# Patient Record
Sex: MALE | Race: WHITE | Employment: OTHER | URBAN - METROPOLITAN AREA
[De-identification: names, ages, dates, MRNs, and addresses within clinical notes are randomized per-mention and may not be internally consistent; named-entity substitution may affect disease eponyms.]

---

## 2017-01-12 ENCOUNTER — OFFICE VISIT (OUTPATIENT)
Dept: FAMILY MEDICINE CLINIC | Facility: CLINIC | Age: 62
End: 2017-01-12

## 2017-01-12 VITALS
TEMPERATURE: 99 F | SYSTOLIC BLOOD PRESSURE: 128 MMHG | HEART RATE: 66 BPM | WEIGHT: 192 LBS | BODY MASS INDEX: 29.78 KG/M2 | HEIGHT: 67.5 IN | DIASTOLIC BLOOD PRESSURE: 70 MMHG

## 2017-01-12 DIAGNOSIS — L21.9 SEBORRHEIC DERMATITIS: ICD-10-CM

## 2017-01-12 DIAGNOSIS — M79.671 RIGHT FOOT PAIN: Primary | ICD-10-CM

## 2017-01-12 PROCEDURE — 99214 OFFICE O/P EST MOD 30 MIN: CPT | Performed by: FAMILY MEDICINE

## 2017-01-12 RX ORDER — NAPROXEN 500 MG/1
500 TABLET ORAL 2 TIMES DAILY WITH MEALS
Qty: 14 TABLET | Refills: 1 | Status: SHIPPED | OUTPATIENT
Start: 2017-01-12 | End: 2017-01-19

## 2017-01-12 RX ORDER — KETOCONAZOLE 20 MG/G
1 CREAM TOPICAL DAILY
Qty: 30 G | Refills: 0 | Status: SHIPPED | OUTPATIENT
Start: 2017-01-12 | End: 2017-08-02 | Stop reason: ALTCHOICE

## 2017-01-12 NOTE — PROGRESS NOTES
Joe Ames is a 64year old male. Patient presents with: Ball Of Foot Pain: per pt      HPI:   Pain in the ball of his right foot. Started two days ago, no injury that he can think of. Shoes are 10 months old.  Has not had issues in the past. Took i 4 Cans of beer, 0 Standard drinks or equivalent per week       Comment: 4 drinks per week       BP Readings from Last 6 Encounters:  01/12/17 : 128/70  12/22/16 : 116/63  10/03/16 : 121/69  09/17/16 : 112/74  08/30/16 : 104/70  08/01/16 : 116/62      Wt R Possible neuroma, bone spur or other lesion. No trauma so fracture is less likely. - stay off of it if possible. Pt understands and agrees with plan. Seborrheic dermatitis  -     ketoconazole 2 % External Cream; Apply 1 Application topically daily.

## 2017-03-13 ENCOUNTER — PATIENT MESSAGE (OUTPATIENT)
Dept: FAMILY MEDICINE CLINIC | Facility: CLINIC | Age: 62
End: 2017-03-13

## 2017-03-13 DIAGNOSIS — H91.8X9 OTHER SPECIFIED FORMS OF HEARING LOSS, UNSPECIFIED LATERALITY: Primary | ICD-10-CM

## 2017-03-13 DIAGNOSIS — L29.9 EAR ITCHING: ICD-10-CM

## 2017-03-13 NOTE — TELEPHONE ENCOUNTER
From: Oscar Perez  To: Eduarda Stallworth DO  Sent: 3/13/2017 9:45 AM CDT  Subject: Other    My right ear, (the only ear I can hear from) has been suffering from skin issues and the medicine you prescribed does not seem to be helping.  The issue has got

## 2017-03-13 NOTE — TELEPHONE ENCOUNTER
Per Epic patient has a PPO plan and would not need a referral.  Do you want to send and order or any records to Dr Enzo Lofton office?

## 2017-05-15 NOTE — TELEPHONE ENCOUNTER
Medication: Sertraline    Date of last refill: 10/10/16 with 5 addt refills  [place]     Last office visit: 12/22/2016  Due back to clinic per last office note:  RTN in 6 months 06/22/17  Date next office visit scheduled

## 2017-07-12 ENCOUNTER — PRIOR ORIGINAL RECORDS (OUTPATIENT)
Dept: OTHER | Age: 62
End: 2017-07-12

## 2017-07-13 ENCOUNTER — LAB ENCOUNTER (OUTPATIENT)
Dept: LAB | Age: 62
End: 2017-07-13
Attending: INTERNAL MEDICINE
Payer: COMMERCIAL

## 2017-07-13 DIAGNOSIS — E78.00 PURE HYPERCHOLESTEROLEMIA: ICD-10-CM

## 2017-07-13 DIAGNOSIS — I10 ESSENTIAL HYPERTENSION, MALIGNANT: ICD-10-CM

## 2017-07-13 DIAGNOSIS — R07.9 CHEST PAIN, UNSPECIFIED: Primary | ICD-10-CM

## 2017-07-13 LAB
ALBUMIN SERPL-MCNC: 4.1 G/DL (ref 3.5–4.8)
ALP LIVER SERPL-CCNC: 84 U/L (ref 45–117)
ALT SERPL-CCNC: 50 U/L (ref 17–63)
AST SERPL-CCNC: 22 U/L (ref 15–41)
BILIRUB SERPL-MCNC: 1.1 MG/DL (ref 0.1–2)
BUN BLD-MCNC: 19 MG/DL (ref 8–20)
CALCIUM BLD-MCNC: 9.2 MG/DL (ref 8.3–10.3)
CHLORIDE: 107 MMOL/L (ref 101–111)
CHOLEST SMN-MCNC: 112 MG/DL (ref ?–200)
CO2: 27 MMOL/L (ref 22–32)
CREAT BLD-MCNC: 1.21 MG/DL (ref 0.7–1.3)
GLUCOSE BLD-MCNC: 100 MG/DL (ref 70–99)
M PROTEIN MFR SERPL ELPH: 7.2 G/DL (ref 6.1–8.3)
POTASSIUM SERPL-SCNC: 4.4 MMOL/L (ref 3.6–5.1)
SODIUM SERPL-SCNC: 141 MMOL/L (ref 136–144)

## 2017-07-13 PROCEDURE — 82465 ASSAY BLD/SERUM CHOLESTEROL: CPT

## 2017-07-13 PROCEDURE — 80053 COMPREHEN METABOLIC PANEL: CPT

## 2017-07-13 PROCEDURE — 36415 COLL VENOUS BLD VENIPUNCTURE: CPT

## 2017-07-19 ENCOUNTER — HOSPITAL ENCOUNTER (OUTPATIENT)
Dept: CV DIAGNOSTICS | Age: 62
Discharge: HOME | End: 2017-07-19
Attending: INTERNAL MEDICINE
Payer: COMMERCIAL

## 2017-07-19 DIAGNOSIS — I25.10 CORONARY ATHEROSCLEROSIS OF NATIVE CORONARY ARTERY: ICD-10-CM

## 2017-07-19 DIAGNOSIS — R07.9 CHEST PAIN, UNSPECIFIED: ICD-10-CM

## 2017-07-19 PROCEDURE — 93018 CV STRESS TEST I&R ONLY: CPT | Performed by: INTERNAL MEDICINE

## 2017-07-19 PROCEDURE — 78452 HT MUSCLE IMAGE SPECT MULT: CPT | Performed by: INTERNAL MEDICINE

## 2017-07-19 PROCEDURE — 93017 CV STRESS TEST TRACING ONLY: CPT | Performed by: INTERNAL MEDICINE

## 2017-07-20 ENCOUNTER — PRIOR ORIGINAL RECORDS (OUTPATIENT)
Dept: OTHER | Age: 62
End: 2017-07-20

## 2017-07-31 RX ORDER — ATORVASTATIN CALCIUM 20 MG/1
TABLET, FILM COATED ORAL
Qty: 90 TABLET | Refills: 0 | Status: SHIPPED | OUTPATIENT
Start: 2017-07-31 | End: 2017-10-30

## 2017-07-31 NOTE — TELEPHONE ENCOUNTER
Last ov 1/12/17  Last labs 7/13/17  Last refilled 12/23/16  #90  1 refill     Routed to Dr Bruno Julian as covering provider

## 2017-08-02 ENCOUNTER — OFFICE VISIT (OUTPATIENT)
Dept: FAMILY MEDICINE CLINIC | Facility: CLINIC | Age: 62
End: 2017-08-02

## 2017-08-02 VITALS
BODY MASS INDEX: 30 KG/M2 | WEIGHT: 195.13 LBS | DIASTOLIC BLOOD PRESSURE: 62 MMHG | TEMPERATURE: 98 F | SYSTOLIC BLOOD PRESSURE: 108 MMHG

## 2017-08-02 DIAGNOSIS — R21 RASH: Primary | ICD-10-CM

## 2017-08-02 PROCEDURE — 99214 OFFICE O/P EST MOD 30 MIN: CPT | Performed by: FAMILY MEDICINE

## 2017-08-02 RX ORDER — PREDNISONE 20 MG/1
TABLET ORAL
Qty: 18 TABLET | Refills: 0 | Status: SHIPPED | OUTPATIENT
Start: 2017-08-02 | End: 2017-08-11

## 2017-08-02 RX ORDER — VALACYCLOVIR HYDROCHLORIDE 1 G/1
1 TABLET, FILM COATED ORAL EVERY 12 HOURS SCHEDULED
Qty: 21 TABLET | Refills: 0 | Status: SHIPPED | OUTPATIENT
Start: 2017-08-02 | End: 2017-08-09

## 2017-08-02 NOTE — PROGRESS NOTES
Federico Brown is a 58year old male. CC:  Patient presents with:  Derm Problem: rash on side of head, jaw swelling, and heartburn. ... Sumanth Angles Sumanth Angles RM 4      HPI:  L side of head with a rash for about 4-5 days. No recall of exposure.  He notes a headache on the Denies congestion    Vitals: /62   Temp 98.3 °F (36.8 °C) (Temporal)   Wt 195 lb 2 oz   BMI 30.11 kg/m²    Reviewed by Blake Mendez M.D.     Physical Exam:  GEN: well developed, well nourished, in no apparent distress  EYE: B conjunctiva and lids mehdi

## 2017-08-09 ENCOUNTER — PRIOR ORIGINAL RECORDS (OUTPATIENT)
Dept: OTHER | Age: 62
End: 2017-08-09

## 2017-09-25 DIAGNOSIS — R41.3 MEMORY PROBLEM: Primary | ICD-10-CM

## 2017-09-25 RX ORDER — SERTRALINE HYDROCHLORIDE 25 MG/1
25 TABLET, FILM COATED ORAL DAILY
Qty: 30 TABLET | Refills: 2 | Status: SHIPPED | OUTPATIENT
Start: 2017-09-25 | End: 2017-09-26

## 2017-09-25 NOTE — TELEPHONE ENCOUNTER
Medication: Sertraline 50 mg    Date of last refill: 05/15/17  [place]     Last office visit: 12/22/16  Due back to clinic per last office note: RTN in 6 months  By 06/22/17  Date next office visit scheduled:  No future

## 2017-09-26 ENCOUNTER — OFFICE VISIT (OUTPATIENT)
Dept: NEUROLOGY | Facility: CLINIC | Age: 62
End: 2017-09-26

## 2017-09-26 VITALS
SYSTOLIC BLOOD PRESSURE: 117 MMHG | BODY MASS INDEX: 29.47 KG/M2 | RESPIRATION RATE: 16 BRPM | HEART RATE: 65 BPM | WEIGHT: 190 LBS | HEIGHT: 67.5 IN | DIASTOLIC BLOOD PRESSURE: 72 MMHG

## 2017-09-26 DIAGNOSIS — R41.3 MEMORY PROBLEM: Primary | ICD-10-CM

## 2017-09-26 PROCEDURE — 99213 OFFICE O/P EST LOW 20 MIN: CPT | Performed by: PHYSICIAN ASSISTANT

## 2017-09-26 RX ORDER — SERTRALINE HYDROCHLORIDE 25 MG/1
25 TABLET, FILM COATED ORAL DAILY
Qty: 90 TABLET | Refills: 3 | Status: SHIPPED | OUTPATIENT
Start: 2017-09-26 | End: 2018-10-26

## 2017-09-26 NOTE — PATIENT INSTRUCTIONS
Refill policies:    • Allow 2-3 business days for refills; controlled substances may take longer.   • Contact your pharmacy at least 5 days prior to running out of medication and have them send an electronic request or submit request through the [facility] have a procedure or additional testing performed. [facility]) will contact your insurance carrier to obtain pre-certification or prior authorization.     Unfortunately, [facility] has seen an increase in denial of payment even though the p

## 2017-09-28 NOTE — PROGRESS NOTES
HPI:    Patient ID: Lionel Pina is a 58year old male. HPI     Patient is a 58year old male here for follow-up of memory complaints.  At last visit 6 months ago his zoloft was increased to 75mg daily and he is tolerating it well without side effec displays no tremor and normal reflexes. No cranial nerve deficit. He displays a negative Romberg sign. Coordination and gait normal.   Reflex Scores:       Tricep reflexes are 2+ on the right side and 2+ on the left side.        Bicep reflexes are 2+ on the

## 2017-10-26 ENCOUNTER — TELEPHONE (OUTPATIENT)
Dept: NEUROLOGY | Facility: CLINIC | Age: 62
End: 2017-10-26

## 2017-10-30 RX ORDER — ATORVASTATIN CALCIUM 20 MG/1
TABLET, FILM COATED ORAL
Qty: 30 TABLET | Refills: 11 | Status: SHIPPED | OUTPATIENT
Start: 2017-10-30 | End: 2018-11-02

## 2017-11-10 ENCOUNTER — OFFICE VISIT (OUTPATIENT)
Dept: FAMILY MEDICINE CLINIC | Facility: CLINIC | Age: 62
End: 2017-11-10

## 2017-11-10 VITALS
RESPIRATION RATE: 16 BRPM | SYSTOLIC BLOOD PRESSURE: 118 MMHG | HEART RATE: 64 BPM | BODY MASS INDEX: 29.7 KG/M2 | TEMPERATURE: 98 F | WEIGHT: 196 LBS | HEIGHT: 68 IN | DIASTOLIC BLOOD PRESSURE: 78 MMHG

## 2017-11-10 DIAGNOSIS — R53.82 CHRONIC FATIGUE: ICD-10-CM

## 2017-11-10 DIAGNOSIS — R12 HEART BURN: ICD-10-CM

## 2017-11-10 DIAGNOSIS — R19.7 DIARRHEA, UNSPECIFIED TYPE: Primary | ICD-10-CM

## 2017-11-10 PROCEDURE — 82306 VITAMIN D 25 HYDROXY: CPT | Performed by: FAMILY MEDICINE

## 2017-11-10 PROCEDURE — 82728 ASSAY OF FERRITIN: CPT | Performed by: FAMILY MEDICINE

## 2017-11-10 PROCEDURE — 83690 ASSAY OF LIPASE: CPT | Performed by: FAMILY MEDICINE

## 2017-11-10 PROCEDURE — 80061 LIPID PANEL: CPT | Performed by: FAMILY MEDICINE

## 2017-11-10 PROCEDURE — 36415 COLL VENOUS BLD VENIPUNCTURE: CPT | Performed by: FAMILY MEDICINE

## 2017-11-10 PROCEDURE — 99214 OFFICE O/P EST MOD 30 MIN: CPT | Performed by: FAMILY MEDICINE

## 2017-11-10 PROCEDURE — 80050 GENERAL HEALTH PANEL: CPT | Performed by: FAMILY MEDICINE

## 2017-11-10 NOTE — PROGRESS NOTES
Javier Dayanna is a 58year old male. Patient presents with: Follow - Up: general checkup, having diarrhea, heartburn per pt      HPI:   Feeling tired, no drive. Lack of motivation, stays home more than he used to. Diarrhea for 1 month.  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 Diagnoses and all orders for this visit:    Diarrhea, unspecified type  -     CBC WITH DIFFERENTIAL WITH PLATELET; Future  -     COMP METABOLIC PANEL (14);  Future  -     LIPID PANEL;

## 2017-11-11 RX ORDER — INFLUENZA A VIRUS A/CHRISTCHURCH/16/2010 NIB-74 (H1N1) HEMAGGLUTININ ANTIGEN (PROPIOLACTONE INACTIVATED), INFLUENZA A VIRUS A/SWITZERLAND/9715293/2013, NIB-88 (H3N2) HEMAGGLUTININ ANTIGEN (PROPIOLACTONE INACTIVATED), INFLUENZA B VIRUS B/PHUKET/3073/2013 - WILD TYPE HEMAGGLUTININ ANTIGEN (PROPIOLACTONE INACTIVATED) 15; 15; 15 UG/.5ML; UG/.5ML; UG/.5ML
INJECTION, SUSPENSION INTRAMUSCULAR
Refills: 0 | COMMUNITY
Start: 2017-09-28 | End: 2018-10-30 | Stop reason: ALTCHOICE

## 2017-11-16 ENCOUNTER — HOSPITAL ENCOUNTER (OUTPATIENT)
Dept: ULTRASOUND IMAGING | Age: 62
Discharge: HOME | End: 2017-11-16
Attending: FAMILY MEDICINE
Payer: COMMERCIAL

## 2017-11-16 DIAGNOSIS — R53.82 CHRONIC FATIGUE: ICD-10-CM

## 2017-11-16 DIAGNOSIS — R12 HEART BURN: ICD-10-CM

## 2017-11-16 DIAGNOSIS — R19.7 DIARRHEA, UNSPECIFIED TYPE: ICD-10-CM

## 2017-11-16 PROCEDURE — 76700 US EXAM ABDOM COMPLETE: CPT | Performed by: FAMILY MEDICINE

## 2018-04-06 ENCOUNTER — NURSE ONLY (OUTPATIENT)
Dept: FAMILY MEDICINE CLINIC | Facility: CLINIC | Age: 63
End: 2018-04-06

## 2018-04-06 ENCOUNTER — PRIOR ORIGINAL RECORDS (OUTPATIENT)
Dept: OTHER | Age: 63
End: 2018-04-06

## 2018-04-06 DIAGNOSIS — E78.00 PURE HYPERCHOLESTEROLEMIA: Primary | ICD-10-CM

## 2018-04-06 PROCEDURE — 36415 COLL VENOUS BLD VENIPUNCTURE: CPT | Performed by: FAMILY MEDICINE

## 2018-04-06 PROCEDURE — 80061 LIPID PANEL: CPT | Performed by: INTERNAL MEDICINE

## 2018-04-06 PROCEDURE — 80053 COMPREHEN METABOLIC PANEL: CPT | Performed by: INTERNAL MEDICINE

## 2018-04-06 NOTE — PROGRESS NOTES
Isidra Youngblood presents today for nurse visit. Order from Dr Alfredito Velasco in blue book. 1 light green drawn from left hand area with straight needle. Left office in stable condition.

## 2018-04-11 ENCOUNTER — PRIOR ORIGINAL RECORDS (OUTPATIENT)
Dept: OTHER | Age: 63
End: 2018-04-11

## 2018-04-11 LAB
ALBUMIN: 3.7 G/DL
ALKALINE PHOSPHATATE(ALK PHOS): 80 IU/L
BILIRUBIN TOTAL: 0.7 MG/DL
BUN: 20 MG/DL
CALCIUM: 8.5 MG/DL
CHLORIDE: 109 MEQ/L
CHOLESTEROL, TOTAL: 97 MG/DL
CREATININE, SERUM: 1.07 MG/DL
GLUCOSE: 100 MG/DL
HDL CHOLESTEROL: 37 MG/DL
LDL CHOLESTEROL: 45 MG/DL
NON-HDL CHOLESTEROL: 60 MG/DL
POTASSIUM, SERUM: 3.9 MEQ/L
PROTEIN, TOTAL: 6.7 G/DL
SGOT (AST): 21 IU/L
SGPT (ALT): 46 IU/L
SODIUM: 142 MEQ/L
TOTAL CHOLESTEROL / HDL RATIO: 2.62 RATIO UN
TRIGLYCERIDES: 74 MG/DL

## 2018-05-09 ENCOUNTER — PRIOR ORIGINAL RECORDS (OUTPATIENT)
Dept: OTHER | Age: 63
End: 2018-05-09

## 2018-05-09 ENCOUNTER — MYAURORA ACCOUNT LINK (OUTPATIENT)
Dept: OTHER | Age: 63
End: 2018-05-09

## 2018-05-11 ENCOUNTER — MED REC SCAN ONLY (OUTPATIENT)
Dept: FAMILY MEDICINE CLINIC | Facility: CLINIC | Age: 63
End: 2018-05-11

## 2018-05-22 ENCOUNTER — TELEPHONE (OUTPATIENT)
Dept: FAMILY MEDICINE CLINIC | Facility: CLINIC | Age: 63
End: 2018-05-22

## 2018-05-23 ENCOUNTER — OFFICE VISIT (OUTPATIENT)
Dept: FAMILY MEDICINE CLINIC | Facility: CLINIC | Age: 63
End: 2018-05-23

## 2018-05-23 VITALS
SYSTOLIC BLOOD PRESSURE: 110 MMHG | HEART RATE: 60 BPM | HEIGHT: 67.5 IN | RESPIRATION RATE: 14 BRPM | BODY MASS INDEX: 30.04 KG/M2 | WEIGHT: 193.63 LBS | DIASTOLIC BLOOD PRESSURE: 80 MMHG | TEMPERATURE: 98 F

## 2018-05-23 DIAGNOSIS — L03.031 PARONYCHIA OF GREAT TOE, RIGHT: Primary | ICD-10-CM

## 2018-05-23 PROCEDURE — 99213 OFFICE O/P EST LOW 20 MIN: CPT | Performed by: FAMILY MEDICINE

## 2018-05-23 NOTE — PROGRESS NOTES
HPI:    Patient ID: Ulysses Malay is a 61year old male. Patient presents with: Toe Pain      HPI  Patient is here for Rt toe pain for 2 days. States about 3-4 days ago he got a splinter in his Rt great toe.  He attempted to remove it at home but fa COLONOSCOPY      Comment: normal, repeat in 3 yrs due to h/o polyps and                family h/o colon cancer  10/3/2016: COLONOSCOPY N/A      Comment: Procedure: COLONOSCOPY;  Surgeon:  Steffany Mendoza MD;  Location: [address] [place] [address] ENDOSCOPY  No more

## 2018-07-27 ENCOUNTER — OFFICE VISIT (OUTPATIENT)
Dept: FAMILY MEDICINE CLINIC | Facility: CLINIC | Age: 63
End: 2018-07-27
Payer: COMMERCIAL

## 2018-07-27 VITALS
HEART RATE: 62 BPM | WEIGHT: 186 LBS | OXYGEN SATURATION: 98 % | SYSTOLIC BLOOD PRESSURE: 126 MMHG | DIASTOLIC BLOOD PRESSURE: 74 MMHG | BODY MASS INDEX: 29 KG/M2

## 2018-07-27 DIAGNOSIS — R10.31 RIGHT INGUINAL PAIN: ICD-10-CM

## 2018-07-27 DIAGNOSIS — L03.011 PARONYCHIA OF FINGER OF RIGHT HAND: Primary | ICD-10-CM

## 2018-07-27 DIAGNOSIS — G56.01 CARPAL TUNNEL SYNDROME OF RIGHT WRIST: ICD-10-CM

## 2018-07-27 PROCEDURE — 99214 OFFICE O/P EST MOD 30 MIN: CPT | Performed by: FAMILY MEDICINE

## 2018-07-27 RX ORDER — CEPHALEXIN 500 MG/1
500 CAPSULE ORAL 2 TIMES DAILY
Qty: 14 CAPSULE | Refills: 0 | Status: SHIPPED | OUTPATIENT
Start: 2018-07-27 | End: 2018-08-03

## 2018-07-27 NOTE — PROGRESS NOTES
Isidra Youngblood is a 61year old male. Patient presents with:  Eval-G (gynecologic): sharp pain on right side x 1 day  Finger Injury: right index x 10 days  Numbness: right mid and ring finger x 2 weeks       HPI:   Retired in June.  Elisabethd the entire l ear      Social History:  Smoking status: Never Smoker                                                              Smokeless tobacco: Never Used                      Alcohol use: Yes           2.4 oz/week     Cans of beer: 4 per week     Comment: 4 drinks Cap; Take 1 capsule (500 mg total) by mouth 2 (two) times daily. - take with a daily probiotic or yogurt     Carpal tunnel syndrome of right wrist  - wear a brace, can take 2 aleve OTC BID x 1 week to help calm it down, take with food.      Right inguinal

## 2018-08-30 ENCOUNTER — OFFICE VISIT (OUTPATIENT)
Dept: FAMILY MEDICINE CLINIC | Facility: CLINIC | Age: 63
End: 2018-08-30
Payer: COMMERCIAL

## 2018-08-30 VITALS — OXYGEN SATURATION: 98 % | DIASTOLIC BLOOD PRESSURE: 80 MMHG | HEART RATE: 64 BPM | SYSTOLIC BLOOD PRESSURE: 124 MMHG

## 2018-08-30 DIAGNOSIS — H61.21 IMPACTED CERUMEN OF RIGHT EAR: Primary | ICD-10-CM

## 2018-08-30 DIAGNOSIS — H60.501 ACUTE OTITIS EXTERNA OF RIGHT EAR, UNSPECIFIED TYPE: ICD-10-CM

## 2018-08-30 PROCEDURE — 69209 REMOVE IMPACTED EAR WAX UNI: CPT | Performed by: PHYSICIAN ASSISTANT

## 2018-08-30 NOTE — PATIENT INSTRUCTIONS
1.  Cortisporin otic drops as prescribed for 7 days. 2.  Debrox ear drops (over the counter) as needed/directed. Earwax Removal    The ear canal makes earwax from the canal’s lining.  The ears make wax to lubricate and protect the ear canal. The e · Don’t use cotton swabs in your ears. Cotton swabs may push wax deeper into the ear canal or damage the eardrum.  Use cotton gauze or a wet washcloth  to gently remove wax on the outside of the ear and around the opening to the ear canal.  · Don't use any © 0186-6460 [address]. [address] [facility] [place], [facility] [place]. All rights reserved. This information is not intended as a substitute for professional medical care. Always follow your healthcare professional's instructions.

## 2018-08-30 NOTE — PROGRESS NOTES
CHIEF COMPLAINT:   Patient presents with:  Cerumen Impaction: R ear, x 1 day--onset after attempting to clear out ears with q tip. H/o L sided hearing loss.        HPI:   Lionel Pina is a 61year old male who presents to clinic today with complaint SKIN: no unusual skin lesions or rashes  HEENT: See HPI  LUNGS: No shortness of breath, or wheezing. CARDIOVASCULAR: No chest pain, palpitations  GI: No N/V/C/D.   NEURO: denies headaches or dizziness    EXAM:   /80 (BP Location: Left arm)   Pulse 64 Follow up with PCP if s/sx worsen, do not begin to improve in 3 days, or if fever of 100.4 or greater persists for 72 hours. Patient voiced understand and is in agreement with treatment plan. Patient Instructions   1.   Cortisporin otic drops as pre · Don’t use mineral oil or OTC eardrops if you might have an ear infection or a ruptured eardrum. Tell your healthcare provider right away if you have diabetes or an immune disorder. · Don’t use cotton swabs in your ears.  Cotton swabs may push wax deeper © 7841-2796 [address]. [address] [facility] [place], [address] [place]. All rights reserved. This information is not intended as a substitute for professional medical care. Always follow your healthcare professional's instructions.           Benny Angelucci

## 2018-10-16 ENCOUNTER — HOSPITAL ENCOUNTER (OUTPATIENT)
Age: 63
Discharge: HOME | End: 2018-10-16
Payer: COMMERCIAL

## 2018-10-16 VITALS
OXYGEN SATURATION: 98 % | SYSTOLIC BLOOD PRESSURE: 118 MMHG | WEIGHT: 182 LBS | BODY MASS INDEX: 28 KG/M2 | HEART RATE: 75 BPM | DIASTOLIC BLOOD PRESSURE: 82 MMHG | TEMPERATURE: 97 F | RESPIRATION RATE: 16 BRPM

## 2018-10-16 DIAGNOSIS — S61.012A LACERATION OF LEFT THUMB WITHOUT FOREIGN BODY WITHOUT DAMAGE TO NAIL, INITIAL ENCOUNTER: Primary | ICD-10-CM

## 2018-10-16 PROCEDURE — 12001 RPR S/N/AX/GEN/TRNK 2.5CM/<: CPT

## 2018-10-16 PROCEDURE — 99213 OFFICE O/P EST LOW 20 MIN: CPT

## 2018-10-16 PROCEDURE — 99212 OFFICE O/P EST SF 10 MIN: CPT

## 2018-10-16 NOTE — ED INITIAL ASSESSMENT (HPI)
Patient states he was carving wood with a knife and lacerated his left thumb at approximately 1650 today. States his tetanus booster was updated this past summer.

## 2018-10-16 NOTE — ED PROVIDER NOTES
Patient Seen in: [id number] [facility] [place]    History   Patient presents with:  Laceration    Stated Complaint: L. Thumb Laceration    HPI  Pleasant 17-year-old male who states he was carving wood with a knife and lacerated his left thumb this after Constitutional: He is oriented to person, place, and time. He appears well-developed and well-nourished. No distress. HENT:   Head: Normocephalic and atraumatic. Neck: Normal range of motion.    Cardiovascular: Normal rate, regular rhythm, normal heart The patient is in good condition throughout his visit today and remains so upon discharge.  I discuss the plan of care with the patient, who expresses understanding.  All questions and concerns are addressed to the patient's satisfaction prior to discharge

## 2018-10-26 DIAGNOSIS — R41.3 MEMORY PROBLEM: ICD-10-CM

## 2018-10-26 RX ORDER — SERTRALINE HYDROCHLORIDE 25 MG/1
25 TABLET, FILM COATED ORAL DAILY
Qty: 90 TABLET | Refills: 1 | Status: SHIPPED | OUTPATIENT
Start: 2018-10-26 | End: 2019-04-18

## 2018-10-26 NOTE — TELEPHONE ENCOUNTER
Medication: Sertraline 50 mg & 25 mg    Date of last refill: 09/26/17 with 3 addt refills  [place]     Last office visit: 09/26/17  Due back to clinic per last office note:  RTN in 1 year by 09/26/18  Date next office vi

## 2018-10-30 ENCOUNTER — OFFICE VISIT (OUTPATIENT)
Dept: NEUROLOGY | Facility: CLINIC | Age: 63
End: 2018-10-30
Payer: COMMERCIAL

## 2018-10-30 ENCOUNTER — APPOINTMENT (OUTPATIENT)
Dept: LAB | Age: 63
End: 2018-10-30
Attending: PHYSICIAN ASSISTANT
Payer: COMMERCIAL

## 2018-10-30 VITALS
SYSTOLIC BLOOD PRESSURE: 107 MMHG | WEIGHT: 189 LBS | DIASTOLIC BLOOD PRESSURE: 60 MMHG | BODY MASS INDEX: 29.66 KG/M2 | HEART RATE: 70 BPM | HEIGHT: 67 IN | RESPIRATION RATE: 16 BRPM

## 2018-10-30 DIAGNOSIS — R41.3 MEMORY PROBLEM: ICD-10-CM

## 2018-10-30 DIAGNOSIS — R20.2 PARESTHESIAS: ICD-10-CM

## 2018-10-30 DIAGNOSIS — R41.3 MEMORY PROBLEM: Primary | ICD-10-CM

## 2018-10-30 PROCEDURE — 82746 ASSAY OF FOLIC ACID SERUM: CPT | Performed by: PHYSICIAN ASSISTANT

## 2018-10-30 PROCEDURE — 36415 COLL VENOUS BLD VENIPUNCTURE: CPT | Performed by: PHYSICIAN ASSISTANT

## 2018-10-30 PROCEDURE — 82607 VITAMIN B-12: CPT | Performed by: PHYSICIAN ASSISTANT

## 2018-10-30 PROCEDURE — 99214 OFFICE O/P EST MOD 30 MIN: CPT | Performed by: PHYSICIAN ASSISTANT

## 2018-10-30 RX ORDER — METOPROLOL SUCCINATE 50 MG/1
50 TABLET, EXTENDED RELEASE ORAL DAILY
COMMUNITY
End: 2021-04-19

## 2018-10-30 NOTE — PATIENT INSTRUCTIONS
Refill policies:    • Allow 2-3 business days for refills; controlled substances may take longer.   • Contact your pharmacy at least 5 days prior to running out of medication and have them send an electronic request or submit request through the “request re entire amount billed. Precertification and Prior Authorizations: If your physician has recommended that you have a procedure or additional testing performed.   [facility]) will contact your insurance carrier to obtain pre-certi

## 2018-10-30 NOTE — PROGRESS NOTES
[facility] [address]  4/3/1955  Primary Care Provider:  Harleen Garcia DO    10/30/2018  Accompanied visit:  (X) No ( ) yes, by:      61year old male patient being seen f External Cream, Apply 1 Application topically 2 (two) times daily. , Disp: 45 g, Rfl: 3  •  ATORVASTATIN 20 MG Oral Tab, TAKE ONE TABLET BY MOUTH DAILY, Disp: 30 tablet, Rfl: 11  •  aspirin 81 MG Oral Tab, Take 81 mg by mouth daily. , Disp: , Rfl:   PRN: appointment  Patient understands that if needed, based on condition and or test results, follow up will be readjusted        [facility]  10/30/2018, Time completed 8:51 AM    Decision making:  (  ) labs reviewed/ord

## 2018-11-02 RX ORDER — ATORVASTATIN CALCIUM 20 MG/1
TABLET, FILM COATED ORAL
Qty: 30 TABLET | Refills: 11 | Status: SHIPPED | OUTPATIENT
Start: 2018-11-02 | End: 2019-11-14

## 2019-01-24 ENCOUNTER — IMAGING SERVICES (OUTPATIENT)
Dept: OTHER | Age: 64
End: 2019-01-24

## 2019-02-19 ENCOUNTER — OFFICE VISIT (OUTPATIENT)
Dept: FAMILY MEDICINE CLINIC | Facility: CLINIC | Age: 64
End: 2019-02-19
Payer: COMMERCIAL

## 2019-02-19 VITALS
HEART RATE: 80 BPM | HEIGHT: 68 IN | SYSTOLIC BLOOD PRESSURE: 118 MMHG | WEIGHT: 191 LBS | RESPIRATION RATE: 16 BRPM | TEMPERATURE: 97 F | DIASTOLIC BLOOD PRESSURE: 70 MMHG | BODY MASS INDEX: 28.95 KG/M2

## 2019-02-19 DIAGNOSIS — G56.03 BILATERAL CARPAL TUNNEL SYNDROME: ICD-10-CM

## 2019-02-19 DIAGNOSIS — Z12.5 SCREENING FOR MALIGNANT NEOPLASM OF PROSTATE: ICD-10-CM

## 2019-02-19 DIAGNOSIS — Z00.00 PREVENTATIVE HEALTH CARE: Primary | ICD-10-CM

## 2019-02-19 DIAGNOSIS — H53.9 VISION CHANGES: ICD-10-CM

## 2019-02-19 PROCEDURE — 99396 PREV VISIT EST AGE 40-64: CPT | Performed by: FAMILY MEDICINE

## 2019-02-19 NOTE — PROGRESS NOTES
Patricia Desai is a 61year old male who presents for a complete physical exam.   HPI:   Pt complains of the issues below. Pain in both thumb MCP joints. 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 Hands are feeling weaker.  No numbness o Component Value Date    ALT 46 04/06/2018    ALT 47 11/10/2017    ALT 50 07/13/2017     No results found for: PSA       Current Outpatient Medications:  ATORVASTATIN 20 MG Oral Tab TAKE ONE TABLET BY MOUTH DAILY Disp: 30 tablet Rfl: 11   Metoprolol Succi shortness of breath with exertion  CARDIOVASCULAR: denies chest pain on exertion  GI: denies abdominal pain,denies heartburn  : denies nocturia or changes in stream  MUSCULOSKELETAL: denies back pain, joint pain as above   NEURO: denies headaches, numbne encounter diagnosis) - up to date. Screening for malignant neoplasm of prostate - check PSA with fasting labs. Bilateral carpal tunnel syndrome - recommend braces, relative rest, if worsening will refer to ortho hand.    Vision changes - see an eye doct

## 2019-02-20 ENCOUNTER — NURSE ONLY (OUTPATIENT)
Dept: FAMILY MEDICINE CLINIC | Facility: CLINIC | Age: 64
End: 2019-02-20
Payer: COMMERCIAL

## 2019-02-20 DIAGNOSIS — Z12.5 SCREENING FOR MALIGNANT NEOPLASM OF PROSTATE: ICD-10-CM

## 2019-02-20 DIAGNOSIS — Z00.00 PREVENTATIVE HEALTH CARE: ICD-10-CM

## 2019-02-20 LAB
ALBUMIN SERPL-MCNC: 3.7 G/DL (ref 3.4–5)
ALBUMIN/GLOB SERPL: 1.2 {RATIO} (ref 1–2)
ALP LIVER SERPL-CCNC: 78 U/L (ref 45–117)
ALT SERPL-CCNC: 42 U/L (ref 16–61)
ANION GAP SERPL CALC-SCNC: 7 MMOL/L (ref 0–18)
AST SERPL-CCNC: 24 U/L (ref 15–37)
BASOPHILS # BLD AUTO: 0.07 X10(3) UL (ref 0–0.2)
BASOPHILS NFR BLD AUTO: 1.2 %
BILIRUB SERPL-MCNC: 0.6 MG/DL (ref 0.1–2)
BUN BLD-MCNC: 14 MG/DL (ref 7–18)
BUN/CREAT SERPL: 14.1 (ref 10–20)
CALCIUM BLD-MCNC: 8.7 MG/DL (ref 8.5–10.1)
CHLORIDE SERPL-SCNC: 110 MMOL/L (ref 98–107)
CHOLEST SMN-MCNC: 110 MG/DL (ref ?–200)
CO2 SERPL-SCNC: 25 MMOL/L (ref 21–32)
COMPLEXED PSA SERPL-MCNC: 0.5 NG/ML (ref ?–4)
CREAT BLD-MCNC: 0.99 MG/DL (ref 0.7–1.3)
DEPRECATED RDW RBC AUTO: 47 FL (ref 35.1–46.3)
EOSINOPHIL # BLD AUTO: 0.11 X10(3) UL (ref 0–0.7)
EOSINOPHIL NFR BLD AUTO: 1.8 %
ERYTHROCYTE [DISTWIDTH] IN BLOOD BY AUTOMATED COUNT: 13.7 % (ref 11–15)
GLOBULIN PLAS-MCNC: 3.1 G/DL (ref 2.8–4.4)
GLUCOSE BLD-MCNC: 101 MG/DL (ref 70–99)
HCT VFR BLD AUTO: 40.5 % (ref 39–53)
HDLC SERPL-MCNC: 41 MG/DL (ref 40–59)
HGB BLD-MCNC: 13.7 G/DL (ref 13–17.5)
IMM GRANULOCYTES # BLD AUTO: 0.02 X10(3) UL (ref 0–1)
IMM GRANULOCYTES NFR BLD: 0.3 %
LDLC SERPL CALC-MCNC: 54 MG/DL (ref ?–100)
LYMPHOCYTES # BLD AUTO: 1.68 X10(3) UL (ref 1–4)
LYMPHOCYTES NFR BLD AUTO: 28.1 %
M PROTEIN MFR SERPL ELPH: 6.8 G/DL (ref 6.4–8.2)
MCH RBC QN AUTO: 32.7 PG (ref 26–34)
MCHC RBC AUTO-ENTMCNC: 33.8 G/DL (ref 31–37)
MCV RBC AUTO: 96.7 FL (ref 80–100)
MONOCYTES # BLD AUTO: 0.49 X10(3) UL (ref 0.1–1)
MONOCYTES NFR BLD AUTO: 8.2 %
NEUTROPHILS # BLD AUTO: 3.6 X10 (3) UL (ref 1.5–7.7)
NEUTROPHILS # BLD AUTO: 3.6 X10(3) UL (ref 1.5–7.7)
NEUTROPHILS NFR BLD AUTO: 60.4 %
NONHDLC SERPL-MCNC: 69 MG/DL (ref ?–130)
OSMOLALITY SERPL CALC.SUM OF ELEC: 295 MOSM/KG (ref 275–295)
PLATELET # BLD AUTO: 313 10(3)UL (ref 150–450)
POTASSIUM SERPL-SCNC: 4.1 MMOL/L (ref 3.5–5.1)
RBC # BLD AUTO: 4.19 X10(6)UL (ref 4.3–5.7)
SODIUM SERPL-SCNC: 142 MMOL/L (ref 136–145)
TRIGL SERPL-MCNC: 75 MG/DL (ref 30–149)
VLDLC SERPL CALC-MCNC: 15 MG/DL (ref 0–30)
WBC # BLD AUTO: 6 X10(3) UL (ref 4–11)

## 2019-02-20 PROCEDURE — 85025 COMPLETE CBC W/AUTO DIFF WBC: CPT | Performed by: FAMILY MEDICINE

## 2019-02-20 PROCEDURE — 84153 ASSAY OF PSA TOTAL: CPT | Performed by: FAMILY MEDICINE

## 2019-02-20 PROCEDURE — 80053 COMPREHEN METABOLIC PANEL: CPT | Performed by: FAMILY MEDICINE

## 2019-02-20 PROCEDURE — 36415 COLL VENOUS BLD VENIPUNCTURE: CPT | Performed by: FAMILY MEDICINE

## 2019-02-20 PROCEDURE — 80061 LIPID PANEL: CPT | Performed by: FAMILY MEDICINE

## 2019-02-20 NOTE — PROGRESS NOTES
Adria Fabry presents today for nurse visit. Labs ordered by Dr Derek Vance. 1 light green, 1 lavender drawn from right hand area with butterfly. Left office in stable condition.

## 2019-02-28 VITALS
SYSTOLIC BLOOD PRESSURE: 118 MMHG | WEIGHT: 194 LBS | HEART RATE: 58 BPM | HEIGHT: 69 IN | DIASTOLIC BLOOD PRESSURE: 64 MMHG | BODY MASS INDEX: 28.73 KG/M2

## 2019-02-28 VITALS
HEIGHT: 64 IN | DIASTOLIC BLOOD PRESSURE: 74 MMHG | SYSTOLIC BLOOD PRESSURE: 118 MMHG | HEART RATE: 70 BPM | BODY MASS INDEX: 33.12 KG/M2 | WEIGHT: 194 LBS

## 2019-02-28 VITALS
WEIGHT: 192 LBS | HEIGHT: 69 IN | SYSTOLIC BLOOD PRESSURE: 128 MMHG | BODY MASS INDEX: 28.44 KG/M2 | HEART RATE: 60 BPM | DIASTOLIC BLOOD PRESSURE: 84 MMHG

## 2019-04-18 DIAGNOSIS — R41.3 MEMORY PROBLEM: ICD-10-CM

## 2019-04-18 RX ORDER — SERTRALINE HYDROCHLORIDE 25 MG/1
TABLET, FILM COATED ORAL
Qty: 90 TABLET | Refills: 1 | Status: SHIPPED | OUTPATIENT
Start: 2019-04-18 | End: 2019-10-31

## 2019-04-18 NOTE — TELEPHONE ENCOUNTER
Medication: Sertraline 25 mg & 50 mg    Date of last refill: 10/26/18 with 1 addt refill # 90  [place]     Last office visit: 10/30/18  Due back to clinic per last office note:  RTN in 1 year by 10/30/2019  Date next off Instructions         Return in about 1 year (around 10/30/2019).

## 2019-07-03 ENCOUNTER — HOSPITAL ENCOUNTER (OUTPATIENT)
Age: 64
Discharge: HOME | End: 2019-07-03
Attending: FAMILY MEDICINE
Payer: COMMERCIAL

## 2019-07-03 VITALS
DIASTOLIC BLOOD PRESSURE: 64 MMHG | RESPIRATION RATE: 14 BRPM | OXYGEN SATURATION: 95 % | HEART RATE: 63 BPM | BODY MASS INDEX: 27.72 KG/M2 | TEMPERATURE: 97 F | HEIGHT: 68.5 IN | SYSTOLIC BLOOD PRESSURE: 114 MMHG | WEIGHT: 185 LBS

## 2019-07-03 DIAGNOSIS — J40 BRONCHITIS: Primary | ICD-10-CM

## 2019-07-03 PROCEDURE — 99213 OFFICE O/P EST LOW 20 MIN: CPT

## 2019-07-03 PROCEDURE — 99214 OFFICE O/P EST MOD 30 MIN: CPT

## 2019-07-03 RX ORDER — CHLORAL HYDRATE 500 MG
1000 CAPSULE ORAL DAILY
COMMUNITY

## 2019-07-03 RX ORDER — ALBUTEROL SULFATE 90 UG/1
2 AEROSOL, METERED RESPIRATORY (INHALATION) EVERY 4 HOURS PRN
Qty: 1 INHALER | Refills: 6 | Status: SHIPPED | OUTPATIENT
Start: 2019-07-03 | End: 2019-07-13

## 2019-07-03 RX ORDER — MULTIVIT WITH MINERALS/LUTEIN
1000 TABLET ORAL DAILY
COMMUNITY

## 2019-07-03 RX ORDER — BENZONATATE 200 MG/1
200 CAPSULE ORAL 3 TIMES DAILY PRN
Qty: 15 CAPSULE | Refills: 0 | Status: SHIPPED | OUTPATIENT
Start: 2019-07-03 | End: 2019-07-22 | Stop reason: ALTCHOICE

## 2019-07-03 RX ORDER — CEFDINIR 300 MG/1
300 CAPSULE ORAL 2 TIMES DAILY
Qty: 14 CAPSULE | Refills: 0 | Status: SHIPPED | OUTPATIENT
Start: 2019-07-03 | End: 2019-07-10

## 2019-07-03 NOTE — ED PROVIDER NOTES
Patient presents with:  Cough/URI      HPI:   Isidra Youngblood is a 59year old male who presents with complaints of chest congestion and non- productive cough for  4  days.   Patient denies fever, chills, chest pain, shortness of breath with exertion, hem hypertension    • Hyperlipidemia       Past Surgical History:   Procedure Laterality Date   • COLONOSCOPY  10/3/16    normal, repeat in 3 yrs due to h/o polyps and family h/o colon cancer   • COLONOSCOPY N/A 10/3/2016    Performed by Raeann Sanchez MD crackles. No accessory muscle use. No respiratory distress. No tachypnea noted. No retractions noted.    Heart: S1, S2 normal, no murmur, click, rub or gallop, regular rate and rhythm  Abdomen: soft, non-tender; bowel sounds normal; no masses,  no organome benzonatate 200 MG Oral Cap       Keila Frye DO  [address] [facility] [id number]    In 1 week  For re-check    All results reviewed and discussed with patient.   See AVS for detailed discharge instructions for your condition to

## 2019-07-03 NOTE — ED INITIAL ASSESSMENT (HPI)
Patient has been coughing for the past 4 days. Patient last night would cough so hard he would see stars. His son had a cough and was visiting last week.  Patient's dad  of colon cancer and had a cough like this in the end and it has the patient freaked

## 2019-07-22 ENCOUNTER — OFFICE VISIT (OUTPATIENT)
Dept: FAMILY MEDICINE CLINIC | Facility: CLINIC | Age: 64
End: 2019-07-22
Payer: COMMERCIAL

## 2019-07-22 VITALS
DIASTOLIC BLOOD PRESSURE: 68 MMHG | BODY MASS INDEX: 29 KG/M2 | SYSTOLIC BLOOD PRESSURE: 126 MMHG | TEMPERATURE: 97 F | RESPIRATION RATE: 16 BRPM | HEART RATE: 64 BPM | WEIGHT: 193 LBS

## 2019-07-22 DIAGNOSIS — G89.29 CHRONIC RIGHT HIP PAIN: ICD-10-CM

## 2019-07-22 DIAGNOSIS — M25.551 CHRONIC RIGHT HIP PAIN: ICD-10-CM

## 2019-07-22 DIAGNOSIS — K43.9 VENTRAL HERNIA WITHOUT OBSTRUCTION OR GANGRENE: Primary | ICD-10-CM

## 2019-07-22 PROCEDURE — 99214 OFFICE O/P EST MOD 30 MIN: CPT | Performed by: FAMILY MEDICINE

## 2019-07-22 NOTE — PROGRESS NOTES
Patricia Desai is a 59year old male. Patient presents with:  Hernia: on abdomen, feels tender      HPI:   His abd sticks out with push ups and the other day with working with wood as well. That is new for him.  He was outside in the heat when it happen from Last 6 Encounters:  07/22/19 : 193 lb  07/03/19 : 185 lb  02/19/19 : 191 lb  10/30/18 : 189 lb  10/16/18 : 182 lb  07/27/18 : 186 lb      REVIEW OF SYSTEMS:   GENERAL HEALTH: feels well no complaints  SKIN: denies any unusual skin lesions or rashes  R

## 2019-07-23 ENCOUNTER — OFFICE VISIT (OUTPATIENT)
Dept: PHYSICAL THERAPY | Age: 64
End: 2019-07-23
Attending: FAMILY MEDICINE
Payer: COMMERCIAL

## 2019-07-23 DIAGNOSIS — M25.551 CHRONIC RIGHT HIP PAIN: ICD-10-CM

## 2019-07-23 DIAGNOSIS — G89.29 CHRONIC RIGHT HIP PAIN: ICD-10-CM

## 2019-07-23 PROCEDURE — 97140 MANUAL THERAPY 1/> REGIONS: CPT

## 2019-07-23 PROCEDURE — 97161 PT EVAL LOW COMPLEX 20 MIN: CPT

## 2019-07-23 NOTE — PROGRESS NOTES
INITIAL EVALUATION:   Referring Physician: Dr. 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 No specific precautions are required although patient cardiac history is noted; he also did explain that he has pending appointment regarding abdominal hernia.       PLAN OF CARE:    Goals:    8 visits  -Demonstrate ability

## 2019-07-25 ENCOUNTER — OFFICE VISIT (OUTPATIENT)
Dept: PHYSICAL THERAPY | Age: 64
End: 2019-07-25
Attending: FAMILY MEDICINE
Payer: COMMERCIAL

## 2019-07-25 DIAGNOSIS — M25.551 CHRONIC RIGHT HIP PAIN: ICD-10-CM

## 2019-07-25 DIAGNOSIS — G89.29 CHRONIC RIGHT HIP PAIN: ICD-10-CM

## 2019-07-25 PROCEDURE — 97140 MANUAL THERAPY 1/> REGIONS: CPT

## 2019-07-25 PROCEDURE — 97110 THERAPEUTIC EXERCISES: CPT

## 2019-07-26 ENCOUNTER — OFFICE VISIT (OUTPATIENT)
Dept: SURGERY | Facility: CLINIC | Age: 64
End: 2019-07-26

## 2019-07-26 VITALS — BODY MASS INDEX: 29.25 KG/M2 | WEIGHT: 193 LBS | HEIGHT: 68 IN | HEART RATE: 64 BPM | TEMPERATURE: 97 F

## 2019-07-26 DIAGNOSIS — K42.9 UMBILICAL HERNIA WITHOUT OBSTRUCTION AND WITHOUT GANGRENE: Primary | ICD-10-CM

## 2019-07-26 PROCEDURE — 99024 POSTOP FOLLOW-UP VISIT: CPT | Performed by: SURGERY

## 2019-07-26 NOTE — H&P
Isidra Youngblood is a 59year old male  Patient presents with:  Hernia: new patient referred by Dr. Boston Schmidt for abdominal hernia consult. c/o abdominal discomfort.        REFERRED BY    Patient presents with  Diastasis recti and umbilical hernia   Pt states Psychiatric Son         depression   • Other (Other) Paternal Grandmother         glaucoma      Social History    Tobacco Use      Smoking status: Never Smoker      Smokeless tobacco: Never Used    Alcohol use:  Yes      Alcohol/week: 4.0 standard drinks Date Value Ref Range Status   • Glucose 02/20/2019 101* 70 - 99 mg/dL Final   • Sodium 02/20/2019 142  136 - 145 mmol/L Final   • Potassium 02/20/2019 4.1  3.5 - 5.1 mmol/L Final   • Chloride 02/20/2019 110* 98 - 107 mmol/L Final   • CO2 02/20/2019 25.0  2 Desirable  <130 mg/dL   Borderline  130-159 mg/dL   High        160-189 mg/dL       Very high >=190 mg/dL       • Prostate Specific Antigen Screen 02/20/2019 0.50  <=4.00 ng/mL Final    Comment:   INTERPRETIVE INFORMATION: TOTAL PROSTATE SPECIFIC ANTIGEN Absolute 02/20/2019 3.60  1.50 - 7.70 x10(3) uL Final   • Lymphocyte Absolute 02/20/2019 1.68  1.00 - 4.00 x10(3) uL Final   • Monocyte Absolute 02/20/2019 0.49  0.10 - 1.00 x10(3) uL Final   • Eosinophil Absolute 02/20/2019 0.11  0.00 - 0.70 x10(3) uL Fin

## 2019-08-05 ENCOUNTER — OFFICE VISIT (OUTPATIENT)
Dept: PHYSICAL THERAPY | Age: 64
End: 2019-08-05
Attending: FAMILY MEDICINE
Payer: COMMERCIAL

## 2019-08-05 PROCEDURE — 97140 MANUAL THERAPY 1/> REGIONS: CPT

## 2019-08-05 PROCEDURE — 97110 THERAPEUTIC EXERCISES: CPT

## 2019-08-05 NOTE — PROGRESS NOTES
.Dx:     Chronic right hip pain        Insurance (Authorized # of Visits):           Authorizing Physician: Dr. Godoy Friday    Next MD visit: none scheduled  Fall Risk: standard           Precautions: n/a             Subjective: Reports right hip pain    Objecti leg; right buttock area; comes and goes; stabbing; deep; present for several years with gradual worsening.  -Aggravated by transitioning in/out chair, off the ground; playing golf; walking; going down stairs; standing for several hours; crossing right leg awais  Today’s Treatment and Response:  -Trial treatment consisted of long axis distraction mobilization to right hip within a supine position performed in oscillatory fashion for 10 minutes followed by passive stretch of hip rotators in supine positio to attending to standing tasks of several hours without pain complaint  -Able to walk distances throughout his typical day with minimal to no aggravation  -Demonstrate appropriate performance of home program   -FOTO FS of at least 70/100    Jim / Javy Singh

## 2019-08-07 NOTE — PROGRESS NOTES
.Dx:     Chronic right hip pain        Insurance (Authorized # of Visits):           Authorizing Physician: Dr. Cisco Hernandez    Next MD visit: none scheduled  Fall Risk: standard           Precautions: n/a             Subjective: Reports right hip pain; doing mauri hip abduction hip IR in supine R LE   HEP:   See above    Charges:   Manual Therapy x 2  Therapeutic excercise x 1  Total Timed Treatment: 40 min    Total Treatment Time: 40 min     INITIAL EVALUATION:   Referring Physician: Dr. Sue Dewitt  Diagnosis:  Chronic -Right hip flexion is 100 degrees to onset of early resistance with mild symptom provocation right buttock   -Right hip ER is 30 degrees to onset of early resistance with mild symptom provocation right buttock  -Right hip IR is 5 degrees with overpressur comorbidity, at least 3 elements related to body structure and function with in stable clinical presentation. Distal complaint likely of proximal origin, however, response to treatment and ongoing assessment will clarify.   No specific precautions are req care.    X___________________________________________________ Date____________________    Certification From: 2/31/2293  To:10/21/2019

## 2019-08-08 ENCOUNTER — OFFICE VISIT (OUTPATIENT)
Dept: PHYSICAL THERAPY | Age: 64
End: 2019-08-08
Attending: FAMILY MEDICINE
Payer: COMMERCIAL

## 2019-08-08 PROCEDURE — 97110 THERAPEUTIC EXERCISES: CPT

## 2019-08-08 PROCEDURE — 97140 MANUAL THERAPY 1/> REGIONS: CPT

## 2019-08-08 NOTE — PROGRESS NOTES
.Dx:     Chronic right hip pain        Insurance (Authorized # of Visits):           Authorizing Physician: Dr. Luciana Ramirze    Next MD visit: none scheduled  Fall Risk: standard           Precautions: n/a             Subjective:  Reports he rode his back to the as well as included thoracolumbar ROM in quadruped Reviewed supine posterior capsular hip stretch (knee to chest) and provided instruction to alternatives such as quadruped as well as included thoracolumbar ROM in quadruped   -Manual resisted hip abduction describes prior level of function:  -No limitation specific limiation  Pt goals include:  -Pain reduction    OBJECTIVE:   Observation/gait/posture:   -Gait is with mild decrease in step length; subtle lateral trunk lean to right @ stance phase; mild decrea with CHERRIE re-assessment was noted. ASSESSMENT:   -59 year old female with pain complaint located in right gluteal with associated findings of impairment range of motion, muscle length, muscle performance of the hip region.   He would benefit from addres findings, precautions, and treatment options and has agreed to actively participate in planning and for this course of care. Thank you for your referral. Please co-sign or sign and return this letter via fax as soon as possible to [phone].  If you h

## 2019-08-13 ENCOUNTER — APPOINTMENT (OUTPATIENT)
Dept: PHYSICAL THERAPY | Age: 64
End: 2019-08-13
Payer: COMMERCIAL

## 2019-08-15 ENCOUNTER — OFFICE VISIT (OUTPATIENT)
Dept: PHYSICAL THERAPY | Age: 64
End: 2019-08-15
Attending: FAMILY MEDICINE
Payer: COMMERCIAL

## 2019-08-15 PROCEDURE — 97110 THERAPEUTIC EXERCISES: CPT

## 2019-08-15 PROCEDURE — 97140 MANUAL THERAPY 1/> REGIONS: CPT

## 2019-08-15 NOTE — PROGRESS NOTES
.Dx:     Chronic right hip pain        Insurance (Authorized # of Visits): Authorizing Physician: Dr. Christopher Zazueta ref.  provider found    Next MD visit: none scheduled  Fall Risk: standard           Precautions: n/a             Subjective:  Patient reports mobilization (25 min) -Joint mobilization right hip consisting of inferior glide with mobilization belt, lateral glide with mobilization belt, prone hip mobilization (25 min) -Joint mobilization right hip consisting of inferior glide with mobilization belt arthrosclerosis; cardiac stent, left sided hearing loss, hypertension, hyperlipideia    Primary Complaint(s)/Concern(s):  -Pain in right leg; right buttock area; comes and goes; stabbing; deep; present for several years with gradual worsening.  -Aggravated strength:   -Gluteus taina: 4/5  -Gluteus medius: 3+/5 with comparable symptoms  -Quadriceps: 4/5  Muscle length:   -Rectus femoris shortness  Today’s Treatment and Response:  -Trial treatment consisted of long axis distraction mobilization to right hip enhance ability to attending dressing tasks  -Demonstrate right hip IR to 20 degrees to allow for terminal stance phase of gait.  -Able to attending to standing tasks of several hours without pain complaint  -Able to walk distances throughout his typical d

## 2019-08-19 ENCOUNTER — OFFICE VISIT (OUTPATIENT)
Dept: PHYSICAL THERAPY | Age: 64
End: 2019-08-19
Attending: FAMILY MEDICINE
Payer: COMMERCIAL

## 2019-08-19 PROCEDURE — 97140 MANUAL THERAPY 1/> REGIONS: CPT

## 2019-08-19 PROCEDURE — 97110 THERAPEUTIC EXERCISES: CPT

## 2019-08-26 ENCOUNTER — APPOINTMENT (OUTPATIENT)
Dept: PHYSICAL THERAPY | Age: 64
End: 2019-08-26
Payer: COMMERCIAL

## 2019-10-31 ENCOUNTER — OFFICE VISIT (OUTPATIENT)
Dept: FAMILY MEDICINE CLINIC | Facility: CLINIC | Age: 64
End: 2019-10-31
Payer: COMMERCIAL

## 2019-10-31 VITALS
TEMPERATURE: 97 F | BODY MASS INDEX: 29 KG/M2 | OXYGEN SATURATION: 94 % | HEART RATE: 68 BPM | DIASTOLIC BLOOD PRESSURE: 64 MMHG | SYSTOLIC BLOOD PRESSURE: 92 MMHG | RESPIRATION RATE: 18 BRPM | WEIGHT: 193.25 LBS

## 2019-10-31 DIAGNOSIS — R41.3 MEMORY PROBLEM: ICD-10-CM

## 2019-10-31 DIAGNOSIS — Z86.010 HISTORY OF COLON POLYPS: ICD-10-CM

## 2019-10-31 DIAGNOSIS — Z12.11 SCREENING FOR COLON CANCER: ICD-10-CM

## 2019-10-31 DIAGNOSIS — R20.0 BILATERAL HAND NUMBNESS: Primary | ICD-10-CM

## 2019-10-31 PROCEDURE — 99214 OFFICE O/P EST MOD 30 MIN: CPT | Performed by: FAMILY MEDICINE

## 2019-10-31 RX ORDER — SERTRALINE HYDROCHLORIDE 25 MG/1
25 TABLET, FILM COATED ORAL
Qty: 90 TABLET | Refills: 1 | Status: SHIPPED | OUTPATIENT
Start: 2019-10-31 | End: 2020-05-26

## 2019-10-31 NOTE — PROGRESS NOTES
Emi Allred is a 59year old male. Patient presents with: Follow - Up: pt c/o pain bilateral arms and thighs, also requesting colonoscopy due to family hx      HPI:   Follow up. Due for colonoscopy, 3 yrs ago was last one.      Hands go numb at [facility] 107/60  10/16/18 : 118/82      Wt Readings from Last 6 Encounters:  10/31/19 : 193 lb 4 oz (87.7 kg)  07/26/19 : 193 lb (87.5 kg)  07/22/19 : 193 lb (87.5 kg)  07/03/19 : 185 lb (83.9 kg)  02/19/19 : 191 lb (86.6 kg)  10/30/18 : 189 lb (85.7 kg)      Trenda Librado Signed Prescriptions Disp Refills   • Sertraline HCl 50 MG Oral Tab 90 tablet 1     Sig: Take 1 tablet (50 mg total) by mouth once daily. • Sertraline HCl 25 MG Oral Tab 90 tablet 1     Sig: Take 1 tablet (25 mg total) by mouth once daily.

## 2019-11-14 RX ORDER — ATORVASTATIN CALCIUM 20 MG/1
TABLET, FILM COATED ORAL
Qty: 90 TABLET | Refills: 3 | Status: SHIPPED | OUTPATIENT
Start: 2019-11-14 | End: 2021-04-19

## 2019-11-14 NOTE — TELEPHONE ENCOUNTER
Last refill on Atorvastatin #30 with 11 refills on 11 2 2018   Last OV on  10 31 2019  Last Lipid Panel on 2 20 2019

## 2019-11-19 ENCOUNTER — TELEPHONE (OUTPATIENT)
Dept: CARDIOLOGY | Age: 64
End: 2019-11-19

## 2019-11-22 ENCOUNTER — TELEPHONE (OUTPATIENT)
Dept: FAMILY MEDICINE CLINIC | Facility: CLINIC | Age: 64
End: 2019-11-22

## 2019-11-22 DIAGNOSIS — M79.605 PAIN IN BOTH LOWER EXTREMITIES: ICD-10-CM

## 2019-11-22 DIAGNOSIS — M79.604 PAIN IN BOTH LOWER EXTREMITIES: ICD-10-CM

## 2019-11-22 DIAGNOSIS — S76.309A HAMSTRING INJURY, UNSPECIFIED LATERALITY, INITIAL ENCOUNTER: ICD-10-CM

## 2019-11-22 DIAGNOSIS — M54.50 LOW BACK PAIN WITHOUT SCIATICA, UNSPECIFIED BACK PAIN LATERALITY, UNSPECIFIED CHRONICITY: Primary | ICD-10-CM

## 2019-11-22 NOTE — TELEPHONE ENCOUNTER
Patient having pain in upper hamstring area of both legs. States it causes weakness in his legs. States it feels like someone punched him in the leg. Has been having lower back pain as well. notes he has been lifting heavy objects recently.     Would like

## 2019-11-22 NOTE — TELEPHONE ENCOUNTER
That's okay, I put in PT order. If not getting better, then see me in the office. I just saw him a few weeks ago.

## 2019-11-22 NOTE — TELEPHONE ENCOUNTER
Pt called, having leg problems-both legs. Would like a referral for physical therapy or if Dr. Tom Morris needs to see pt first please call pt at [phone].

## 2019-11-25 ENCOUNTER — OFFICE VISIT (OUTPATIENT)
Dept: PHYSICAL THERAPY | Age: 64
End: 2019-11-25
Attending: FAMILY MEDICINE
Payer: COMMERCIAL

## 2019-11-25 DIAGNOSIS — M79.604 PAIN IN BOTH LOWER EXTREMITIES: ICD-10-CM

## 2019-11-25 DIAGNOSIS — M54.50 LOW BACK PAIN WITHOUT SCIATICA, UNSPECIFIED BACK PAIN LATERALITY, UNSPECIFIED CHRONICITY: ICD-10-CM

## 2019-11-25 DIAGNOSIS — S76.309A HAMSTRING INJURY, UNSPECIFIED LATERALITY, INITIAL ENCOUNTER: ICD-10-CM

## 2019-11-25 DIAGNOSIS — M79.605 PAIN IN BOTH LOWER EXTREMITIES: ICD-10-CM

## 2019-11-25 PROCEDURE — 97140 MANUAL THERAPY 1/> REGIONS: CPT

## 2019-11-25 PROCEDURE — 97110 THERAPEUTIC EXERCISES: CPT

## 2019-11-25 PROCEDURE — 97161 PT EVAL LOW COMPLEX 20 MIN: CPT

## 2019-11-25 NOTE — PROGRESS NOTES
INITIAL EVALUATION:   Referring Physician: Dr. Sherry Cartwright  Diagnosis:   -Low back pain without sciatica  -Hamstring injury  -Pain in both lower extremities      Date of Service: 11/25/2019     PATIENT SUMMARY    Itz Alvarez is a 59year old male    Pa strong/symmetrical  AROM/overpressure:   -Trunk extension is with increase pain @ L/S region B LE @ ~ 10 degrees  -Trunk flexion is with stretch sensation  -Lateral flexion is with comparable complaint bilerally  Passive/segmental/accessory movement testin planning and for this course of care. Thank you for your referral. Please co-sign or sign and return this letter via fax as soon as possible to [phone].  If you have any questions, please contact me at Dept: [phone]    Sincerely,  Electronicall

## 2019-11-27 ENCOUNTER — OFFICE VISIT (OUTPATIENT)
Dept: PHYSICAL THERAPY | Age: 64
End: 2019-11-27
Attending: FAMILY MEDICINE
Payer: COMMERCIAL

## 2019-11-27 PROCEDURE — 97110 THERAPEUTIC EXERCISES: CPT

## 2019-11-27 PROCEDURE — 97140 MANUAL THERAPY 1/> REGIONS: CPT

## 2019-11-27 NOTE — PROGRESS NOTES
.Dx:       -Low back pain without sciatica  -Hamstring injury  -Pain in both lower extremities       Insurance (Authorized # of Visits):   No prior authorization is required per appointment notes    Authorizing Physician: Dr. Giuseppe Hebert    Next MD visit: none s Surgical History:   Procedure Laterality Date   • COLONOSCOPY  10/3/16    normal, repeat in 3 yrs due to h/o polyps and family h/o colon cancer   • COLONOSCOPY N/A 10/3/2016    Performed by Winnie Castellanos MD at [facility] END[facility]   • OTHER      Heart stent Instructed AROM of exercise consisting of quadruped flexion, quadruped thoracolumbar flexion/extension, supine LTR.   Patient was with increased trunk flexion without complaint post treatment; SLR: 70 degrees bilaterally    ASSESSMENT:   -59 year male; rela care.    X___________________________________________________ Date____________________    Certification From: 98/96/7104  To:2/23/2020

## 2019-11-29 RX ORDER — METOPROLOL SUCCINATE 25 MG/1
TABLET, EXTENDED RELEASE ORAL
Qty: 30 TABLET | Refills: 1 | OUTPATIENT
Start: 2019-11-29

## 2019-12-05 ENCOUNTER — OFFICE VISIT (OUTPATIENT)
Dept: PHYSICAL THERAPY | Age: 64
End: 2019-12-05
Attending: FAMILY MEDICINE
Payer: COMMERCIAL

## 2019-12-05 PROCEDURE — 97140 MANUAL THERAPY 1/> REGIONS: CPT

## 2019-12-05 PROCEDURE — 97110 THERAPEUTIC EXERCISES: CPT

## 2019-12-05 NOTE — PROGRESS NOTES
.Dx:       -Low back pain without sciatica  -Hamstring injury  -Pain in both lower extremities       Insurance (Authorized # of Visits): No prior authorization is required per appointment notes    Authorizing Physician: Dr. Valladares ref.  provider found    Next ankle DF/PF   -Supine bridge - 5-10 second hold - reps per tolerance -HEP -Supine HS curls over green theraball 2 sets x 15  -Supine bridge - 5-10 second hold x 10- HEP  -Shuttle DL 5B 2 sets x 20   HEP: See above  Charges:  Manual Therapy x 1 (15 min)  Th

## 2019-12-09 ENCOUNTER — OFFICE VISIT (OUTPATIENT)
Dept: PHYSICAL THERAPY | Age: 64
End: 2019-12-09
Attending: FAMILY MEDICINE
Payer: COMMERCIAL

## 2019-12-09 PROCEDURE — 97110 THERAPEUTIC EXERCISES: CPT

## 2019-12-09 PROCEDURE — 97140 MANUAL THERAPY 1/> REGIONS: CPT

## 2019-12-09 NOTE — PROGRESS NOTES
.Dx:       -Low back pain without sciatica  -Hamstring injury  -Pain in both lower extremities       Insurance (Authorized # of Visits):   No prior authorization is required per appointment notes    Authorizing Physician: Dr. Marlen Badillo    Next MD visit: none s activities without complaint  -Able to ascend/descend stairs without symptom provocation to 100% of prior level  -Attending to tasks about typical day without complaint or limitation    Plan: Re-assess functional asterisks (leg crossing, stairs forward olivia

## 2019-12-11 ENCOUNTER — OFFICE VISIT (OUTPATIENT)
Dept: PHYSICAL THERAPY | Age: 64
End: 2019-12-11
Attending: FAMILY MEDICINE
Payer: COMMERCIAL

## 2019-12-11 PROCEDURE — 97140 MANUAL THERAPY 1/> REGIONS: CPT

## 2019-12-11 PROCEDURE — 97110 THERAPEUTIC EXERCISES: CPT

## 2019-12-11 NOTE — PROGRESS NOTES
.Dx:       -Low back pain without sciatica  -Hamstring injury  -Pain in both lower extremities       Insurance (Authorized # of Visits):   No prior authorization is required per appointment notes    Authorizing Physician: Dr. Banerjee    Next MD visit: none s posterior thigh complaint; we discussed low load movement and starting the process of strengthening and provided gradual loading to hamstring area as to treatment for soft tissue strain.   While he can continue with yoga, we recommend some moderation with v of hip flexion did knee extension/flexion with ankle DF/PF  -Reviewed AROM exercise thoracolumbar and lumbar/sacral region in supine, quadruped, sitting.   Discussed rationale and use of such activities for range of motion stimulus as well as for self manag

## 2019-12-16 ENCOUNTER — OFFICE VISIT (OUTPATIENT)
Dept: PHYSICAL THERAPY | Age: 64
End: 2019-12-16
Attending: FAMILY MEDICINE
Payer: COMMERCIAL

## 2019-12-16 PROCEDURE — 97110 THERAPEUTIC EXERCISES: CPT

## 2019-12-16 NOTE — PROGRESS NOTES
.Dx:       -Low back pain without sciatica  -Hamstring injury  -Pain in both lower extremities       Insurance (Authorized # of Visits):   No prior authorization is required per appointment notes    Authorizing Physician: Dr. Raquel Gruber    Next MD visit: none s thoracolumbar distraction grade III  - Supine right hip distraction grade III -Left unilateral posterior/anterior pressure L3-L5 in prone grades III - IV  -Left gapping mobilization in right side lying  -Long axis distraction bilateral hip -Therapeutic Exe ______________________   HEP: See above  Charges:   Therapeutic excercise x 3 (40 min)   Total Timed Treatment: 40 min    Total Treatment Time: 40  min

## 2019-12-18 ENCOUNTER — OFFICE VISIT (OUTPATIENT)
Dept: PHYSICAL THERAPY | Age: 64
End: 2019-12-18
Attending: FAMILY MEDICINE
Payer: COMMERCIAL

## 2019-12-18 PROCEDURE — 97110 THERAPEUTIC EXERCISES: CPT

## 2019-12-18 NOTE — PROGRESS NOTES
Discharge Summary    Pt has attended 7 visits in Physical Therapy. Joie Awad Dx:       -Low back pain without sciatica  -Hamstring injury  -Pain in both lower extremities       Insurance (Authorized # of Visits):   No prior authorization is required per appointm agreement with this plan  Date: 11/27/2019  TX#: 2 Date: 12/5/2019  Tx#: 3 Date: 12/9/2019  Tx#:  4 Date: 12/11/2019  Tx#:  5   Date: 12/16/2019  Tx#:  6   Date: 12/18/2019  Tx#:  7     -S/L gapping mobilization lumbar spine -S/L gapping mobilization lumba ______________________ -Prone hamstring curl: 2# 30 reps x 2 sets each LE   -Supine neural mobilization (90/90) in 90 degrees of hip flexion did knee extension/flexion with ankle DF/PF -HEP -Supine neural mobilization (90/90) in 90 degrees of hip flexion d

## 2020-01-01 ENCOUNTER — EXTERNAL RECORD (OUTPATIENT)
Dept: OTHER | Age: 65
End: 2020-01-01

## 2020-01-09 RX ORDER — METOPROLOL SUCCINATE 25 MG/1
TABLET, EXTENDED RELEASE ORAL
Qty: 90 TABLET | Refills: 0 | Status: SHIPPED | OUTPATIENT
Start: 2020-01-09 | End: 2020-04-16

## 2020-02-14 ENCOUNTER — TELEPHONE (OUTPATIENT)
Dept: FAMILY MEDICINE CLINIC | Facility: CLINIC | Age: 65
End: 2020-02-14

## 2020-02-14 DIAGNOSIS — Z23 NEED FOR VACCINATION AGAINST MEASLES: Primary | ICD-10-CM

## 2020-02-14 DIAGNOSIS — Z23 NEED FOR VACCINATION AGAINST HEPATITIS A: ICD-10-CM

## 2020-02-14 DIAGNOSIS — Z23 NEED FOR VACCINATION AGAINST HEPATITIS B VIRUS: ICD-10-CM

## 2020-02-14 DIAGNOSIS — Z23 NEED FOR VACCINATION: ICD-10-CM

## 2020-02-14 NOTE — TELEPHONE ENCOUNTER
PT STOPPED IN AND ADV THAT HE WILL BE TRAVELING TO [facility] IN [facility].     VACCINE REQUIREMENTS ARE    HEP A   HEP B  MMR    (PT HAD [place] [facility] [place])    PLEASE PLACE ORDER, PT HAS AN APT ON 2/18/20 ALREADY      THANK YOU

## 2020-02-18 ENCOUNTER — NURSE ONLY (OUTPATIENT)
Dept: FAMILY MEDICINE CLINIC | Facility: CLINIC | Age: 65
End: 2020-02-18
Payer: COMMERCIAL

## 2020-02-18 DIAGNOSIS — Z23 NEED FOR VACCINATION AGAINST HEPATITIS B VIRUS: Primary | ICD-10-CM

## 2020-02-18 PROCEDURE — 90715 TDAP VACCINE 7 YRS/> IM: CPT | Performed by: FAMILY MEDICINE

## 2020-02-18 PROCEDURE — 90632 HEPA VACCINE ADULT IM: CPT | Performed by: FAMILY MEDICINE

## 2020-02-18 PROCEDURE — 90472 IMMUNIZATION ADMIN EACH ADD: CPT | Performed by: FAMILY MEDICINE

## 2020-02-18 PROCEDURE — 90707 MMR VACCINE SC: CPT | Performed by: FAMILY MEDICINE

## 2020-02-18 PROCEDURE — 90471 IMMUNIZATION ADMIN: CPT | Performed by: FAMILY MEDICINE

## 2020-02-18 PROCEDURE — 90746 HEPB VACCINE 3 DOSE ADULT IM: CPT | Performed by: FAMILY MEDICINE

## 2020-02-18 NOTE — PROGRESS NOTES
Patient to clinic for immunizations. VIS provided to patient. Patient states he is scheduled to donate blood Friday and wants to know if ok based on immunizations given today. Per KE, wait at least 2 weeks.   Patient notified and verbalized understanding

## 2020-03-26 RX ORDER — METOPROLOL SUCCINATE 25 MG/1
TABLET, EXTENDED RELEASE ORAL
Qty: 90 TABLET | Refills: 0 | OUTPATIENT
Start: 2020-03-26

## 2020-04-16 ENCOUNTER — TELEPHONE (OUTPATIENT)
Dept: CARDIOLOGY | Age: 65
End: 2020-04-16

## 2020-04-16 RX ORDER — METOPROLOL SUCCINATE 25 MG/1
TABLET, EXTENDED RELEASE ORAL
Qty: 90 TABLET | Refills: 0 | Status: SHIPPED | OUTPATIENT
Start: 2020-04-16 | End: 2020-04-22 | Stop reason: SDUPTHER

## 2020-04-17 ENCOUNTER — TELEPHONE (OUTPATIENT)
Dept: CARDIOLOGY | Age: 65
End: 2020-04-17

## 2020-04-22 ENCOUNTER — OFFICE VISIT (OUTPATIENT)
Dept: CARDIOLOGY | Age: 65
End: 2020-04-22

## 2020-04-22 VITALS — BODY MASS INDEX: 28.88 KG/M2 | WEIGHT: 195 LBS | HEIGHT: 69 IN

## 2020-04-22 DIAGNOSIS — E78.00 PURE HYPERCHOLESTEROLEMIA: ICD-10-CM

## 2020-04-22 DIAGNOSIS — R94.39 ABNORMAL CARDIOVASCULAR STRESS TEST: Primary | ICD-10-CM

## 2020-04-22 DIAGNOSIS — I10 ESSENTIAL (PRIMARY) HYPERTENSION: ICD-10-CM

## 2020-04-22 DIAGNOSIS — I25.10 ATHEROSCLEROSIS OF NATIVE CORONARY ARTERY OF NATIVE HEART WITHOUT ANGINA PECTORIS: ICD-10-CM

## 2020-04-22 DIAGNOSIS — R07.2 CHEST PAIN, PRECORDIAL: ICD-10-CM

## 2020-04-22 DIAGNOSIS — Z95.5 PRESENCE OF CORONARY ANGIOPLASTY IMPLANT AND GRAFT: ICD-10-CM

## 2020-04-22 PROCEDURE — 99214 OFFICE O/P EST MOD 30 MIN: CPT | Performed by: INTERNAL MEDICINE

## 2020-04-22 RX ORDER — SERTRALINE HYDROCHLORIDE 25 MG/1
TABLET, FILM COATED ORAL
COMMUNITY
Start: 2020-03-26 | End: 2023-05-18 | Stop reason: ALTCHOICE

## 2020-04-22 RX ORDER — ATORVASTATIN CALCIUM 20 MG/1
20 TABLET, FILM COATED ORAL DAILY
Qty: 90 TABLET | Refills: 3 | Status: SHIPPED | OUTPATIENT
Start: 2020-04-22 | End: 2022-11-23 | Stop reason: SDUPTHER

## 2020-04-22 RX ORDER — METOPROLOL SUCCINATE 25 MG/1
25 TABLET, EXTENDED RELEASE ORAL AT BEDTIME
Qty: 90 TABLET | Refills: 3 | Status: SHIPPED | OUTPATIENT
Start: 2020-04-22

## 2020-04-22 RX ORDER — ATORVASTATIN CALCIUM 20 MG/1
TABLET, FILM COATED ORAL
COMMUNITY
Start: 2015-05-29 | End: 2020-04-22 | Stop reason: SDUPTHER

## 2020-04-22 SDOH — HEALTH STABILITY: PHYSICAL HEALTH: ON AVERAGE, HOW MANY MINUTES DO YOU ENGAGE IN EXERCISE AT THIS LEVEL?: 0 MIN

## 2020-04-22 SDOH — HEALTH STABILITY: PHYSICAL HEALTH: ON AVERAGE, HOW MANY DAYS PER WEEK DO YOU ENGAGE IN MODERATE TO STRENUOUS EXERCISE (LIKE A BRISK WALK)?: 0 DAYS

## 2020-04-22 ASSESSMENT — ENCOUNTER SYMPTOMS
WEIGHT LOSS: 0
FEVER: 0
CHILLS: 0
WEIGHT GAIN: 0
BRUISES/BLEEDS EASILY: 0
SUSPICIOUS LESIONS: 0
ALLERGIC/IMMUNOLOGIC COMMENTS: NO NEW FOOD ALLERGIES
COUGH: 0
HEMATOCHEZIA: 0
HEMOPTYSIS: 0

## 2020-04-22 ASSESSMENT — PATIENT HEALTH QUESTIONNAIRE - PHQ9
SUM OF ALL RESPONSES TO PHQ9 QUESTIONS 1 AND 2: 0
SUM OF ALL RESPONSES TO PHQ9 QUESTIONS 1 AND 2: 0
2. FEELING DOWN, DEPRESSED OR HOPELESS: NOT AT ALL
1. LITTLE INTEREST OR PLEASURE IN DOING THINGS: NOT AT ALL

## 2020-05-04 ENCOUNTER — NURSE ONLY (OUTPATIENT)
Dept: FAMILY MEDICINE CLINIC | Facility: CLINIC | Age: 65
End: 2020-05-04
Payer: COMMERCIAL

## 2020-05-04 ENCOUNTER — VIRTUAL PHONE E/M (OUTPATIENT)
Dept: FAMILY MEDICINE CLINIC | Facility: CLINIC | Age: 65
End: 2020-05-04
Payer: COMMERCIAL

## 2020-05-04 ENCOUNTER — TELEPHONE (OUTPATIENT)
Dept: FAMILY MEDICINE CLINIC | Facility: CLINIC | Age: 65
End: 2020-05-04

## 2020-05-04 DIAGNOSIS — R31.0 GROSS HEMATURIA: Primary | ICD-10-CM

## 2020-05-04 DIAGNOSIS — R31.0 GROSS HEMATURIA: ICD-10-CM

## 2020-05-04 DIAGNOSIS — Z02.9 ENCOUNTERS FOR UNSPECIFIED ADMINISTRATIVE PURPOSE: Primary | ICD-10-CM

## 2020-05-04 PROCEDURE — 99213 OFFICE O/P EST LOW 20 MIN: CPT | Performed by: FAMILY MEDICINE

## 2020-05-04 PROCEDURE — 81001 URINALYSIS AUTO W/SCOPE: CPT | Performed by: FAMILY MEDICINE

## 2020-05-04 NOTE — PROGRESS NOTES
Patient to clinic for UA with culture reflex for KE.     Patient provided specimen   Urine sent in yellow and grey top tube

## 2020-05-04 NOTE — TELEPHONE ENCOUNTER
Patient states he is not so much having blood in the urine but he has noticed blood in \"his shorts\"  States it has happened 2 times. The first time was a month ago.  It was just a drop of blood  States today it was enough that it soaked through his under

## 2020-05-04 NOTE — TELEPHONE ENCOUNTER
Virtual Telephone Check-In    Dwaine Dickerson verbally consents to a Virtual/Telephone Check-In visit on 05/04/20. Patient understands and accepts financial responsibility for any deductible, co-insurance and/or co-pays associated with this service. Future    blood leaking from urethra. Will start with UA and look into imaging if needed once that result is back.        Colsarah Farrell, DO

## 2020-05-05 ENCOUNTER — TELEPHONE (OUTPATIENT)
Dept: FAMILY MEDICINE CLINIC | Facility: CLINIC | Age: 65
End: 2020-05-05

## 2020-05-05 NOTE — TELEPHONE ENCOUNTER
Patient advised. Verbalized understanding. Will call back to schedule RN visit for urine sample in 2-3 weeks.

## 2020-05-05 NOTE — TELEPHONE ENCOUNTER
----- Message from Bird De Guzman DO sent at 5/5/2020  9:37 AM CDT -----  My chart sent, pls confirm that he viewed it: [facility], your urine test was completely normal, no blood at all in the urine.  Lets continue to monitor for now, lets recheck another urin

## 2020-05-25 DIAGNOSIS — R41.3 MEMORY PROBLEM: ICD-10-CM

## 2020-05-26 RX ORDER — SERTRALINE HYDROCHLORIDE 25 MG/1
TABLET, FILM COATED ORAL
Qty: 90 TABLET | Refills: 0 | Status: SHIPPED | OUTPATIENT
Start: 2020-05-26 | End: 2020-09-16

## 2020-05-26 NOTE — TELEPHONE ENCOUNTER
No refill protocol for these medications. Sertraline 50mg and 25mg    Last refill: 10- (both) #90 with 1 refill    Last Visit: 10-  Next Visit: No future appointments. Forward to Dr. Tom Morris please advise on refills. Thanks.

## 2020-06-01 ENCOUNTER — ORDER TRANSCRIPTION (OUTPATIENT)
Dept: ADMINISTRATIVE | Facility: HOSPITAL | Age: 65
End: 2020-06-01

## 2020-06-01 DIAGNOSIS — Z11.59 SPECIAL SCREENING EXAMINATION FOR UNSPECIFIED VIRAL DISEASE: ICD-10-CM

## 2020-06-01 DIAGNOSIS — Z01.818 OTHER SPECIFIED PRE-OPERATIVE EXAMINATION: Primary | ICD-10-CM

## 2020-06-08 ENCOUNTER — LAB ENCOUNTER (OUTPATIENT)
Dept: LAB | Facility: HOSPITAL | Age: 65
End: 2020-06-08
Attending: INTERNAL MEDICINE
Payer: COMMERCIAL

## 2020-06-08 DIAGNOSIS — Z01.812 PRE-PROCEDURE LAB EXAM: Primary | ICD-10-CM

## 2020-06-08 LAB
SARS-COV-2 RNA SPEC QL NAA+PROBE: NOT DETECTED
SPECIMEN SOURCE: NORMAL

## 2020-06-10 ENCOUNTER — HOSPITAL ENCOUNTER (OUTPATIENT)
Dept: CV DIAGNOSTICS | Age: 65
Discharge: HOME | End: 2020-06-10
Attending: INTERNAL MEDICINE
Payer: COMMERCIAL

## 2020-06-10 DIAGNOSIS — R07.9 CHEST PAIN: ICD-10-CM

## 2020-06-10 DIAGNOSIS — I25.10 ATHEROSCLEROSIS OF NATIVE CORONARY ARTERY OF NATIVE HEART WITHOUT ANGINA PECTORIS: ICD-10-CM

## 2020-06-10 DIAGNOSIS — Z95.5 PRESENCE OF CORONARY ANGIOPLASTY IMPLANT AND GRAFT: ICD-10-CM

## 2020-06-10 DIAGNOSIS — R94.39 ABNORMAL CARDIOVASCULAR STRESS TEST: ICD-10-CM

## 2020-06-10 DIAGNOSIS — E78.00 PURE HYPERCHOLESTEROLEMIA: ICD-10-CM

## 2020-06-10 DIAGNOSIS — I10 ESSENTIAL HYPERTENSION, BENIGN: ICD-10-CM

## 2020-06-10 PROCEDURE — 78452 HT MUSCLE IMAGE SPECT MULT: CPT | Performed by: INTERNAL MEDICINE

## 2020-06-10 PROCEDURE — 93018 CV STRESS TEST I&R ONLY: CPT | Performed by: INTERNAL MEDICINE

## 2020-06-10 PROCEDURE — 93017 CV STRESS TEST TRACING ONLY: CPT | Performed by: INTERNAL MEDICINE

## 2020-06-15 ENCOUNTER — TELEPHONE (OUTPATIENT)
Dept: CARDIOLOGY | Age: 65
End: 2020-06-15

## 2020-06-15 DIAGNOSIS — I25.10 ATHEROSCLEROSIS OF NATIVE CORONARY ARTERY OF NATIVE HEART WITHOUT ANGINA PECTORIS: ICD-10-CM

## 2020-06-15 DIAGNOSIS — Z95.5 PRESENCE OF CORONARY ANGIOPLASTY IMPLANT AND GRAFT: ICD-10-CM

## 2020-06-15 DIAGNOSIS — I10 ESSENTIAL (PRIMARY) HYPERTENSION: ICD-10-CM

## 2020-06-15 DIAGNOSIS — R07.2 CHEST PAIN, PRECORDIAL: ICD-10-CM

## 2020-06-15 DIAGNOSIS — E78.00 PURE HYPERCHOLESTEROLEMIA: ICD-10-CM

## 2020-06-15 DIAGNOSIS — R94.39 ABNORMAL CARDIOVASCULAR STRESS TEST: ICD-10-CM

## 2020-09-16 DIAGNOSIS — R41.3 MEMORY PROBLEM: ICD-10-CM

## 2020-09-16 RX ORDER — SERTRALINE HYDROCHLORIDE 25 MG/1
25 TABLET, FILM COATED ORAL DAILY
Qty: 90 TABLET | Refills: 1 | Status: SHIPPED | OUTPATIENT
Start: 2020-09-16 | End: 2021-04-19

## 2020-09-16 NOTE — TELEPHONE ENCOUNTER
SERTRALINE HCL 25 MG Oral Tab 90 tablet 0 5/26/2020    Sig:   TAKE ONE TABLET BY MOUTH DAILY       SERTRALINE HCL 50 MG Oral Tab 90 tablet 0 5/26/2020    Sig:   TAKE ONE TABLET BY MOUTH DAILY       Routing to provider per protocol.     Last refilled on 5/26

## 2020-10-03 ENCOUNTER — TELEPHONE (OUTPATIENT)
Dept: FAMILY MEDICINE CLINIC | Facility: CLINIC | Age: 65
End: 2020-10-03

## 2020-10-03 NOTE — TELEPHONE ENCOUNTER
I don't think he needs an order. He can go to [facility] and get it done. He may need to schedule the appt at [facility] in [place] ahead of time to get it done. He should call or arrange that now. I don't think any order I place will be good in [place], unfortunately.

## 2020-10-03 NOTE — TELEPHONE ENCOUNTER
Pt states he is would like to go visit his mother who is in  Nursing home in [place]. There policy is pt needs to have a negative COVID test 72 hours prior to visit. Pt is asking if Dr. Chin Round will place a script for pt to take to get test done in [place].  P

## 2020-10-03 NOTE — TELEPHONE ENCOUNTER
Pt called would like to speak with someone about getting a covid test. Pt states he would need to get it done in [place] so that he can go see his mother at a nursing home.

## 2021-03-12 DIAGNOSIS — Z23 NEED FOR VACCINATION: ICD-10-CM

## 2021-04-16 NOTE — PROGRESS NOTES
.Dx:     Chronic right hip pain        Insurance (Authorized # of Visits):           Authorizing Physician: Dr. Randi Kaba    Next MD visit: none scheduled  Fall Risk: standard           Precautions: n/a             Subjective:   Right leg has had some hurt ove 8/8/2019  Tx#:  4 Date: 8/15/2019  Tx#:  5   Date: 8/19/2019  Tx#:  6     -Joint mobilization right hip supine including caudal glide with belt and LAD -Joint mobilization right hip consisting of inferior glide with mobilization belt, lateral glide with mo resistance with pattern of hip extension, hip abduction hip IR in supine R LE -Manual resistance with pattern of hip extension, hip abduction hip IR in supine R LE -Supine bridge LE on stability ball 10 second hold x 15 reps; supine bridge on stability bal lateral trunk lean to right @ stance phase; mild decrease hip IR during terminal stance; ascending stairs is with mild apprehension  Functional assessment:    -Limited ROM and familiar gluteal pain with crossing right leg over left  -Increased sway with si performance of the hip region. He would benefit from addressing such impairments to address current complaint and enhance function.   Likely responsive to multi-modal intervention consisting of manual therapy and therapeutic exercise with home program inst via fax as soon as possible to [phone].  If you have any questions, please contact me at Dept: [phone]    Sincerely,  Electronically signed by therapist: Smiley Vazquez, PT    [de-identified] certification required: Yes  I certify the need for these s Cheek Interpolation Flap Text: A decision was made to reconstruct the defect utilizing an interpolation axial flap and a staged reconstruction.  A telfa template was made of the defect.  This telfa template was then used to outline the Cheek Interpolation flap.  The donor area for the pedicle flap was then injected with anesthesia.  The flap was excised through the skin and subcutaneous tissue down to the layer of the underlying musculature.  The interpolation flap was carefully excised within this deep plane to maintain its blood supply.  The edges of the donor site were undermined.   The donor site was closed in a primary fashion.  The pedicle was then rotated into position and sutured.  Once the tube was sutured into place, adequate blood supply was confirmed with blanching and refill.  The pedicle was then wrapped with xeroform gauze and dressed appropriately with a telfa and gauze bandage to ensure continued blood supply and protect the attached pedicle.

## 2021-04-19 ENCOUNTER — OFFICE VISIT (OUTPATIENT)
Dept: FAMILY MEDICINE CLINIC | Facility: CLINIC | Age: 66
End: 2021-04-19
Payer: MEDICARE

## 2021-04-19 VITALS
OXYGEN SATURATION: 99 % | RESPIRATION RATE: 18 BRPM | HEART RATE: 64 BPM | WEIGHT: 192.13 LBS | DIASTOLIC BLOOD PRESSURE: 76 MMHG | SYSTOLIC BLOOD PRESSURE: 132 MMHG | BODY MASS INDEX: 29.12 KG/M2 | TEMPERATURE: 98 F | HEIGHT: 68 IN

## 2021-04-19 DIAGNOSIS — Z00.00 ENCOUNTER FOR ANNUAL HEALTH EXAMINATION: Primary | ICD-10-CM

## 2021-04-19 DIAGNOSIS — Z95.5 PRESENCE OF DRUG COATED STENT IN LAD CORONARY ARTERY: ICD-10-CM

## 2021-04-19 DIAGNOSIS — R41.3 MEMORY PROBLEM: ICD-10-CM

## 2021-04-19 DIAGNOSIS — G47.33 OSA (OBSTRUCTIVE SLEEP APNEA): ICD-10-CM

## 2021-04-19 DIAGNOSIS — L30.9 DERMATITIS: ICD-10-CM

## 2021-04-19 DIAGNOSIS — Z12.5 SCREENING FOR MALIGNANT NEOPLASM OF PROSTATE: ICD-10-CM

## 2021-04-19 DIAGNOSIS — Z23 NEED FOR VACCINATION: ICD-10-CM

## 2021-04-19 DIAGNOSIS — Z11.59 NEED FOR HEPATITIS C SCREENING TEST: ICD-10-CM

## 2021-04-19 DIAGNOSIS — R06.83 LOUD SNORING: ICD-10-CM

## 2021-04-19 DIAGNOSIS — Z95.5 HISTORY OF PLACEMENT OF STENT IN ANTERIOR DESCENDING BRANCH OF LEFT CORONARY ARTERY: ICD-10-CM

## 2021-04-19 DIAGNOSIS — I10 ESSENTIAL HYPERTENSION: ICD-10-CM

## 2021-04-19 DIAGNOSIS — I25.10 ATHEROSCLEROSIS OF NATIVE CORONARY ARTERY OF NATIVE HEART WITHOUT ANGINA PECTORIS: ICD-10-CM

## 2021-04-19 PROBLEM — R20.2 PARESTHESIAS IN RIGHT HAND: Status: RESOLVED | Noted: 2018-10-30 | Resolved: 2021-04-19

## 2021-04-19 PROCEDURE — G0009 ADMIN PNEUMOCOCCAL VACCINE: HCPCS | Performed by: FAMILY MEDICINE

## 2021-04-19 PROCEDURE — 80053 COMPREHEN METABOLIC PANEL: CPT | Performed by: FAMILY MEDICINE

## 2021-04-19 PROCEDURE — 86803 HEPATITIS C AB TEST: CPT | Performed by: FAMILY MEDICINE

## 2021-04-19 PROCEDURE — 85025 COMPLETE CBC W/AUTO DIFF WBC: CPT | Performed by: FAMILY MEDICINE

## 2021-04-19 PROCEDURE — 90732 PPSV23 VACC 2 YRS+ SUBQ/IM: CPT | Performed by: FAMILY MEDICINE

## 2021-04-19 PROCEDURE — G0438 PPPS, INITIAL VISIT: HCPCS | Performed by: FAMILY MEDICINE

## 2021-04-19 PROCEDURE — 80061 LIPID PANEL: CPT | Performed by: FAMILY MEDICINE

## 2021-04-19 RX ORDER — SERTRALINE HYDROCHLORIDE 25 MG/1
25 TABLET, FILM COATED ORAL DAILY
Qty: 90 TABLET | Refills: 3 | Status: SHIPPED | OUTPATIENT
Start: 2021-04-19

## 2021-04-19 RX ORDER — ATORVASTATIN CALCIUM 20 MG/1
20 TABLET, FILM COATED ORAL DAILY
Qty: 90 TABLET | Refills: 3 | Status: SHIPPED | OUTPATIENT
Start: 2021-04-19

## 2021-04-19 RX ORDER — PROPRANOLOL/HYDROCHLOROTHIAZID 40 MG-25MG
TABLET ORAL
COMMUNITY

## 2021-04-19 RX ORDER — METOPROLOL SUCCINATE 50 MG/1
50 TABLET, EXTENDED RELEASE ORAL DAILY
Qty: 90 TABLET | Refills: 3 | Status: SHIPPED | OUTPATIENT
Start: 2021-04-19 | End: 2021-04-22

## 2021-04-19 NOTE — PATIENT INSTRUCTIONS
Adam Villasenor's SCREENING SCHEDULE   Tests on this list are recommended by your physician but may not be covered, or covered at this frequency, by your insurer. Please check with your insurance carrier before scheduling to verify coverage.     PREVENT more often if abnormal Colonoscopy due on 12/03/2024 Update [facility] if applicable    Flex Sigmoidoscopy Screen  Covered every 5 years No results found for this or any previous visit. No flowsheet data found.      Fecal Occult Blood   Covered [place] with a cut with metal- TD and TDaP Not covered by Medicare Part B) Orders placed or performed in visit on 02/14/20   • TETANUS, DIPHTHERIA TOXOIDS AND ACELLULAR PERTUSIS VACCINE (TDAP), >7 YEARS, IM USE    This may be covered with your prescription benefit

## 2021-04-19 NOTE — PROGRESS NOTES
HPI:   Richelle Clarke is a 77year old male who presents for a Medicare Initial Preventative Physical Exam (Welcome to Medicare- < 12 months on Medicare). Has a rash on his inner arms, R>L.  Noticed it after his COVID shot last month, but not sure whe does NOT have a Power of  for [employer] on file in [address]    Advance care planning including the explanation and discussion of advance directives standard forms performed Face to Face with patient and Family/surrogate (if present), and forms availa External Cream, Apply 1 Application topically 2 (two) times daily. atorvastatin 20 MG Oral Tab, Take 1 tablet (20 mg total) by mouth daily. Metoprolol Succinate ER 50 MG Oral Tablet 24 Hr, Take 1 tablet (50 mg total) by mouth daily.   Sertraline HCl 50 MG hx of allergy or asthma    EXAM:   /76   Pulse 64   Temp 97.8 °F (36.6 °C) (Temporal)   Resp 18   Ht 5' 8\" (1.727 m)   Wt 192 lb 2 oz (87.1 kg)   SpO2 99%   BMI 29.21 kg/m²   Estimated body mass index is 29.21 kg/m² as calculated from the following: discoloration, no scaling or central clearing in [facility] areas b/l R>L.    Lymph nodes: Cervical, supraclavicular, and axillary nodes normal   Neurologic: Normal            Vaccination History     Immunization History   Administered Date(s) Administered   • Covi for needing a cpap. FRANKLIN (obstructive sleep apnea)  -     OP REFERRAL TO DIAGNOSTIC SLEEP STUDY  -     GENERAL SLEEP STUDY TRANSCRIPTION;  Future    Screening for malignant neoplasm of prostate  -     PSA SCREEN; Future  -     VENIPUNCTURE    Dermatitis 11/19/2015 5.6       No flowsheet data found.     Fasting Blood Sugar (FSB)Annually Glucose (mg/dL)   Date Value   02/20/2019 101 (H)       Cardiovascular Disease Screening     LDL Annually LDL Cholesterol (mg/dL)   Date Value   02/20/2019 54        EKG - vaccine history found This may be covered with your pharmacy  prescription benefits

## 2021-04-22 ENCOUNTER — TELEPHONE (OUTPATIENT)
Dept: FAMILY MEDICINE CLINIC | Facility: CLINIC | Age: 66
End: 2021-04-22

## 2021-04-22 DIAGNOSIS — I10 ESSENTIAL HYPERTENSION: ICD-10-CM

## 2021-04-22 RX ORDER — METOPROLOL SUCCINATE 50 MG/1
25 TABLET, EXTENDED RELEASE ORAL DAILY
Qty: 90 TABLET | Refills: 3 | Status: SHIPPED | OUTPATIENT
Start: 2021-04-22 | End: 2021-04-22 | Stop reason: DRUGHIGH

## 2021-04-22 RX ORDER — METOPROLOL SUCCINATE 25 MG/1
25 TABLET, EXTENDED RELEASE ORAL DAILY
Qty: 90 TABLET | Refills: 3 | Status: SHIPPED | OUTPATIENT
Start: 2021-04-22

## 2021-04-22 NOTE — TELEPHONE ENCOUNTER
Patient states he has been taking metoprolol 25 mg daily. Script that was sent in is for 50 mg tabs. Wants to know if he should take increased dose or take regular 25 mg dose. States 50 mg tabs are scored.  Will cut in half if OK

## 2021-04-22 NOTE — TELEPHONE ENCOUNTER
Patient notified and verbalized understanding. Patient aware new script will be sent for 25 mg tabs. Will take full tab when he picks up next script.

## 2021-04-22 NOTE — TELEPHONE ENCOUNTER
He can keep taking 25 mg, we must have just had the wrong dose on file. Sorry. He can take 1/2 if he is comfortable cutting them. Script adjusted.

## 2021-05-24 ENCOUNTER — ORDER TRANSCRIPTION (OUTPATIENT)
Dept: SLEEP CENTER | Age: 66
End: 2021-05-24

## 2021-05-24 DIAGNOSIS — Z01.818 PREOP EXAMINATION: Primary | ICD-10-CM

## 2021-05-24 DIAGNOSIS — Z11.59 SCREENING FOR VIRAL DISEASE: ICD-10-CM

## 2021-05-25 ENCOUNTER — LAB ENCOUNTER (OUTPATIENT)
Dept: LAB | Age: 66
End: 2021-05-25
Attending: FAMILY MEDICINE
Payer: MEDICARE

## 2021-05-25 DIAGNOSIS — Z11.59 SCREENING FOR VIRAL DISEASE: ICD-10-CM

## 2021-05-25 DIAGNOSIS — Z01.818 PREOP EXAMINATION: ICD-10-CM

## 2021-05-27 ENCOUNTER — OFFICE VISIT (OUTPATIENT)
Dept: SLEEP CENTER | Age: 66
End: 2021-05-27
Attending: FAMILY MEDICINE
Payer: MEDICARE

## 2021-05-27 DIAGNOSIS — R06.83 LOUD SNORING: ICD-10-CM

## 2021-05-27 DIAGNOSIS — G47.33 OSA (OBSTRUCTIVE SLEEP APNEA): ICD-10-CM

## 2021-05-27 PROCEDURE — 95811 POLYSOM 6/>YRS CPAP 4/> PARM: CPT

## 2021-12-29 ENCOUNTER — TELEPHONE (OUTPATIENT)
Dept: FAMILY MEDICINE CLINIC | Facility: CLINIC | Age: 66
End: 2021-12-29

## 2021-12-29 RX ORDER — CLOTRIMAZOLE AND BETAMETHASONE DIPROPIONATE 10; .64 MG/G; MG/G
CREAM TOPICAL
Qty: 60 G | Refills: 3 | Status: SHIPPED | OUTPATIENT
Start: 2021-12-29

## 2021-12-29 NOTE — TELEPHONE ENCOUNTER
Pt called he has developed a circular rash on the crook of his elbow. It is on his right arm, red, and has swelling in arm. Pt is going to be sending in picture on [employer] too.  He is wanting to know what the dr advised to do or if he needs to come in to be

## 2022-02-15 ENCOUNTER — TELEPHONE (OUTPATIENT)
Dept: FAMILY MEDICINE CLINIC | Facility: CLINIC | Age: 67
End: 2022-02-15

## 2022-02-15 ENCOUNTER — HOSPITAL ENCOUNTER (OUTPATIENT)
Dept: GENERAL RADIOLOGY | Age: 67
Discharge: HOME | End: 2022-02-15
Attending: FAMILY MEDICINE
Payer: MEDICARE

## 2022-02-15 ENCOUNTER — OFFICE VISIT (OUTPATIENT)
Dept: FAMILY MEDICINE CLINIC | Facility: CLINIC | Age: 67
End: 2022-02-15
Payer: MEDICARE

## 2022-02-15 VITALS
OXYGEN SATURATION: 96 % | WEIGHT: 190 LBS | HEART RATE: 76 BPM | BODY MASS INDEX: 28.79 KG/M2 | TEMPERATURE: 97 F | DIASTOLIC BLOOD PRESSURE: 70 MMHG | HEIGHT: 68 IN | SYSTOLIC BLOOD PRESSURE: 110 MMHG

## 2022-02-15 DIAGNOSIS — S46.911A STRAIN OF RIGHT SHOULDER, INITIAL ENCOUNTER: ICD-10-CM

## 2022-02-15 DIAGNOSIS — M54.31 RIGHT SCIATIC NERVE PAIN: ICD-10-CM

## 2022-02-15 DIAGNOSIS — M25.559 HIP PAIN: ICD-10-CM

## 2022-02-15 DIAGNOSIS — M25.559 HIP PAIN: Primary | ICD-10-CM

## 2022-02-15 DIAGNOSIS — M25.511 ACUTE PAIN OF RIGHT SHOULDER: ICD-10-CM

## 2022-02-15 PROCEDURE — 99214 OFFICE O/P EST MOD 30 MIN: CPT | Performed by: FAMILY MEDICINE

## 2022-02-15 PROCEDURE — 72110 X-RAY EXAM L-2 SPINE 4/>VWS: CPT | Performed by: FAMILY MEDICINE

## 2022-02-15 PROCEDURE — 73502 X-RAY EXAM HIP UNI 2-3 VIEWS: CPT | Performed by: FAMILY MEDICINE

## 2022-02-15 RX ORDER — MELOXICAM 15 MG/1
15 TABLET ORAL DAILY
Qty: 10 TABLET | Refills: 0 | Status: SHIPPED | OUTPATIENT
Start: 2022-02-15 | End: 2022-02-23

## 2022-02-15 NOTE — TELEPHONE ENCOUNTER
Patient states he has a spot on his abdomen that looks like it is herniated. States area has been there about 2 months. No pain, is not getting bigger. Patient states he is fine waiting until April when his annual physical is due.      Advised patient to let office know right away if any pain or getting bigger in size    cheyenne ALLEN

## 2022-02-15 NOTE — TELEPHONE ENCOUNTER
Patient mentioned he had another question for KE but she was unavailable. Left message on voicemail/answering machine for patient to call office to see what specific question was.  If needs evaluated now or can wait until he is due for annual physical in April

## 2022-02-16 ENCOUNTER — PATIENT MESSAGE (OUTPATIENT)
Dept: FAMILY MEDICINE CLINIC | Facility: CLINIC | Age: 67
End: 2022-02-16

## 2022-02-16 NOTE — TELEPHONE ENCOUNTER
From: Catherine Lewis  To: Marium Keith DO  Sent: 2/16/2022 11:23 AM CST  Subject: When to begin PT? Given the retrolisthesis shouldn't I be beginning PT sooner to assist in the spinal repair/realignment.

## 2022-02-21 ENCOUNTER — OFFICE VISIT (OUTPATIENT)
Dept: PHYSICAL THERAPY | Age: 67
End: 2022-02-21
Attending: FAMILY MEDICINE
Payer: MEDICARE

## 2022-02-21 DIAGNOSIS — M54.41 CHRONIC RIGHT-SIDED LOW BACK PAIN WITH RIGHT-SIDED SCIATICA: ICD-10-CM

## 2022-02-21 DIAGNOSIS — M43.10 RETROLISTHESIS OF VERTEBRAE: ICD-10-CM

## 2022-02-21 DIAGNOSIS — M54.31 RIGHT SCIATIC NERVE PAIN: ICD-10-CM

## 2022-02-21 DIAGNOSIS — M25.559 HIP PAIN: ICD-10-CM

## 2022-02-21 DIAGNOSIS — G89.29 CHRONIC RIGHT-SIDED LOW BACK PAIN WITH RIGHT-SIDED SCIATICA: ICD-10-CM

## 2022-02-21 PROCEDURE — 97110 THERAPEUTIC EXERCISES: CPT

## 2022-02-21 PROCEDURE — 97161 PT EVAL LOW COMPLEX 20 MIN: CPT

## 2022-02-23 ENCOUNTER — OFFICE VISIT (OUTPATIENT)
Dept: PHYSICAL THERAPY | Age: 67
End: 2022-02-23
Attending: FAMILY MEDICINE
Payer: MEDICARE

## 2022-02-23 PROCEDURE — 97110 THERAPEUTIC EXERCISES: CPT

## 2022-02-25 ENCOUNTER — OFFICE VISIT (OUTPATIENT)
Dept: PHYSICAL THERAPY | Age: 67
End: 2022-02-25
Attending: FAMILY MEDICINE
Payer: MEDICARE

## 2022-02-25 PROCEDURE — 97110 THERAPEUTIC EXERCISES: CPT

## 2022-02-28 ENCOUNTER — OFFICE VISIT (OUTPATIENT)
Dept: PHYSICAL THERAPY | Age: 67
End: 2022-02-28
Attending: FAMILY MEDICINE
Payer: MEDICARE

## 2022-02-28 ENCOUNTER — APPOINTMENT (OUTPATIENT)
Dept: PHYSICAL THERAPY | Age: 67
End: 2022-02-28
Attending: FAMILY MEDICINE
Payer: MEDICARE

## 2022-02-28 PROCEDURE — 97140 MANUAL THERAPY 1/> REGIONS: CPT

## 2022-02-28 PROCEDURE — 97110 THERAPEUTIC EXERCISES: CPT

## 2022-03-01 ENCOUNTER — TELEPHONE (OUTPATIENT)
Dept: PHYSICAL THERAPY | Age: 67
End: 2022-03-01

## 2022-03-01 ENCOUNTER — TELEPHONE (OUTPATIENT)
Dept: PHYSICAL THERAPY | Facility: HOSPITAL | Age: 67
End: 2022-03-01

## 2022-03-01 ENCOUNTER — OFFICE VISIT (OUTPATIENT)
Dept: PHYSICAL THERAPY | Age: 67
End: 2022-03-01
Attending: FAMILY MEDICINE
Payer: MEDICARE

## 2022-03-01 ENCOUNTER — APPOINTMENT (OUTPATIENT)
Dept: PHYSICAL THERAPY | Age: 67
End: 2022-03-01
Attending: FAMILY MEDICINE
Payer: MEDICARE

## 2022-03-01 PROCEDURE — 97110 THERAPEUTIC EXERCISES: CPT

## 2022-03-01 PROCEDURE — 97140 MANUAL THERAPY 1/> REGIONS: CPT

## 2022-03-04 ENCOUNTER — OFFICE VISIT (OUTPATIENT)
Dept: PHYSICAL THERAPY | Age: 67
End: 2022-03-04
Attending: FAMILY MEDICINE
Payer: MEDICARE

## 2022-03-04 ENCOUNTER — NURSE ONLY (OUTPATIENT)
Dept: FAMILY MEDICINE CLINIC | Facility: CLINIC | Age: 67
End: 2022-03-04
Payer: MEDICARE

## 2022-03-04 DIAGNOSIS — M25.50 ARTHRALGIA, UNSPECIFIED JOINT: ICD-10-CM

## 2022-03-04 DIAGNOSIS — L30.9 DERMATITIS: ICD-10-CM

## 2022-03-04 DIAGNOSIS — M25.559 HIP PAIN: ICD-10-CM

## 2022-03-04 PROCEDURE — 86618 LYME DISEASE ANTIBODY: CPT | Performed by: FAMILY MEDICINE

## 2022-03-04 PROCEDURE — 97140 MANUAL THERAPY 1/> REGIONS: CPT

## 2022-03-04 PROCEDURE — 97110 THERAPEUTIC EXERCISES: CPT

## 2022-03-04 NOTE — PROGRESS NOTES
Sarwat Perez present in office for nurse visit. Labs as ordered by Dr. Yadi Mendoza; 23 g, Right AC and gold tube     All questions/concerns addressed. Patient left in stable condition.

## 2022-03-07 ENCOUNTER — OFFICE VISIT (OUTPATIENT)
Dept: PHYSICAL THERAPY | Age: 67
End: 2022-03-07
Attending: FAMILY MEDICINE
Payer: MEDICARE

## 2022-03-07 LAB — B BURGDOR IGG+IGM SER QL: NEGATIVE

## 2022-03-07 PROCEDURE — 97140 MANUAL THERAPY 1/> REGIONS: CPT

## 2022-03-07 PROCEDURE — 97110 THERAPEUTIC EXERCISES: CPT

## 2022-03-07 NOTE — PROGRESS NOTES
Dx: Right sciatic nerve pain (M54.31)  Hip pain (M25.559)  Retrolisthesis of vertebrae (M43.10)  Chronic right-sided low back pain with right-sided sciatica (M54.41,G89.29)      Acute pain of right shoulder (M25.511)             Insurance (Authorized # of Visits):  Medicare/Supp         Authorizing Physician: Dr. Divine Armas MD visit: none scheduled    Fall Risk: standard         Precautions: n/a             Subjective: Lower Extremity pain is 0/10, leg now feels normal. Did home program did both hips to the R prone press up. Shoulder 6/10 pain at its worst. Improving, with more ROM and less pain. However, still limited sleep, reach overhead or get dressed. Did home program.      Objective:   Tested 3/7/2022:  Upper extremity:   Shoulder AROM:  Abduction: severe loss 162 degrees R, 145 degrees L*    Myotomes:  (Tested 2/28 on L shoulder)  Shoulder abduction supine: 3+/5* right, 5/5 left (sitting)    Intact:  Lower extremity:   Special tests:   Gait - normal speed, no deviations. Tested 3/4/2022:  Upper extremity:   Shoulder AROM:  Flexion: severe loss 65 degrees R* (L not tested)    Tested 3/1/2022:  Lumbar AROM: (* denotes performed with pain)  Flexion: min (feels left hamstring)  Extension: min  Sideglide R min/nil loss ; L min/nil loss (feels groin)      Tested 2/28/2022:  Upper extremity:   Shoulder AROM:  External rotation: min loss*. 70 degrees R, 90 degrees L  Hand behind back: min/nil loss* R  Extension: min loss* 50 degrees R vs. 62 L    Myotomes:  Shoulder external rotation 3+/5*, 5/5 left  Biceps: 4-/5* right, 5/5 left  Triceps: 4/5* right, 5/5 left      Tested 2/25/2022: Observation/Posture: better upright (but produce hip, somewhat better leg), worse slumping    Assessment: Reports R leg improving and home program progressed to prone press ups with hips in the center.  R shoulder ROM improved after cervical extension stretching unloaded in prone with better abduction ROM which will help patient reach up to get dressed and do heavy house work. Added to home program and strength progressed in clinic and for home program.       Goals: (to be met in 16 visits)   Pt will be independent with home program to allow for maintenance of goals achieved in therapy. Lower Extremity Goals:  Pt will improve Lower extremity functional scale (LEFS) score from 62/80 to 31/80 to display improved ability to stand to wash dishes  Pt will reduce pain at its worst from 8.5/10 to 5.5/10 to allow for improved ability to sleep  Pt will plantar flexion strength from 3-/5 on the right to equal bilaterally to allow for improved ability to ambulate to do shopping    Upper Extremity Goals:  Pt will improve Quick Dash 9 score from 60.5/100 to 40.5/100 to display improved ability to perform housework with his right arm. Pt will reduce pain at its worst from 9/10 to 6/10 to allow for improved ability to sleep with less pain in his right arm  Pt will improve shoulder flexion AROM from roughly 30 degrees to about 150 degrees to allow improved ability to reach overhead into shelves. Lower Extremity Functional Scale: 62/80  Quick Dash 9: 60.5    Plan:   Exhaust cervical extension stretching prone (manual prone to cervical spine)    Horizontal abduction stretching    AC joint glies. Contractile pain can be produced with isometric abduction/external rotation? (Assess produce, no worse loading)  Progress shoulder strength, use machines here and review home program.    Lower Extremities:  Future: Progress lumbar lateral stretching, perhaps hips to the middle if no better    Additional ideas for his shoulder:  Sleeper stretch stretching          Current HEP:   Every 2 hours  Prone cervical retraction/extension x10    1. Shoulder Abduction x10      Four times a day prone press up x10  Twice a day:  1. On stomach, raise arms up with hands by your hips 20 times, with soup cans  2.  ON side, shoulder external rotation 3lbs  TRX pull ups and push ups       Charges: 3 there ex,1 manual therapy     Total Timed Treatment: 55 min  Total Treatment Time: 55 min  Date: 2/23/2022  Tx#: 2 Date: 2/25/2022  Tx#: 3 Date: 2/28/2022  Tx#: 4 Date: 3/1/2022  Tx#: 5 Date: 3/4/2022  Tx#: 6 3/7/2022  Tx#: 7      Ther-Ex  Lumbar flexion/rotation knees to the right 4 minutes: produce leg pain, discontinued (attempted modification with and without left knee flexion and more mid-range, same effect)    Prone lying 5 minutes: maybe a bit better    Prone hips to the right 5 minutes: better    Prone press up hips to the R on elbows x4: no effect during, says \"not too bad\" on his shoulder (2nd set done 1 minute prone)    Cervical retraction x10: no effect    Cervical retraction/extension x5 and thoracic extension x8 with therapist overpressure: better shoulder    Cervical retraction/extension x15    Posture education    Discussed plan of care and pathology    Educated on home program, see below. Verbal, visual and tactile cues for there ex. There ex:  Prone press up hips to the R on elbows x15: during abolish leg pain    Cervical retraction/extension x13: after no better    Cervical retraction/extension with traction supine therapist overpressure x8: after a little    Thoracic extension therapist overpressure with cervical retraction/extension x15: after a little better. Cervical R lateral flexion x15: no effect    Cervical right rotation with therapist overpressure x10: no effect    Cervical left rotation with therapist overpressure x10: no effect    Shoulder extension with pole overpressure x10: no effect    Hand behind back x10: no effect    Shoulder horizontal adduction x10: better (2 more sets done)    [facility] joint glides by therapist 3 minutes    Self-AC joint glides 3 minutes    Explained pathology. Educated on home program, see below. Verbal, visual and tactile cues for there ex.    There ex:  Prone press up hips to the R on elbows x10 (held each rep 5 seconds): during abolish leg pain    Horizontal adduction x10: a bit better, 2nd set x20 therapist overpressure: more better, 3rd set therapist pressure x20 and 4th set 5 reps    Shoulder abduction AAROM 6-15x3: after a bit better    Supine shoulder flexion x15: produce, no worse    Supine 1lb shoulder flexion 10-15x2: produce pain towards the end, discontinued. Prone shoulder extension 20x2    Side lying shoulder abduction x10: feels easy, when goes up higher to restricted ROM is painful, discontinued    Side shoulder external rotation x10: feels easy, when goes up higher to restricted ROM is painful, discontinued    Educated on home program, see below. Verbal, visual and tactile cues for there ex. There ex:  Thoracic extension stretching sustained supine 3 minutes: no effect    Prone press up hips to the right x10: after faster gait speed. Orange band shoulder extension 20x2    Orange band rows x20    Supine shoulder flexion 1lbs 10x2    Prone shoulder extension x20    Shoulder extension pull down 14-18lbs 20x3    Rows 12lbs 20x2    Shoulder internal rotation and external rotation 6x 20x1-2    Shoulder external rotation 15x5: better    Shoulder horizontal adduction x15    Shoulder abduction with cane overpressure x15    Educated on home program, see below. Verbal, visual and tactile cues for there ex.    There ex:  Cervical supine retraction/extension 1-15x3: better shoulder abduction ROM    Shoulder external rotation 15x:     Shoulder horizontal adduction x15    Shoulder abduction with cane overpressure x15    Shoulder extension pull down 14lbs 20x3    Rows 12lbs 20x3    Shoulder external rotation 6x 20x1    Supine shoulder flexion 1lbs 15x    Prone shoulder extension x20    Prone press up hips to the right x10 (Told just do using left arm now, during painful in R arm) There ex:  Supine cervical retraction/extension sustained 70': no effect    Prone cervical retraction/extension 90 seconds: better    Shoulder external rotation with doorway ovepressure x20: no effect    TRX row 20x    TRX push up 20x    Shoulder abduction x10    Shoulder extension 20x with 1lbs    Shoulder abduction side lying 1lbs 20x2    Shoulder side lying external rotation 3lbs 20x2    Shoulder extension pull down 14lbs 20x2    Rows 12lbs 20x2    Shoulder external rotation 6x 20x1    Supine shoulder flexion 1lbs 15x    Educated on home program, see below. Verbal, visual and tactile cues for there ex.         Manual    Manual 15 mintues   AC joint glides by therapist 3 minutes: maybe a bit better    Posterior and inferior glides: somewhat better    Anterior shoulder glides: no better Manual 15 mintues   Posterior and inferior glides:     Anterior shoulder glides:  Manual 15 mintues   Posterior and inferior glides    Cervical supine retraction/extension with traction: better     Prone cervical R joint mobilization     Manual 15 mintues   Posterior and inferior glides and Cervical supine retraction/extension with traction: no effect      Posterior and inferior glides shoulder        N Re-Ed

## 2022-03-10 ENCOUNTER — OFFICE VISIT (OUTPATIENT)
Dept: PHYSICAL THERAPY | Age: 67
End: 2022-03-10
Attending: FAMILY MEDICINE
Payer: MEDICARE

## 2022-03-10 PROCEDURE — 97140 MANUAL THERAPY 1/> REGIONS: CPT

## 2022-03-10 PROCEDURE — 97110 THERAPEUTIC EXERCISES: CPT

## 2022-03-11 ENCOUNTER — TELEPHONE (OUTPATIENT)
Dept: FAMILY MEDICINE CLINIC | Facility: CLINIC | Age: 67
End: 2022-03-11

## 2022-03-11 ENCOUNTER — APPOINTMENT (OUTPATIENT)
Dept: PHYSICAL THERAPY | Age: 67
End: 2022-03-11
Attending: FAMILY MEDICINE
Payer: MEDICARE

## 2022-03-15 ENCOUNTER — OFFICE VISIT (OUTPATIENT)
Dept: PHYSICAL THERAPY | Age: 67
End: 2022-03-15
Attending: FAMILY MEDICINE
Payer: MEDICARE

## 2022-03-15 PROCEDURE — 97110 THERAPEUTIC EXERCISES: CPT

## 2022-03-17 ENCOUNTER — OFFICE VISIT (OUTPATIENT)
Dept: PHYSICAL THERAPY | Age: 67
End: 2022-03-17
Attending: FAMILY MEDICINE
Payer: MEDICARE

## 2022-03-17 PROCEDURE — 97140 MANUAL THERAPY 1/> REGIONS: CPT

## 2022-03-17 PROCEDURE — 97110 THERAPEUTIC EXERCISES: CPT

## 2022-03-23 ENCOUNTER — OFFICE VISIT (OUTPATIENT)
Dept: PHYSICAL THERAPY | Age: 67
End: 2022-03-23
Attending: FAMILY MEDICINE
Payer: MEDICARE

## 2022-03-23 PROCEDURE — 97140 MANUAL THERAPY 1/> REGIONS: CPT

## 2022-03-23 PROCEDURE — 97110 THERAPEUTIC EXERCISES: CPT

## 2022-03-29 ENCOUNTER — OFFICE VISIT (OUTPATIENT)
Dept: PHYSICAL THERAPY | Age: 67
End: 2022-03-29
Attending: FAMILY MEDICINE
Payer: MEDICARE

## 2022-03-29 PROCEDURE — 97110 THERAPEUTIC EXERCISES: CPT

## 2022-03-29 PROCEDURE — 97140 MANUAL THERAPY 1/> REGIONS: CPT

## 2022-04-04 ENCOUNTER — TELEPHONE (OUTPATIENT)
Dept: PHYSICAL THERAPY | Age: 67
End: 2022-04-04

## 2022-04-04 NOTE — TELEPHONE ENCOUNTER
Called to tell him that appointments on Wednesday were canceled due to a scheduling error.  Patient is being seen 1x a week, and appointment this week is tomorrow, Tuesday at 1:30pm.

## 2022-04-05 ENCOUNTER — OFFICE VISIT (OUTPATIENT)
Dept: PHYSICAL THERAPY | Age: 67
End: 2022-04-05
Attending: FAMILY MEDICINE
Payer: MEDICARE

## 2022-04-05 PROCEDURE — 97110 THERAPEUTIC EXERCISES: CPT

## 2022-04-05 PROCEDURE — 97140 MANUAL THERAPY 1/> REGIONS: CPT

## 2022-04-06 ENCOUNTER — APPOINTMENT (OUTPATIENT)
Dept: PHYSICAL THERAPY | Age: 67
End: 2022-04-06
Attending: FAMILY MEDICINE
Payer: MEDICARE

## 2022-04-06 ENCOUNTER — OFFICE VISIT (OUTPATIENT)
Dept: CARDIOLOGY | Age: 67
End: 2022-04-06

## 2022-04-06 VITALS
WEIGHT: 186 LBS | BODY MASS INDEX: 27.55 KG/M2 | HEART RATE: 78 BPM | DIASTOLIC BLOOD PRESSURE: 70 MMHG | HEIGHT: 69 IN | SYSTOLIC BLOOD PRESSURE: 117 MMHG

## 2022-04-06 DIAGNOSIS — I10 ESSENTIAL (PRIMARY) HYPERTENSION: ICD-10-CM

## 2022-04-06 DIAGNOSIS — R94.39 ABNORMAL CARDIOVASCULAR STRESS TEST: Primary | ICD-10-CM

## 2022-04-06 DIAGNOSIS — Z95.5 PRESENCE OF CORONARY ANGIOPLASTY IMPLANT AND GRAFT: ICD-10-CM

## 2022-04-06 DIAGNOSIS — R07.2 CHEST PAIN, PRECORDIAL: ICD-10-CM

## 2022-04-06 DIAGNOSIS — I25.10 ATHEROSCLEROSIS OF NATIVE CORONARY ARTERY OF NATIVE HEART WITHOUT ANGINA PECTORIS: ICD-10-CM

## 2022-04-06 DIAGNOSIS — E78.00 PURE HYPERCHOLESTEROLEMIA: ICD-10-CM

## 2022-04-06 PROCEDURE — 99214 OFFICE O/P EST MOD 30 MIN: CPT | Performed by: INTERNAL MEDICINE

## 2022-04-06 RX ORDER — MELOXICAM 15 MG/1
15 TABLET ORAL
COMMUNITY
Start: 2022-02-23 | End: 2023-05-18 | Stop reason: ALTCHOICE

## 2022-04-06 RX ORDER — CHLORAL HYDRATE 500 MG
1000 CAPSULE ORAL DAILY
COMMUNITY

## 2022-04-06 SDOH — HEALTH STABILITY: PHYSICAL HEALTH: ON AVERAGE, HOW MANY MINUTES DO YOU ENGAGE IN EXERCISE AT THIS LEVEL?: 0 MIN

## 2022-04-06 SDOH — HEALTH STABILITY: PHYSICAL HEALTH: ON AVERAGE, HOW MANY DAYS PER WEEK DO YOU ENGAGE IN MODERATE TO STRENUOUS EXERCISE (LIKE A BRISK WALK)?: 0 DAYS

## 2022-04-06 ASSESSMENT — PATIENT HEALTH QUESTIONNAIRE - PHQ9
CLINICAL INTERPRETATION OF PHQ2 SCORE: NO FURTHER SCREENING NEEDED
1. LITTLE INTEREST OR PLEASURE IN DOING THINGS: NOT AT ALL
SUM OF ALL RESPONSES TO PHQ9 QUESTIONS 1 AND 2: 0
SUM OF ALL RESPONSES TO PHQ9 QUESTIONS 1 AND 2: 0
2. FEELING DOWN, DEPRESSED OR HOPELESS: NOT AT ALL

## 2022-04-07 ENCOUNTER — MED REC SCAN ONLY (OUTPATIENT)
Dept: FAMILY MEDICINE CLINIC | Facility: CLINIC | Age: 67
End: 2022-04-07

## 2022-04-07 ENCOUNTER — ORDER TRANSCRIPTION (OUTPATIENT)
Dept: ADMINISTRATIVE | Facility: HOSPITAL | Age: 67
End: 2022-04-07

## 2022-04-11 ENCOUNTER — APPOINTMENT (OUTPATIENT)
Dept: PHYSICAL THERAPY | Age: 67
End: 2022-04-11
Attending: FAMILY MEDICINE
Payer: MEDICARE

## 2022-04-12 ENCOUNTER — TELEPHONE (OUTPATIENT)
Dept: FAMILY MEDICINE CLINIC | Facility: CLINIC | Age: 67
End: 2022-04-12

## 2022-04-12 ENCOUNTER — APPOINTMENT (OUTPATIENT)
Dept: PHYSICAL THERAPY | Age: 67
End: 2022-04-12
Attending: FAMILY MEDICINE
Payer: MEDICARE

## 2022-04-12 NOTE — TELEPHONE ENCOUNTER
Today I am traveling to my mother's bedside. She may be passing today. Sorry for short notice.   Patient sent this as a CRM request.

## 2022-04-14 ENCOUNTER — APPOINTMENT (OUTPATIENT)
Dept: PHYSICAL THERAPY | Age: 67
End: 2022-04-14
Attending: FAMILY MEDICINE
Payer: MEDICARE

## 2022-04-15 ENCOUNTER — APPOINTMENT (OUTPATIENT)
Dept: PHYSICAL THERAPY | Age: 67
End: 2022-04-15
Attending: FAMILY MEDICINE
Payer: MEDICARE

## 2022-04-20 ENCOUNTER — APPOINTMENT (OUTPATIENT)
Dept: PHYSICAL THERAPY | Age: 67
End: 2022-04-20
Attending: FAMILY MEDICINE
Payer: MEDICARE

## 2022-04-22 ENCOUNTER — LABORATORY ENCOUNTER (OUTPATIENT)
Dept: LAB | Age: 67
End: 2022-04-22
Attending: INTERNAL MEDICINE
Payer: MEDICARE

## 2022-04-22 ENCOUNTER — LAB ENCOUNTER (OUTPATIENT)
Dept: LAB | Age: 67
End: 2022-04-22
Attending: INTERNAL MEDICINE
Payer: MEDICARE

## 2022-04-22 DIAGNOSIS — I25.10 CORONARY ATHEROSCLEROSIS OF NATIVE CORONARY ARTERY: ICD-10-CM

## 2022-04-22 DIAGNOSIS — I10 ESSENTIAL HYPERTENSION, MALIGNANT: ICD-10-CM

## 2022-04-22 DIAGNOSIS — Z95.5 STENTED CORONARY ARTERY: ICD-10-CM

## 2022-04-22 DIAGNOSIS — R07.2 PRECORDIAL PAIN: ICD-10-CM

## 2022-04-22 DIAGNOSIS — Z01.818 PREOP EXAMINATION: ICD-10-CM

## 2022-04-22 DIAGNOSIS — Z11.59 SCREENING FOR VIRAL DISEASE: ICD-10-CM

## 2022-04-22 DIAGNOSIS — R94.39 ABNORMAL BALLISTOCARDIOGRAM: Primary | ICD-10-CM

## 2022-04-22 LAB
ALBUMIN SERPL-MCNC: 3.3 G/DL (ref 3.4–5)
ALBUMIN/GLOB SERPL: 1 {RATIO} (ref 1–2)
ALP LIVER SERPL-CCNC: 91 U/L
ALT SERPL-CCNC: 29 U/L
ANION GAP SERPL CALC-SCNC: 5 MMOL/L (ref 0–18)
AST SERPL-CCNC: 13 U/L (ref 15–37)
BASOPHILS # BLD AUTO: 0.06 X10(3) UL (ref 0–0.2)
BASOPHILS NFR BLD AUTO: 0.9 %
BILIRUB SERPL-MCNC: 0.6 MG/DL (ref 0.1–2)
BUN BLD-MCNC: 21 MG/DL (ref 7–18)
CALCIUM BLD-MCNC: 8.9 MG/DL (ref 8.5–10.1)
CHLORIDE SERPL-SCNC: 109 MMOL/L (ref 98–112)
CHOLEST SERPL-MCNC: 115 MG/DL (ref ?–200)
CO2 SERPL-SCNC: 29 MMOL/L (ref 21–32)
CREAT BLD-MCNC: 0.74 MG/DL
EOSINOPHIL # BLD AUTO: 0.08 X10(3) UL (ref 0–0.7)
EOSINOPHIL NFR BLD AUTO: 1.2 %
ERYTHROCYTE [DISTWIDTH] IN BLOOD BY AUTOMATED COUNT: 16.9 %
FASTING PATIENT LIPID ANSWER: YES
FASTING STATUS PATIENT QL REPORTED: YES
GLOBULIN PLAS-MCNC: 3.2 G/DL (ref 2.8–4.4)
GLUCOSE BLD-MCNC: 87 MG/DL (ref 70–99)
HCT VFR BLD AUTO: 38.9 %
HDLC SERPL-MCNC: 41 MG/DL (ref 40–59)
HGB BLD-MCNC: 12.1 G/DL
IMM GRANULOCYTES # BLD AUTO: 0.02 X10(3) UL (ref 0–1)
IMM GRANULOCYTES NFR BLD: 0.3 %
LDLC SERPL CALC-MCNC: 59 MG/DL (ref ?–100)
LYMPHOCYTES # BLD AUTO: 1.5 X10(3) UL (ref 1–4)
LYMPHOCYTES NFR BLD AUTO: 22.1 %
MCH RBC QN AUTO: 30.1 PG (ref 26–34)
MCHC RBC AUTO-ENTMCNC: 31.1 G/DL (ref 31–37)
MCV RBC AUTO: 96.8 FL
MONOCYTES # BLD AUTO: 0.57 X10(3) UL (ref 0.1–1)
MONOCYTES NFR BLD AUTO: 8.4 %
NEUTROPHILS # BLD AUTO: 4.56 X10 (3) UL (ref 1.5–7.7)
NEUTROPHILS # BLD AUTO: 4.56 X10(3) UL (ref 1.5–7.7)
NEUTROPHILS NFR BLD AUTO: 67.1 %
NONHDLC SERPL-MCNC: 74 MG/DL (ref ?–130)
OSMOLALITY SERPL CALC.SUM OF ELEC: 298 MOSM/KG (ref 275–295)
PLATELET # BLD AUTO: 337 10(3)UL (ref 150–450)
POTASSIUM SERPL-SCNC: 4.4 MMOL/L (ref 3.5–5.1)
PROT SERPL-MCNC: 6.5 G/DL (ref 6.4–8.2)
RBC # BLD AUTO: 4.02 X10(6)UL
SODIUM SERPL-SCNC: 143 MMOL/L (ref 136–145)
TRIGL SERPL-MCNC: 69 MG/DL (ref 30–149)
VLDLC SERPL CALC-MCNC: 10 MG/DL (ref 0–30)
WBC # BLD AUTO: 6.8 X10(3) UL (ref 4–11)

## 2022-04-22 PROCEDURE — 85025 COMPLETE CBC W/AUTO DIFF WBC: CPT

## 2022-04-22 PROCEDURE — 36415 COLL VENOUS BLD VENIPUNCTURE: CPT

## 2022-04-22 PROCEDURE — 80061 LIPID PANEL: CPT

## 2022-04-22 PROCEDURE — 80053 COMPREHEN METABOLIC PANEL: CPT

## 2022-04-23 LAB — SARS-COV-2 RNA RESP QL NAA+PROBE: NOT DETECTED

## 2022-04-25 ENCOUNTER — HOSPITAL ENCOUNTER (OUTPATIENT)
Dept: CV DIAGNOSTICS | Age: 67
Discharge: HOME | End: 2022-04-25
Attending: INTERNAL MEDICINE
Payer: MEDICARE

## 2022-04-25 ENCOUNTER — TELEPHONE (OUTPATIENT)
Dept: CARDIOLOGY | Age: 67
End: 2022-04-25

## 2022-04-25 ENCOUNTER — EXTERNAL RECORD (OUTPATIENT)
Dept: HEALTH INFORMATION MANAGEMENT | Facility: OTHER | Age: 67
End: 2022-04-25

## 2022-04-25 DIAGNOSIS — R94.39 ABNORMAL CARDIOVASCULAR STRESS TEST: ICD-10-CM

## 2022-04-25 DIAGNOSIS — Z95.5 PRESENCE OF CORONARY ANGIOPLASTY IMPLANT AND GRAFT: ICD-10-CM

## 2022-04-25 DIAGNOSIS — E78.00 PURE HYPERCHOLESTEROLEMIA: ICD-10-CM

## 2022-04-25 DIAGNOSIS — R07.2 PRECORDIAL CHEST PAIN: ICD-10-CM

## 2022-04-25 DIAGNOSIS — I10 ESSENTIAL HYPERTENSION: ICD-10-CM

## 2022-04-25 DIAGNOSIS — I25.10 ATHEROSCLEROSIS OF NATIVE CORONARY ARTERY OF NATIVE HEART WITHOUT ANGINA PECTORIS: ICD-10-CM

## 2022-04-25 PROCEDURE — 78452 HT MUSCLE IMAGE SPECT MULT: CPT | Performed by: INTERNAL MEDICINE

## 2022-04-25 PROCEDURE — 93018 CV STRESS TEST I&R ONLY: CPT | Performed by: INTERNAL MEDICINE

## 2022-04-25 PROCEDURE — 93017 CV STRESS TEST TRACING ONLY: CPT | Performed by: INTERNAL MEDICINE

## 2022-04-26 ENCOUNTER — OFFICE VISIT (OUTPATIENT)
Dept: FAMILY MEDICINE CLINIC | Facility: CLINIC | Age: 67
End: 2022-04-26
Payer: MEDICARE

## 2022-04-26 VITALS
SYSTOLIC BLOOD PRESSURE: 124 MMHG | DIASTOLIC BLOOD PRESSURE: 76 MMHG | HEIGHT: 68 IN | TEMPERATURE: 97 F | WEIGHT: 182 LBS | BODY MASS INDEX: 27.58 KG/M2 | HEART RATE: 68 BPM | RESPIRATION RATE: 16 BRPM

## 2022-04-26 DIAGNOSIS — G47.33 OSA (OBSTRUCTIVE SLEEP APNEA): ICD-10-CM

## 2022-04-26 DIAGNOSIS — M25.559 HIP PAIN: ICD-10-CM

## 2022-04-26 DIAGNOSIS — R53.82 CHRONIC FATIGUE: ICD-10-CM

## 2022-04-26 DIAGNOSIS — Z00.00 ENCOUNTER FOR ANNUAL HEALTH EXAMINATION: Primary | ICD-10-CM

## 2022-04-26 DIAGNOSIS — M25.531 ARTHRALGIA OF RIGHT WRIST: ICD-10-CM

## 2022-04-26 DIAGNOSIS — I10 ESSENTIAL HYPERTENSION: ICD-10-CM

## 2022-04-26 DIAGNOSIS — Z95.5 PRESENCE OF DRUG COATED STENT IN LAD CORONARY ARTERY: ICD-10-CM

## 2022-04-26 DIAGNOSIS — I25.10 ATHEROSCLEROSIS OF NATIVE CORONARY ARTERY OF NATIVE HEART WITHOUT ANGINA PECTORIS: ICD-10-CM

## 2022-04-26 DIAGNOSIS — Z95.5 HISTORY OF PLACEMENT OF STENT IN ANTERIOR DESCENDING BRANCH OF LEFT CORONARY ARTERY: ICD-10-CM

## 2022-04-26 DIAGNOSIS — D64.9 ANEMIA, UNSPECIFIED TYPE: ICD-10-CM

## 2022-04-26 DIAGNOSIS — Z12.5 SCREENING FOR MALIGNANT NEOPLASM OF PROSTATE: ICD-10-CM

## 2022-04-26 PROCEDURE — G0439 PPPS, SUBSEQ VISIT: HCPCS | Performed by: FAMILY MEDICINE

## 2022-04-26 RX ORDER — SERTRALINE HYDROCHLORIDE 25 MG/1
25 TABLET, FILM COATED ORAL DAILY
COMMUNITY
Start: 2022-03-14

## 2022-04-27 ENCOUNTER — TELEPHONE (OUTPATIENT)
Dept: FAMILY MEDICINE CLINIC | Facility: CLINIC | Age: 67
End: 2022-04-27

## 2022-04-27 ENCOUNTER — NURSE ONLY (OUTPATIENT)
Dept: FAMILY MEDICINE CLINIC | Facility: CLINIC | Age: 67
End: 2022-04-27
Payer: MEDICARE

## 2022-04-27 ENCOUNTER — OFFICE VISIT (OUTPATIENT)
Dept: PHYSICAL THERAPY | Age: 67
End: 2022-04-27
Attending: FAMILY MEDICINE
Payer: MEDICARE

## 2022-04-27 DIAGNOSIS — M25.531 ARTHRALGIA OF RIGHT WRIST: ICD-10-CM

## 2022-04-27 DIAGNOSIS — Z12.5 SCREENING FOR MALIGNANT NEOPLASM OF PROSTATE: ICD-10-CM

## 2022-04-27 DIAGNOSIS — R53.82 CHRONIC FATIGUE: ICD-10-CM

## 2022-04-27 DIAGNOSIS — D64.9 ANEMIA, UNSPECIFIED TYPE: ICD-10-CM

## 2022-04-27 PROBLEM — G47.33 OSA (OBSTRUCTIVE SLEEP APNEA): Status: ACTIVE | Noted: 2022-04-27

## 2022-04-27 PROBLEM — M25.559 HIP PAIN: Status: ACTIVE | Noted: 2022-04-27

## 2022-04-27 LAB
BASOPHILS # BLD AUTO: 0.09 X10(3) UL (ref 0–0.2)
BASOPHILS NFR BLD AUTO: 0.9 %
COMPLEXED PSA SERPL-MCNC: 0.84 NG/ML (ref ?–4)
DEPRECATED HBV CORE AB SER IA-ACNC: 214.3 NG/ML
EOSINOPHIL # BLD AUTO: 0.08 X10(3) UL (ref 0–0.7)
EOSINOPHIL NFR BLD AUTO: 0.8 %
ERYTHROCYTE [DISTWIDTH] IN BLOOD BY AUTOMATED COUNT: 16.6 %
HCT VFR BLD AUTO: 42.5 %
HGB BLD-MCNC: 13.4 G/DL
IMM GRANULOCYTES # BLD AUTO: 0.04 X10(3) UL (ref 0–1)
IMM GRANULOCYTES NFR BLD: 0.4 %
IRON SATN MFR SERPL: 13 %
IRON SERPL-MCNC: 47 UG/DL
LYMPHOCYTES # BLD AUTO: 2.19 X10(3) UL (ref 1–4)
LYMPHOCYTES NFR BLD AUTO: 22.3 %
MCH RBC QN AUTO: 29.6 PG (ref 26–34)
MCHC RBC AUTO-ENTMCNC: 31.5 G/DL (ref 31–37)
MCV RBC AUTO: 93.8 FL
MONOCYTES # BLD AUTO: 0.86 X10(3) UL (ref 0.1–1)
MONOCYTES NFR BLD AUTO: 8.7 %
NEUTROPHILS # BLD AUTO: 6.57 X10 (3) UL (ref 1.5–7.7)
NEUTROPHILS # BLD AUTO: 6.57 X10(3) UL (ref 1.5–7.7)
NEUTROPHILS NFR BLD AUTO: 66.9 %
PLATELET # BLD AUTO: 387 10(3)UL (ref 150–450)
RBC # BLD AUTO: 4.53 X10(6)UL
TIBC SERPL-MCNC: 367 UG/DL (ref 240–450)
TRANSFERRIN SERPL-MCNC: 246 MG/DL (ref 200–360)
TSI SER-ACNC: 1.64 MIU/ML (ref 0.36–3.74)
WBC # BLD AUTO: 9.8 X10(3) UL (ref 4–11)

## 2022-04-27 PROCEDURE — 97110 THERAPEUTIC EXERCISES: CPT

## 2022-04-27 PROCEDURE — 83550 IRON BINDING TEST: CPT | Performed by: FAMILY MEDICINE

## 2022-04-27 PROCEDURE — 84443 ASSAY THYROID STIM HORMONE: CPT | Performed by: FAMILY MEDICINE

## 2022-04-27 PROCEDURE — 97140 MANUAL THERAPY 1/> REGIONS: CPT

## 2022-04-27 PROCEDURE — 85025 COMPLETE CBC W/AUTO DIFF WBC: CPT | Performed by: FAMILY MEDICINE

## 2022-04-27 PROCEDURE — 83540 ASSAY OF IRON: CPT | Performed by: FAMILY MEDICINE

## 2022-04-27 PROCEDURE — 82728 ASSAY OF FERRITIN: CPT | Performed by: FAMILY MEDICINE

## 2022-04-27 NOTE — PROGRESS NOTES
Dx: Right sciatic nerve pain (M54.31)  Hip pain (M25.559)  Retrolisthesis of vertebrae (M43.10)  Chronic right-sided low back pain with right-sided sciatica (M54.41,G89.29)      Acute pain of right shoulder (M25.511)  Acute pain of right shoulder (M25.512)             Insurance (Authorized # of Visits):  Medicare/Supp         Authorizing Physician: Dr. Lilibeth Dudley  Next MD visit: none scheduled    Fall Risk: standard         Precautions: n/a             Subjective: Worse, more pain in both shoulders, R shoulder is weak with raising dinner plates up to the shelf and painful in either arm with reaching with housework. Pain 8/10 R shoulder at its worst and 6/10 in L shoulder. R hand pain about 8/10 at its worst with grabbing objects, plus mother passing away and traveling out [facility] for that caused no home program in last three weeks since last Physical Therapy session. Lower Extremity is painful 3/10 in L knee with stairs and R hip stiff 2/10 pain with getting into or out of cars, or walking in the woods. Objective:   Tested 4/5/2022:  Upper extremity:   Abduction: full range but pain during motion on the R*  Horizontal abduction: 8 degrees R*, 2 degrees* L  Hand behind back: L2 R and L3 L (pain bilaterally)*  Horizontal adduction: min/mod loss R*, min L*  Extension:  R min/nil, end range pain on L    Special Tests:  Make a fist squeeze* (R hand)  Pull wrist into extension with other hand*  3lbs scaption: pain bilaterally (R worse than L) min loss*  5lbs ankle weight scaption R arm: mod loss*  Big step R leg*    Manual Muscle Tests:  Palmetto test* (painful bilaterally in this position, resisted not tested)    Intact:  Manual Muscle Tests:  Thumb extension 5/5 R and 5/5 L        Tested 4/5/2022:  Prone shoulder Manual Muscle Tests:  Rhomboid: 3+/5* left and 3+/5 R*  Middle Trap: 5/5 left and 4-/5 R*  Lower Trap: 4/5 L and 3-/5 R*   Lats: 5/5 left and 4/5 R        Assessment: Limited home program is a concern. Motivational interviewing to increase adherence and discussed discharge from therapy in near future with goal to give him a home program that works for his hand. Home program modified due to R hand pain. No hand pain with current home program, and baselines better after. Sleeper stretch added as well, which further improves R shoulder strength and ROM. Goals: (to be met in 20 visits)   Pt will be independent with home program to allow for maintenance of goals achieved in therapy. Lower Extremity Goals:  Pt will improve Lower extremity functional scale (LEFS) score from 62/80 to 31/80 to display improved ability to stand to wash dishes (Goal met 3/1/2022)  Pt will reduce pain at its worst from 8.5/10 to 5.5/10 to allow for improved ability to sleep (Goal met 3/24/2022)  Pt will plantar flexion strength from 3-/5 on the right to equal bilaterally to allow for improved ability to ambulate to do shopping (Goal met 3/1/2022)    R Shoulder Goals:  Pt will improve Quick Dash 9 score from 60.5/100 to 40.5/100 to display improved ability to perform housework with his right arm. (75% progress, score is 44 as of 3/23/2022)  Pt will reduce pain at its worst from 9/10 to 6/10 to allow for improved ability to sleep with less pain in his right arm (Goal met 4/5/2022)  Pt will improve shoulder abduction AROM from roughly 30 degrees to about 150 degrees to allow improved ability to reach overhead into shelves. (Goal met 3/23/2022)    L Shoulder Goals (Started 3/23/2022): Pt will improve Quick Dash 9 score from 38.5/100 to 28.5/100 to display improved ability to perform housework with his right arm. (To be assessed)  Pt will reduce pain at its worst from 7/10 to 4/10 to allow for improved ability to reach with his left arm (50% progress on 4/5/2022)  Pt will improve shoulder abduction AROM from 78 degrees to 170 degrees to allow improved ability to reach overhead into shelves. (Goal met on 4/5/2022)        Plan:   Hold accountable to home program  See before and after home program    Prone thoracic/cervical stretch with strap overpressure: (his home program) see effect  See again with cervical extension component    Future: If no better sitting thoracic extension    R shoulder:  Extension in external rotation bias  Disfunction to horizontal abduction stretching with AC joint glides by therapist (Disfunction to horizontal abduction stretching)      R shoulder future repeated movements to check:  Flexion   ER in 90 degrees flexion    L shoulder:  Exhaust extension stretching  Disfunction to horizontal abduction stretching with AC joint glides by therapist (Disfunction to horizontal abduction stretching)    Future L shoulder:  Exhaust/explore other movements to his left shoulder. Other:  (How does he sleep?)    AC joint glides and posterior shoulder glides     Progress scapular strength    Progress shoulder strength, use machines here and review home program.        Previous response to loading:  Prone cervical/retraction/extension with thoracic spine extension stretch with strap overpressure 2 minutes (3 sets done): better after (Note: no pain R hand pain during):  After better abduction AROM is full (3/29/2022)    Prone cervical retraction/extension  3 minutes: after a bit better (Note: no R hand pain during)(3/29/2022)      Supine on foam roll thoracic stretch with therapist overpressure x20: maybe a bit better (3/29/2022)        Cervical supine retraction/extension 1-15x3: better shoulder abduction ROM (3/7/2022)      Thoracic extension stretching sustained supine 3 minutes: no effect (3/1/2022)            R shoulder extension with external rotation bias x20: a bit better horizontal abduction AROM. (3/29/2022)    Hand behind back R shoulder x20: produce, no worse After a bit better. (3/29/2022)    Horizontal abduction stretching x10: produce, no worse (3/17/2022)    Shoulder external rotation 15x:  (3/4/2022)    Shoulder horizontal adduction x15 (3/4/2022)    Shoulder abduction with cane overpressure x15 (3/4/2022)    AC joint glides by therapist 3 minutes: maybe a bit better (2/25/2022)    Shoulder extension with pole overpressure x10: no effect (2/23/2022)    Hand behind back x10: no effect (2/23/2022)            L shoulder extension stretching x20: produce, no worse. After a bit better. (3/29/2022)                    Charges: 4 there ex, 1 manual therapy   Total Timed Treatment: 75 min  Total Treatment Time: 75 min  3/17/2022  Tx#: 10 3/23/2022  Tx#: 11 3/29/2022  Tx: 12 4/5/2022  Tx: 13 4/27/2022  Tx#: 14       There ex:  Prone cervical retraction/extension   3 minutes: no effect    Horizontal abduction stretching x10: produce, no worse    Hand behind back stretching x10: abolish pain to internal rotation stretching, no change to horizontal abduction stretching. Hands on wall for scapula clock (up, down, together, and forward) 5x    Scapular retraction with band for external rotation 20x    Dips in chair 10-20x2    Row 18lbs x20    TRX push ups, pull ups, triceps pull, pec fly 20x (cues for push up plus with TRX)    Educated on home program, see below. Verbal, visual and tactile cues for there ex. There ex:  Prone cervical retraction/extension   3 minutes: better (2 sets done)    Hand behind back x10: a little better after    TRX push ups, 10-20x2 (cues for push up plus with TRX)    Dips in chair 20x    Scapular retraction with band for external rotation 20x    Motivational interviewing to increase adherence    Re-assessed progress    Educated on home program, see below. Verbal, visual and tactile cues for there ex. There ex:  Prone cervical retraction/extension  3 minutes: after a bit better (Note: no R hand pain during)    Prone cervical/retraction/extension with thoracic spine extension stretch with strap overpressure 2 minutes (3 sets done): better after (Note: no pain R hand pain during):  After better abduction AROM is full    Supine on foam roll thoracic stretch with therapist overpressure x20: maybe a bit better    R shoulder extension with external rotation bias x20: a bit better horizontal abduction AROM. TRX push up 20x, triceps extension 20x, rows 20x2, chest fly 10x (chest produced R hand pain, discontinued)    Hand behind back R shoulder x20: produce, no worse After a bit better. L shoulder extension stretching x20: produce, no worse. After a bit better. Row cable machine 16lbs 20x2    Green band flexion loop band with scapular contraction 20x    Dips x5: produce R hand pain, discontinued    Shoulder retraction with external rotation and green band x20      Educated on home program, see below. Verbal, visual and tactile cues for there ex. There ex:  Prone cervical/retraction/extension with thoracic spine extension stretch with strap overpressure 3 minutes: After horizontal adduction better on R arm and prone manual muscles tests better on R arm (2 sets done)    Hand behind back x20 with strap: R arm more improved, better. (After both L arm somewhat better) (2 sets done)    Dip x5: produce hand pain, discontinued. Row cable machine 16lbs 20x2    Therband blue horizontal abduction with elbows bent and straight, flexion, extension, external rotation x20 (2 sets)    Motivational interveiwing to increase adherence    Discussed plan of care. Educated on home program, see below. Verbal, visual and tactile cues for there ex. There ex:  Sitting thoracic extension with ball behind upper back x20: no better (during gets more thoracic ROM, not done with cervical retraction/extension stretching)    Prone cervical/retraction/extension with thoracic spine extension stretch with strap overpressure 3 minutes: After horizontal adduction better bilaterally, and a bit more strength in arm.  (Done on elbows prone up on forearms (2 sets done) (R leg pain abolished as well, briefly held as lumbar stretch initially)    Hand behind back with strap x15 (bilaterally): better L shoulder abduction lifting but L shoulder horizontal abduction maybe a bit worse, 2nd set on L shoulder therapist overpressure: better L shoulder (No effect R shoulder) (3rd set L shoulder)    Sleeper stretch x20-30 (2 sets): better first set    Loop band 10-20 (2 sets) red/blue: external rotation, horizontal abduction (elbows bent and straight), bow and arrow, shoulder extension (no hand pain with this, done with wrists)        Educated on home program, see below. Verbal, visual and tactile cues for there ex.          Manual 15 mintues   Posterior and shoulder  inferior glides        Manual 15 mintues   Posterior and inferior glides and Cervical/thoracic mobilization       Manual 15 mintues   Shoulder posterior and inferior glides and Cervical/thoracic mobilization   Manual 15 mintues   Shoulder posterior and inferior glides and Cervical/thoracic mobilization Manual 15 mintues   Shoulder posterior and inferior as well as anterior glides and Cervical/thoracic mobilization

## 2022-04-27 NOTE — PROGRESS NOTES
Patient presented today for lab draw.  1 gold, 1 lav tube(s) drawn from right ac area x1 with 21G butterfly needle. Patient tolerated well and left office in stable condition.

## 2022-04-27 NOTE — TELEPHONE ENCOUNTER
----- Message from Jules Zepeda, DO sent at 4/27/2022 10:58 AM CDT -----  Can you call Troy and let him know we couldn't add the labs. He can come back in another couple weeks and get them done. It isn't urgent he do them right away.    Thanks, KLE

## 2022-04-27 NOTE — TELEPHONE ENCOUNTER
Lisbeth Rosas notified of information listed below and verbalized understanding. No further questions at this time.    Future Appointments   Date Time Provider Jen Strat[place]   4/27/2022  1:45 PM Raulito Peralta Phys T Tyson   4/27/2022  3:00 PM [facility] [place] [facility] NURSE [place] [address] [facility]   5/4/2022 12:00 PM JOSE Peralta Phys T Tyson   5/18/2022 11:15 AM Ana Kay PT [facility] Phys T Ashley Harrell   6/1/2022 11:15 AM Ana Kay, [address] [facility]

## 2022-05-03 ENCOUNTER — MED REC SCAN ONLY (OUTPATIENT)
Dept: FAMILY MEDICINE CLINIC | Facility: CLINIC | Age: 67
End: 2022-05-03

## 2022-05-04 ENCOUNTER — OFFICE VISIT (OUTPATIENT)
Dept: CARDIOLOGY | Age: 67
End: 2022-05-04

## 2022-05-04 ENCOUNTER — OFFICE VISIT (OUTPATIENT)
Dept: PHYSICAL THERAPY | Age: 67
End: 2022-05-04
Attending: FAMILY MEDICINE
Payer: MEDICARE

## 2022-05-04 VITALS
HEIGHT: 69 IN | SYSTOLIC BLOOD PRESSURE: 137 MMHG | WEIGHT: 186 LBS | HEART RATE: 67 BPM | BODY MASS INDEX: 27.55 KG/M2 | DIASTOLIC BLOOD PRESSURE: 72 MMHG

## 2022-05-04 DIAGNOSIS — Z95.5 PRESENCE OF CORONARY ANGIOPLASTY IMPLANT AND GRAFT: ICD-10-CM

## 2022-05-04 DIAGNOSIS — R07.2 CHEST PAIN, PRECORDIAL: ICD-10-CM

## 2022-05-04 DIAGNOSIS — E78.00 PURE HYPERCHOLESTEROLEMIA: ICD-10-CM

## 2022-05-04 DIAGNOSIS — R94.39 ABNORMAL CARDIOVASCULAR STRESS TEST: Primary | ICD-10-CM

## 2022-05-04 DIAGNOSIS — I10 ESSENTIAL (PRIMARY) HYPERTENSION: ICD-10-CM

## 2022-05-04 DIAGNOSIS — I25.10 ATHEROSCLEROSIS OF NATIVE CORONARY ARTERY OF NATIVE HEART WITHOUT ANGINA PECTORIS: ICD-10-CM

## 2022-05-04 PROCEDURE — 97110 THERAPEUTIC EXERCISES: CPT

## 2022-05-04 PROCEDURE — 99214 OFFICE O/P EST MOD 30 MIN: CPT | Performed by: INTERNAL MEDICINE

## 2022-05-04 RX ORDER — VIT C/B6/B5/MAGNESIUM/HERB 173 50-5-6-5MG
CAPSULE ORAL
COMMUNITY
End: 2023-05-18 | Stop reason: ALTCHOICE

## 2022-05-04 SDOH — HEALTH STABILITY: PHYSICAL HEALTH: ON AVERAGE, HOW MANY DAYS PER WEEK DO YOU ENGAGE IN MODERATE TO STRENUOUS EXERCISE (LIKE A BRISK WALK)?: 6 DAYS

## 2022-05-04 NOTE — PROGRESS NOTES
Progress Summary  Pt has attended 15 visits in Physical Therapy. Dx: Right sciatic nerve pain (M54.31)  Hip pain (M25.559)  Retrolisthesis of vertebrae (M43.10)  Chronic right-sided low back pain with right-sided sciatica (M54.41,G89.29)      Acute pain of right shoulder (M25.511)  Acute pain of right shoulder (M25.512)             Insurance (Authorized # of Visits):  Medicare/Supp         Authorizing Physician: Dr. Debbie Dupont  Next MD visit: none scheduled    Fall Risk: standard         Precautions: n/a             Subjective:   Better, doing home program about 50% of what therapist asked for. R shoulder still has times it is weak with raising dinner plates up to the shelf and painful in either arm with reaching with housework, but much better than before. Pain 4/10 R shoulder at its worst and 3-4/10 in L shoulder. R hand pain about 6/10 at its worst with grabbing objects. Lower Extremity is not painful, but R hip stiff  with getting into or out of cars, or walking in the woods. Objective:   Tested 5/4/2022:  Upper extremity:   Abduction: intact  Horizontal abduction: 43* R and 41* L  Hand behind back: L2 R* and T7 L  Horizontal adduction: intact  Extension:  intact    Special Tests:  Make a fist squeeze* (R hand)  Pull wrist int flexion extension with other hand*  5lbs ankle weight scaption R arm: intact  10lbs ankle weight scaption: L arm* full range, R arm mod/severe loss*  Big step R leg*  Gait: R buttock*  Stand and lean side to side*: buttock            Tested 4/5/2022:  Prone shoulder Manual Muscle Tests:  Rhomboid: 3+/5* left and 3+/5 R*  Middle Trap: 5/5 left and 4-/5 R*  Lower Trap: 4/5 L and 3-/5 R*   Lats: 5/5 left and 4/5 R        Assessment: Patient has met all goals and ready to discharge to home program.  Cervical extension stretching continues to improve ability to reach overhead with objects in hand, which will improve ability to put away dishes at home.      Goals: (to be met in 21 visits)   Pt will be independent with home program to allow for maintenance of goals achieved in therapy. Lower Extremity Goals:  Pt will improve Lower extremity functional scale (LEFS) score from 62/80 to 31/80 to display improved ability to stand to wash dishes (Goal met 3/1/2022)  Pt will reduce pain at its worst from 8.5/10 to 5.5/10 to allow for improved ability to sleep (Goal met 3/24/2022)  Pt will plantar flexion strength from 3-/5 on the right to equal bilaterally to allow for improved ability to ambulate to do shopping (Goal met 3/1/2022)    R Shoulder Goals:  Pt will improve Quick Dash 9 score from 60.5/100 to 40.5/100 to display improved ability to perform housework with his right arm. (Goal met, score 16.5 on 5/4/2022)  Pt will reduce pain at its worst from 9/10 to 6/10 to allow for improved ability to sleep with less pain in his right arm (Goal met 4/5/2022)  Pt will improve shoulder abduction AROM from roughly 30 degrees to about 150 degrees to allow improved ability to reach overhead into shelves. (Goal met 3/23/2022)    L Shoulder Goals (Started 3/23/2022): Pt will improve Quick Dash 9 score from 38.5/100 to 28.5/100 to display improved ability to perform housework with his right arm. (Goal met, score 16.5 on 5/4/2022)  Pt will reduce pain at its worst from 7/10 to 4/10 to allow for improved ability to reach with his left arm  (Goal met on 5/4/2022)  Pt will improve shoulder abduction AROM from 78 degrees to 170 degrees to allow improved ability to reach overhead into shelves. (Goal met on 4/5/2022)        Plan: Continue home program    Patient/Family/Caregiver was advised of these findings, precautions, and treatment options and has agreed to actively participate in planning and for this course of care. Thank you for your referral. If you have any questions, please contact me at Dept: [phone].     Sincerely,  Electronically signed by therapist: Ekaterina Rich PT     Physician's certification required:  No  Please co-sign or sign and return this letter via fax as soon as possible to [phone]. I certify the need for these services furnished under this plan of treatment and while under my care. X___________________________________________________ Date____________________    Certification From: 8/4/8555  To:8/2/2022       Additional ideas:  Hold accountable to home program  See before and after home program    Prone thoracic/cervical stretch with strap overpressure: (his home program) see effect  See again with cervical extension component    Future: If no better sitting thoracic extension    R shoulder:  Extension in external rotation bias  Disfunction to horizontal abduction stretching with AC joint glides by therapist (Disfunction to horizontal abduction stretching)      R shoulder future repeated movements to check:  Flexion   ER in 90 degrees flexion    L shoulder:  Exhaust extension stretching  Disfunction to horizontal abduction stretching with AC joint glides by therapist (Disfunction to horizontal abduction stretching)    Future L shoulder:  Exhaust/explore other movements to his left shoulder. Other:  (How does he sleep?)    AC joint glides and posterior shoulder glides     Progress scapular strength    Progress shoulder strength, use machines here and review home program.        Previous response to loading:  Prone cervical/retraction/extension with thoracic spine extension stretch with strap overpressure 2 minutes (3 sets done): better after (Note: no pain R hand pain during):  After better abduction AROM is full (3/29/2022)    Prone cervical retraction/extension  3 minutes: after a bit better (Note: no R hand pain during)(3/29/2022)      Supine on foam roll thoracic stretch with therapist overpressure x20: maybe a bit better (3/29/2022)        Cervical supine retraction/extension 1-15x3: better shoulder abduction ROM (3/7/2022)      Thoracic extension stretching sustained supine 3 minutes: no effect (3/1/2022)            R shoulder extension with external rotation bias x20: a bit better horizontal abduction AROM. (3/29/2022)    Hand behind back R shoulder x20: produce, no worse After a bit better. (3/29/2022)    Horizontal abduction stretching x10: produce, no worse (3/17/2022)    Shoulder external rotation 15x:  (3/4/2022)    Shoulder horizontal adduction x15 (3/4/2022)    Shoulder abduction with cane overpressure x15 (3/4/2022)    AC joint glides by therapist 3 minutes: maybe a bit better (2/25/2022)    Shoulder extension with pole overpressure x10: no effect (2/23/2022)    Hand behind back x10: no effect (2/23/2022)            L shoulder extension stretching x20: produce, no worse. After a bit better. (3/29/2022)                    Charges: 3 there ex,   Total Timed Treatment: 50 min  Total Treatment Time: 50 min  3/17/2022  Tx#: 10 3/23/2022  Tx#: 11 3/29/2022  Tx: 12 4/5/2022  Tx: 13 4/27/2022  Tx#: 14 5/4/2022  Tx#: 15      There ex:  Prone cervical retraction/extension   3 minutes: no effect    Horizontal abduction stretching x10: produce, no worse    Hand behind back stretching x10: abolish pain to internal rotation stretching, no change to horizontal abduction stretching. Hands on wall for scapula clock (up, down, together, and forward) 5x    Scapular retraction with band for external rotation 20x    Dips in chair 10-20x2    Row 18lbs x20    TRX push ups, pull ups, triceps pull, pec fly 20x (cues for push up plus with TRX)    Educated on home program, see below. Verbal, visual and tactile cues for there ex.    There ex:  Prone cervical retraction/extension   3 minutes: better (2 sets done)    Hand behind back x10: a little better after    TRX push ups, 10-20x2 (cues for push up plus with TRX)    Dips in chair 20x    Scapular retraction with band for external rotation 20x    Motivational interviewing to increase adherence    Re-assessed progress    Educated on home program, see below. Verbal, visual and tactile cues for there ex. There ex:  Prone cervical retraction/extension  3 minutes: after a bit better (Note: no R hand pain during)    Prone cervical/retraction/extension with thoracic spine extension stretch with strap overpressure 2 minutes (3 sets done): better after (Note: no pain R hand pain during): After better abduction AROM is full    Supine on foam roll thoracic stretch with therapist overpressure x20: maybe a bit better    R shoulder extension with external rotation bias x20: a bit better horizontal abduction AROM. TRX push up 20x, triceps extension 20x, rows 20x2, chest fly 10x (chest produced R hand pain, discontinued)    Hand behind back R shoulder x20: produce, no worse After a bit better. L shoulder extension stretching x20: produce, no worse. After a bit better. Row cable machine 16lbs 20x2    Green band flexion loop band with scapular contraction 20x    Dips x5: produce R hand pain, discontinued    Shoulder retraction with external rotation and green band x20      Educated on home program, see below. Verbal, visual and tactile cues for there ex. There ex:  Prone cervical/retraction/extension with thoracic spine extension stretch with strap overpressure 3 minutes: After horizontal adduction better on R arm and prone manual muscles tests better on R arm (2 sets done)    Hand behind back x20 with strap: R arm more improved, better. (After both L arm somewhat better) (2 sets done)    Dip x5: produce hand pain, discontinued. Row cable machine 16lbs 20x2    Therband blue horizontal abduction with elbows bent and straight, flexion, extension, external rotation x20 (2 sets)    Motivational interveiwing to increase adherence    Discussed plan of care. Educated on home program, see below. Verbal, visual and tactile cues for there ex.    There ex:  Sitting thoracic extension with ball behind upper back x20: no better (during gets more thoracic ROM, not done with cervical retraction/extension stretching)    Prone cervical/retraction/extension with thoracic spine extension stretch with strap overpressure 3 minutes: After horizontal adduction better bilaterally, and a bit more strength in arm. (Done on elbows prone up on forearms (2 sets done) (R leg pain abolished as well, briefly held as lumbar stretch initially)    Hand behind back with strap x15 (bilaterally): better L shoulder abduction lifting but L shoulder horizontal abduction maybe a bit worse, 2nd set on L shoulder therapist overpressure: better L shoulder (No effect R shoulder) (3rd set L shoulder)    Sleeper stretch x20-30 (2 sets): better first set    Loop band 10-20 (2 sets) red/blue: external rotation, horizontal abduction (elbows bent and straight), bow and arrow, shoulder extension (no hand pain with this, done with wrists)        Educated on home program, see below. Verbal, visual and tactile cues for there ex. There ex:    Prone cervical/retraction/extension with thoracic spine extension stretch overpressure 3 minutes and sleeper stretch on side R arm x40: after somewhat better. Mobilization/overpressure in prone for extension/retraction of spine and then 2 minutes Prone cervical/retraction/extension with thoracic spine extension stretch: better (4 more sets done 30' for form)    Retraction prone 10x2    Pec minor stretch on wall 30'x2    Cobra 5-12x5: better each time, more better with pillows under calfs and with hands on step. (said hand is okay during this)    Discussed plan of care and discharge plan. Educated on home program, see below. Verbal, visual and tactile cues for there ex.         Manual 15 mintues   Posterior and shoulder  inferior glides        Manual 15 mintues   Posterior and inferior glides and Cervical/thoracic mobilization       Manual 15 mintues   Shoulder posterior and inferior glides and Cervical/thoracic mobilization   Manual 15 mintues Shoulder posterior and inferior glides and Cervical/thoracic mobilization Manual 15 mintues   Shoulder posterior and inferior as well as anterior glides and Cervical/thoracic mobilization

## 2022-05-18 ENCOUNTER — APPOINTMENT (OUTPATIENT)
Dept: PHYSICAL THERAPY | Age: 67
End: 2022-05-18
Attending: FAMILY MEDICINE
Payer: MEDICARE

## 2022-06-01 ENCOUNTER — APPOINTMENT (OUTPATIENT)
Dept: PHYSICAL THERAPY | Age: 67
End: 2022-06-01
Attending: FAMILY MEDICINE
Payer: MEDICARE

## 2022-06-03 ENCOUNTER — TELEPHONE (OUTPATIENT)
Dept: ORTHOPEDICS CLINIC | Facility: CLINIC | Age: 67
End: 2022-06-03

## 2022-06-03 NOTE — TELEPHONE ENCOUNTER
Last Imaging  Exam Date: 02/15/2022  Procedure: XR HIP W OR WO PELVIS 2 OR 3 VIEWS, RIGHT (CPT=73502)  Interpretation   PROCEDURE:  XR HIP W OR WO PELVIS 2 OR 3 VIEWS, RIGHT ( CPT=73502)     TECHNIQUE:  Unilateral 2 to 3 views of the hip and pelvis if performed. COMPARISON:  None. INDICATIONS:  M25.559 Hip pain     PATIENT STATED HISTORY: (As transcribed by Technologist)  The patient states that he has pain to the right hip for three weeks, but did not have an injury. FINDINGS:    No acute fractures or osseous lesions are identified. Femoral acetabular relationships are within normal limits. Osteoarthritic changes, right greater than left. Calcified phleboliths within the pelvis.  =====  CONCLUSION:  No acute fractures. Osteoarthritic changes, right greater than left.      Dictated by (CST): Leanne Mccabe MD on 2/15/2022 at 1:01 PM

## 2022-06-07 ENCOUNTER — MED REC SCAN ONLY (OUTPATIENT)
Dept: FAMILY MEDICINE CLINIC | Facility: CLINIC | Age: 67
End: 2022-06-07

## 2022-06-10 ENCOUNTER — NURSE ONLY (OUTPATIENT)
Dept: FAMILY MEDICINE CLINIC | Facility: CLINIC | Age: 67
End: 2022-06-10
Payer: MEDICARE

## 2022-06-10 DIAGNOSIS — D64.9 ANEMIA, UNSPECIFIED TYPE: ICD-10-CM

## 2022-06-10 LAB
BASOPHILS # BLD AUTO: 0.08 X10(3) UL (ref 0–0.2)
BASOPHILS NFR BLD AUTO: 1.1 %
EOSINOPHIL # BLD AUTO: 0.12 X10(3) UL (ref 0–0.7)
EOSINOPHIL NFR BLD AUTO: 1.7 %
ERYTHROCYTE [DISTWIDTH] IN BLOOD BY AUTOMATED COUNT: 17.3 %
HCT VFR BLD AUTO: 44.4 %
HGB BLD-MCNC: 14.2 G/DL
IMM GRANULOCYTES # BLD AUTO: 0.03 X10(3) UL (ref 0–1)
IMM GRANULOCYTES NFR BLD: 0.4 %
IRON SATN MFR SERPL: 20 %
IRON SERPL-MCNC: 68 UG/DL
LYMPHOCYTES # BLD AUTO: 1.78 X10(3) UL (ref 1–4)
LYMPHOCYTES NFR BLD AUTO: 24.7 %
MCH RBC QN AUTO: 30.3 PG (ref 26–34)
MCHC RBC AUTO-ENTMCNC: 32 G/DL (ref 31–37)
MCV RBC AUTO: 94.7 FL
MONOCYTES # BLD AUTO: 0.69 X10(3) UL (ref 0.1–1)
MONOCYTES NFR BLD AUTO: 9.6 %
NEUTROPHILS # BLD AUTO: 4.5 X10 (3) UL (ref 1.5–7.7)
NEUTROPHILS # BLD AUTO: 4.5 X10(3) UL (ref 1.5–7.7)
NEUTROPHILS NFR BLD AUTO: 62.5 %
PLATELET # BLD AUTO: 334 10(3)UL (ref 150–450)
RBC # BLD AUTO: 4.69 X10(6)UL
TIBC SERPL-MCNC: 338 UG/DL (ref 240–450)
TRANSFERRIN SERPL-MCNC: 227 MG/DL (ref 200–360)
WBC # BLD AUTO: 7.2 X10(3) UL (ref 4–11)

## 2022-06-10 PROCEDURE — 85025 COMPLETE CBC W/AUTO DIFF WBC: CPT | Performed by: FAMILY MEDICINE

## 2022-06-10 PROCEDURE — 83540 ASSAY OF IRON: CPT | Performed by: FAMILY MEDICINE

## 2022-06-10 PROCEDURE — 83550 IRON BINDING TEST: CPT | Performed by: FAMILY MEDICINE

## 2022-06-10 NOTE — PROGRESS NOTES
Patient presented for lab work ordered by Iglesia Kimutant. One mint and one lavender tubes drawn with a butterfly needle x one attempt in right ac space. Pt tolerated procedure well.

## 2022-06-15 ENCOUNTER — TELEPHONE (OUTPATIENT)
Dept: ORTHOPEDICS CLINIC | Facility: CLINIC | Age: 67
End: 2022-06-15

## 2022-06-15 DIAGNOSIS — M25.551 RIGHT HIP PAIN: Primary | ICD-10-CM

## 2022-06-16 NOTE — TELEPHONE ENCOUNTER
Pt will need updated xray as his imaging is over 1 months old. Order placed. Please reach out to pt to schedule. Thanks!

## 2022-06-20 ENCOUNTER — OFFICE VISIT (OUTPATIENT)
Dept: ORTHOPEDICS CLINIC | Facility: CLINIC | Age: 67
End: 2022-06-20
Payer: MEDICARE

## 2022-06-20 ENCOUNTER — HOSPITAL ENCOUNTER (OUTPATIENT)
Dept: GENERAL RADIOLOGY | Age: 67
Discharge: HOME | End: 2022-06-20
Attending: PHYSICIAN ASSISTANT
Payer: MEDICARE

## 2022-06-20 VITALS — BODY MASS INDEX: 26.36 KG/M2 | WEIGHT: 178 LBS | HEIGHT: 69 IN

## 2022-06-20 DIAGNOSIS — M25.551 RIGHT HIP PAIN: ICD-10-CM

## 2022-06-20 DIAGNOSIS — M16.11 PRIMARY OSTEOARTHRITIS OF RIGHT HIP: Primary | ICD-10-CM

## 2022-06-20 PROCEDURE — 73502 X-RAY EXAM HIP UNI 2-3 VIEWS: CPT | Performed by: PHYSICIAN ASSISTANT

## 2022-06-20 RX ORDER — DICLOFENAC SODIUM 75 MG/1
75 TABLET, DELAYED RELEASE ORAL 2 TIMES DAILY PRN
Qty: 60 TABLET | Refills: 0 | Status: SHIPPED | OUTPATIENT
Start: 2022-06-20

## 2022-07-20 ENCOUNTER — OFFICE VISIT (OUTPATIENT)
Dept: ORTHOPEDICS CLINIC | Facility: CLINIC | Age: 67
End: 2022-07-20
Payer: MEDICARE

## 2022-07-20 VITALS — SYSTOLIC BLOOD PRESSURE: 124 MMHG | OXYGEN SATURATION: 98 % | HEART RATE: 62 BPM | DIASTOLIC BLOOD PRESSURE: 78 MMHG

## 2022-07-20 DIAGNOSIS — M16.11 PRIMARY OSTEOARTHRITIS OF RIGHT HIP: Primary | ICD-10-CM

## 2022-07-20 PROCEDURE — 20611 DRAIN/INJ JOINT/BURSA W/US: CPT | Performed by: ORTHOPAEDIC SURGERY

## 2022-07-20 RX ORDER — TRIAMCINOLONE ACETONIDE 40 MG/ML
40 INJECTION, SUSPENSION INTRA-ARTICULAR; INTRAMUSCULAR ONCE
Status: COMPLETED | OUTPATIENT
Start: 2022-07-20 | End: 2022-07-20

## 2022-07-20 RX ADMIN — TRIAMCINOLONE ACETONIDE 40 MG: 40 INJECTION, SUSPENSION INTRA-ARTICULAR; INTRAMUSCULAR at 10:40:00

## 2022-07-20 NOTE — PROCEDURES
After informed consent, the patient's right hip was marked, prepped with topical antiseptic, and locally anesthetized with skin refrigerant followed by injection of 1% lidocaine to create a skin wheal anteriorly at an insertion site a few fingerbreadths lateral to the femoral pulse, along the trajectory of the femoral neck. Next, the area was re-prepped with topical antiseptic, and ultrasound guidance was performed to direct a 20 gauge spinal needle into the hip joint at the junction of the femoral head and neck, after which a mixture of 1mL 40mg/mL Kenalog, 2mL 1% lidocaine and 2mL 0.5% marcaine was injected while visualizing the fluid under ultrasound. Confirmatory imaging was saved to the patient's chart. A band-aid was applied. The patient tolerated the procedure well.     Glen Strong MD, [address] [facility]  Phone [phone]  Fax [phone]

## 2022-08-24 ENCOUNTER — PATIENT MESSAGE (OUTPATIENT)
Dept: ORTHOPEDICS CLINIC | Facility: CLINIC | Age: 67
End: 2022-08-24

## 2022-08-24 NOTE — TELEPHONE ENCOUNTER
From: Felisha Segal  To: Halina Burciaga MD  Sent: 8/24/2022 1:59 PM CDT  Subject: cortisone injection    I recently had a cortisone injection in RT hip. Everything was great, but things are starting to stiffen up. I have a big canoe trip ([address] [place] [address], Sept. 10 - 18, lots of portaging) and I was hoping I could schedule another injection prior to trip.   Thanks

## 2022-09-07 NOTE — TELEPHONE ENCOUNTER
I thought he was taking 50 mg, which was the last refill. Please call and confirm dose with pt and what he needs filled.

## 2022-09-07 NOTE — TELEPHONE ENCOUNTER
LOV: 4/26/22   Last Refill:4/26/22     Refill is for 25mg, but it looks like 50mg is on med list as well    Routing to provider

## 2022-09-08 ENCOUNTER — TELEPHONE (OUTPATIENT)
Dept: FAMILY MEDICINE CLINIC | Facility: CLINIC | Age: 67
End: 2022-09-08

## 2022-09-08 NOTE — TELEPHONE ENCOUNTER
Called pt- left message to call back and confirm what dose he needs of the sertraline- 50 or 25 Abnormal uterine bleeding  07/09/2018    Active  Jackie Reddy

## 2022-09-08 NOTE — TELEPHONE ENCOUNTER
PATIENT CALLING BACK. PATIENT INFORMS HE IS ON SERTRALINE 75MG. THAT IS WHY HE HAS THE 50MG AND THE 25MG. PATIENT STATES THAT HE HASN'T BEEN TAKING THIS MEDICATION LIKE HE SHOULD AND STILL HAS ENOUGH MEDICATION. HE SAYS NO REFILL NEEDED RIGHT NOW.

## 2022-09-12 RX ORDER — SERTRALINE HYDROCHLORIDE 25 MG/1
TABLET, FILM COATED ORAL
Qty: 90 TABLET | Refills: 0 | OUTPATIENT
Start: 2022-09-12

## 2022-09-20 RX ORDER — SERTRALINE HYDROCHLORIDE 25 MG/1
TABLET, FILM COATED ORAL
Qty: 90 TABLET | Refills: 0 | Status: SHIPPED | OUTPATIENT
Start: 2022-09-20

## 2022-10-05 ENCOUNTER — TELEPHONE (OUTPATIENT)
Dept: ORTHOPEDICS CLINIC | Facility: CLINIC | Age: 67
End: 2022-10-05

## 2022-10-05 DIAGNOSIS — M79.671 RIGHT FOOT PAIN: Primary | ICD-10-CM

## 2022-10-05 NOTE — TELEPHONE ENCOUNTER
NPT scheduled with Dr. Juan Whittaker for 2 Ganglion Cysts on the RT Foot. Patient does not have any recent imaging in epic. Future Appointments   Date Time Provider Jen Stratton   11/16/2022  1:00 PM Arlette Reyes., DPM [facility]     Please advise if patient will need imaging prior to appt.  Thank you

## 2022-10-05 NOTE — TELEPHONE ENCOUNTER
Reviewed patients chart, xray orders are required. Order placed for rt foot xrays.  Please contact patient advise to arrive 30 mins prior to patients appt to complete x-ray order and schedule patients xray appt-Thank youappropriate

## 2022-11-16 ENCOUNTER — HOSPITAL ENCOUNTER (OUTPATIENT)
Dept: GENERAL RADIOLOGY | Age: 67
Discharge: HOME | End: 2022-11-16
Attending: PODIATRIST
Payer: MEDICARE

## 2022-11-16 ENCOUNTER — APPOINTMENT (OUTPATIENT)
Dept: CARDIOLOGY | Age: 67
End: 2022-11-16

## 2022-11-16 ENCOUNTER — OFFICE VISIT (OUTPATIENT)
Dept: ORTHOPEDICS CLINIC | Facility: CLINIC | Age: 67
End: 2022-11-16
Payer: MEDICARE

## 2022-11-16 VITALS — HEIGHT: 69 IN | WEIGHT: 180 LBS | BODY MASS INDEX: 26.66 KG/M2

## 2022-11-16 DIAGNOSIS — M79.674 PAIN OF TOE OF RIGHT FOOT: ICD-10-CM

## 2022-11-16 DIAGNOSIS — M79.671 RIGHT FOOT PAIN: ICD-10-CM

## 2022-11-16 DIAGNOSIS — M67.40 MUCOID CYST OF JOINT: Primary | ICD-10-CM

## 2022-11-16 PROCEDURE — 1126F AMNT PAIN NOTED NONE PRSNT: CPT | Performed by: PODIATRIST

## 2022-11-16 PROCEDURE — 99204 OFFICE O/P NEW MOD 45 MIN: CPT | Performed by: PODIATRIST

## 2022-11-16 PROCEDURE — 73630 X-RAY EXAM OF FOOT: CPT | Performed by: PODIATRIST

## 2022-11-21 ENCOUNTER — APPOINTMENT (OUTPATIENT)
Dept: CARDIOLOGY | Age: 67
End: 2022-11-21

## 2022-11-23 ENCOUNTER — OFFICE VISIT (OUTPATIENT)
Dept: ORTHOPEDICS CLINIC | Facility: CLINIC | Age: 67
End: 2022-11-23
Payer: MEDICARE

## 2022-11-23 ENCOUNTER — TELEPHONE (OUTPATIENT)
Dept: ORTHOPEDICS CLINIC | Facility: CLINIC | Age: 67
End: 2022-11-23

## 2022-11-23 ENCOUNTER — OFFICE VISIT (OUTPATIENT)
Dept: CARDIOLOGY | Age: 67
End: 2022-11-23

## 2022-11-23 VITALS — HEIGHT: 69 IN | BODY MASS INDEX: 26.66 KG/M2 | WEIGHT: 180 LBS

## 2022-11-23 VITALS
HEART RATE: 73 BPM | HEIGHT: 69 IN | SYSTOLIC BLOOD PRESSURE: 111 MMHG | BODY MASS INDEX: 27.55 KG/M2 | DIASTOLIC BLOOD PRESSURE: 70 MMHG | WEIGHT: 186 LBS

## 2022-11-23 DIAGNOSIS — E78.00 PURE HYPERCHOLESTEROLEMIA: ICD-10-CM

## 2022-11-23 DIAGNOSIS — M67.40 MUCOID CYST OF JOINT: ICD-10-CM

## 2022-11-23 DIAGNOSIS — R07.2 CHEST PAIN, PRECORDIAL: ICD-10-CM

## 2022-11-23 DIAGNOSIS — Z95.5 PRESENCE OF CORONARY ANGIOPLASTY IMPLANT AND GRAFT: ICD-10-CM

## 2022-11-23 DIAGNOSIS — R94.39 ABNORMAL CARDIOVASCULAR STRESS TEST: Primary | ICD-10-CM

## 2022-11-23 DIAGNOSIS — M79.674 PAIN OF TOE OF RIGHT FOOT: Primary | ICD-10-CM

## 2022-11-23 DIAGNOSIS — I25.10 ATHEROSCLEROSIS OF NATIVE CORONARY ARTERY OF NATIVE HEART WITHOUT ANGINA PECTORIS: ICD-10-CM

## 2022-11-23 DIAGNOSIS — I10 ESSENTIAL (PRIMARY) HYPERTENSION: ICD-10-CM

## 2022-11-23 PROCEDURE — 88304 TISSUE EXAM BY PATHOLOGIST: CPT | Performed by: PODIATRIST

## 2022-11-23 PROCEDURE — 99214 OFFICE O/P EST MOD 30 MIN: CPT | Performed by: INTERNAL MEDICINE

## 2022-11-23 RX ORDER — ATORVASTATIN CALCIUM 20 MG/1
20 TABLET, FILM COATED ORAL DAILY
Qty: 90 TABLET | Refills: 3 | Status: SHIPPED | OUTPATIENT
Start: 2022-11-23

## 2022-11-23 SDOH — HEALTH STABILITY: PHYSICAL HEALTH: ON AVERAGE, HOW MANY DAYS PER WEEK DO YOU ENGAGE IN MODERATE TO STRENUOUS EXERCISE (LIKE A BRISK WALK)?: 0 DAYS

## 2022-11-23 SDOH — HEALTH STABILITY: PHYSICAL HEALTH: ON AVERAGE, HOW MANY MINUTES DO YOU ENGAGE IN EXERCISE AT THIS LEVEL?: 0 MIN

## 2022-11-23 ASSESSMENT — PATIENT HEALTH QUESTIONNAIRE - PHQ9
CLINICAL INTERPRETATION OF PHQ2 SCORE: NO FURTHER SCREENING NEEDED
2. FEELING DOWN, DEPRESSED OR HOPELESS: NOT AT ALL
SUM OF ALL RESPONSES TO PHQ9 QUESTIONS 1 AND 2: 0
1. LITTLE INTEREST OR PLEASURE IN DOING THINGS: NOT AT ALL
SUM OF ALL RESPONSES TO PHQ9 QUESTIONS 1 AND 2: 0

## 2022-11-23 NOTE — PROGRESS NOTES
[facility] Progress Note    Subjective:     Mk Gaona is here to go ahead with the excision of the mucoid cysts first and second digits of the right foot as planned  Discussed the procedure and the post procedure plan at the last visit. Also discussed the high recurrence rate. Objective:     Exam basically unchanged right foot mucoid cyst at the dorsal medial aspect of the IP joint right great toe about 0.5 cm in diameter  Second digit at the DIP joint level dorsally is a similar mucoid cyst measuring about . 5 cm in diameter    Feet are well-perfused and he has full sensation. No infection. Assessment/Plan:     Diagnoses and all orders for this visit:    Pain of toe of right foot  -     SURGICAL PATHOLOGY TISSUE; Future  -     EXC SKIN BENIG <5MM REMAINDR BODY    Mucoid cyst of joint  -     SURGICAL PATHOLOGY TISSUE; Future  -     EXC SKIN BENIG <5MM REMAINDR BODY        We proceeded with the excision today    First and second digits right foot prepped sterilely after injection of 2% lidocaine for anesthesia of the first and second digits. Small turnicot's were placed around the base of the first and second digit. The first digit was addressed first, 2 converging semielliptical incisions were made overlying the cyst and the wedge of skin and cyst was excised down to the level of the IP joint this was placed in the specimen cup. Undermining of the skin and closure of the skin with 4-0 nylon followed. Next the second digit was addressed at the DIP joint level. 2 converging semielliptical incisions made to the level of the joint the wedge was excised and sent to pathology in the specimen cup. Skin was undermined and the 4-0 nylon was used to close the skin. Sterile dressing applied. Patient tolerated the procedure and the local anesthesia well. Follow-up for suture removal in 2 weeks.   Continue a lot of compression and again we talked about the high recurrence rate of these particular cysts            [employer] Player. Harika Ba DPM  [facility]    [employer] speech recognition software was used to prepare this note. If a word or phrase is confusing, it is likely do to a failure of recognition. Please contact me with any questions or clarifications.

## 2022-11-23 NOTE — TELEPHONE ENCOUNTER
L/m for patient to let him know Dr. Shamar Mendoza said: once the bandage needs to be changed it needs to be put on snug, not loose or flimsy

## 2022-11-29 ENCOUNTER — MED REC SCAN ONLY (OUTPATIENT)
Dept: FAMILY MEDICINE CLINIC | Facility: CLINIC | Age: 67
End: 2022-11-29

## 2022-12-07 ENCOUNTER — EXTERNAL LAB (OUTPATIENT)
Dept: OTHER | Age: 67
End: 2022-12-07

## 2022-12-07 ENCOUNTER — NURSE ONLY (OUTPATIENT)
Dept: FAMILY MEDICINE CLINIC | Facility: CLINIC | Age: 67
End: 2022-12-07
Payer: MEDICARE

## 2022-12-07 ENCOUNTER — OFFICE VISIT (OUTPATIENT)
Dept: ORTHOPEDICS CLINIC | Facility: CLINIC | Age: 67
End: 2022-12-07
Payer: MEDICARE

## 2022-12-07 VITALS — BODY MASS INDEX: 26.66 KG/M2 | HEIGHT: 69 IN | WEIGHT: 180 LBS

## 2022-12-07 DIAGNOSIS — R94.39 ABNORMAL CARDIOVASCULAR STRESS TEST: Primary | ICD-10-CM

## 2022-12-07 DIAGNOSIS — M67.40 MUCOID CYST OF JOINT: Primary | ICD-10-CM

## 2022-12-07 LAB
ALBUMIN SERPL-MCNC: 4 G/DL (ref 3.4–5)
ALBUMIN SERPL-MCNC: 4 G/DL (ref 3.4–5)
ALBUMIN/GLOB SERPL: 1.3 {RATIO} (ref 1–2)
ALBUMIN/GLOB SERPL: 1.3 {RATIO} (ref 1–2)
ALP LIVER SERPL-CCNC: 108 U/L
ALP SERPL-CCNC: 108 U/L (ref 45–117)
ALT SERPL-CCNC: 66 U/L
ALT SERPL-CCNC: 66 U/L (ref 16–61)
ANION GAP SERPL CALC-SCNC: 6 MMOL/L (ref 0–18)
ANION GAP SERPL CALC-SCNC: 6 MMOL/L (ref 0–18)
AST SERPL-CCNC: 33 U/L (ref 15–37)
AST SERPL-CCNC: 33 U/L (ref 15–37)
BILIRUB SERPL-MCNC: 1 MG/DL (ref 0.1–2)
BILIRUB SERPL-MCNC: 1 MG/DL (ref 0.1–2)
BUN BLD-MCNC: 16 MG/DL (ref 7–18)
BUN SERPL-MCNC: 16 MG/DL (ref 7–18)
CALCIUM BLD-MCNC: 9 MG/DL (ref 8.5–10.1)
CALCIUM SERPL-MCNC: 9 MG/DL (ref 8.5–10.1)
CALCULATED OSMO: 293 MOSM/KG (ref 275–295)
CHLORIDE SERPL-SCNC: 107 MMOL/L (ref 98–112)
CHLORIDE SERPL-SCNC: 107 MMOL/L (ref 98–112)
CHOLEST SERPL-MCNC: 148 MG/DL
CHOLEST SERPL-MCNC: 148 MG/DL (ref ?–200)
CO2 SERPL-SCNC: 28 MMOL/L (ref 21–32)
CO2 SERPL-SCNC: 28 MMOL/L (ref 21–32)
CREAT BLD-MCNC: 1.03 MG/DL
CREAT SERPL-MCNC: 1.03 MG/DL (ref 0.7–1.3)
ERYTHROCYTE [DISTWIDTH] IN BLOOD BY AUTOMATED COUNT: 14.4 %
ERYTHROCYTE [DISTWIDTH] IN BLOOD: 14.4 %
FASTING PATIENT LIPID ANSWER: YES
FASTING STATUS PATIENT QL REPORTED: YES
GFR SERPLBLD BASED ON 1.73 SQ M-ARVRAT: 80 ML/MIN/1.73M2 (ref 60–?)
GFR SERPLBLD SCHWARTZ-ARVRAT: 80 ML/MIN/1.73M2
GLOBULIN PLAS-MCNC: 3.1 G/DL (ref 2.8–4.4)
GLOBULIN SER-MCNC: 3.1 G/DL (ref 2.8–4.4)
GLUCOSE BLD-MCNC: 94 MG/DL (ref 70–99)
GLUCOSE SERPL-MCNC: 94 MG/DL (ref 70–99)
HCT VFR BLD AUTO: 44.7 %
HCT VFR BLD CALC: 44.7 % (ref 39–53)
HDLC SERPL-MCNC: 47 MG/DL (ref 40–59)
HDLC SERPL-MCNC: 47 MG/DL (ref 40–59)
HGB BLD-MCNC: 14.9 G/DL
HGB BLD-MCNC: 14.9 G/DL (ref 13–17.5)
LDLC SERPL CALC-MCNC: 82 MG/DL
LDLC SERPL CALC-MCNC: 82 MG/DL (ref ?–100)
LENGTH OF FAST TIME PATIENT: YES H
LENGTH OF FAST TIME PATIENT: YES H
MCH RBC QN AUTO: 33.5 PG (ref 26–34)
MCH RBC QN AUTO: 33.5 PG (ref 26–34)
MCHC RBC AUTO-ENTMCNC: 33.3 G/DL (ref 31–37)
MCHC RBC AUTO-ENTMCNC: 33.3 G/DL (ref 31–37)
MCV RBC AUTO: 100.4 FL
MCV RBC AUTO: 100.4 FL (ref 80–100)
NONHDLC SERPL-MCNC: 101 MG/DL
NONHDLC SERPL-MCNC: 101 MG/DL (ref ?–130)
OSMOLALITY SERPL CALC.SUM OF ELEC: 293 MOSM/KG (ref 275–295)
PLATELET # BLD AUTO: 349 10(3)UL (ref 150–450)
PLATELET # BLD: 349 X10(3) UL (ref 150–450)
POTASSIUM SERPL-SCNC: 4.3 MMOL/L (ref 3.5–5.1)
POTASSIUM SERPL-SCNC: 4.3 MMOL/L (ref 3.5–5.1)
PROT SERPL-MCNC: 7.1 G/DL (ref 6.4–8.2)
PROT SERPL-MCNC: 7.1 G/DL (ref 6.4–8.2)
RBC # BLD AUTO: 4.45 X10(6)UL
RBC # BLD: 4.45 X10(6)UL (ref 3.8–5.8)
SODIUM SERPL-SCNC: 141 MMOL/L (ref 136–145)
SODIUM SERPL-SCNC: 141 MMOL/L (ref 136–145)
TRIGL SERPL-MCNC: 100 MG/DL (ref 30–149)
TRIGL SERPL-MCNC: 100 MG/DL (ref 30–149)
VLDLC SERPL CALC-MCNC: 16 MG/DL (ref 0–30)
VLDLC SERPL CALC-MCNC: 16 MG/DL (ref 0–30)
WBC # BLD AUTO: 8.7 X10(3) UL (ref 4–11)
WBC # BLD: 8.7 X10(3) UL (ref 4–11)

## 2022-12-07 PROCEDURE — 99024 POSTOP FOLLOW-UP VISIT: CPT | Performed by: PODIATRIST

## 2022-12-07 PROCEDURE — 1126F AMNT PAIN NOTED NONE PRSNT: CPT | Performed by: PODIATRIST

## 2022-12-07 PROCEDURE — 85027 COMPLETE CBC AUTOMATED: CPT | Performed by: FAMILY MEDICINE

## 2022-12-07 PROCEDURE — 80053 COMPREHEN METABOLIC PANEL: CPT | Performed by: FAMILY MEDICINE

## 2022-12-07 PROCEDURE — 80061 LIPID PANEL: CPT | Performed by: FAMILY MEDICINE

## 2022-12-07 NOTE — PROGRESS NOTES
[facility] Progress Note    Subjective:     Jassi Broussard is here for follow-up a little over 2 weeks post excision of mucoid cyst from the first digit and second digit both right foot. He has done well over the last 2 weeks and has had very little discomfort. Objective:     Sutures are intact there is a little mild maceration on the second digit right foot but very minimal and no signs of infection no swelling. Healing well                  Assessment/Plan:     Diagnoses and all orders for this visit:    Mucoid cyst of joint        Sutures removed without incident. Continue compressive dressing for 1 more week to discourage recurrence and follow-up is as needed. Again high recurrence rate with these cysts so hopefully this takes            Jerad Bose. Joe Cha DPM  [facility]    [employer] speech recognition software was used to prepare this note. If a word or phrase is confusing, it is likely do to a failure of recognition. Please contact me with any questions or clarifications.

## 2022-12-07 NOTE — PATIENT INSTRUCTIONS
Patient is here for lab draw per HCP   Patient venipuncture on left AC with 23G needle collected a gold top and lavender   Patient left in stable condition with no concerns

## 2023-01-03 RX ORDER — METOPROLOL SUCCINATE 25 MG/1
25 TABLET, EXTENDED RELEASE ORAL DAILY
Qty: 90 TABLET | Refills: 1 | Status: SHIPPED | OUTPATIENT
Start: 2023-01-03

## 2023-01-03 NOTE — TELEPHONE ENCOUNTER
Pt failed refill protocol for the following reasons:    Hypertension Medications Protocol Failed 01/02/2023 02:47 PM   Protocol Details  Appointment in past 6 or next 3 months         Last refill: 4/22/2021 #90 with 3 refills  Last appt: 4/26/2022  Next appt: Future Appointments   Date Time Provider Jen Stratton   5/8/2023 10:00 AM [facility]         Forward to Dr. Surya Pillai, please advise on refills. Thank you.

## 2023-02-22 ENCOUNTER — TELEPHONE (OUTPATIENT)
Dept: FAMILY MEDICINE CLINIC | Facility: CLINIC | Age: 68
End: 2023-02-22

## 2023-02-22 NOTE — TELEPHONE ENCOUNTER
Routing to provider- pt had labs in December, will heneed fasting labs at his [facility] in April?     Future Appointments   Date Time Provider Jen Stratton   4/6/2023 10:00 AM María Harry DO [facility]   5/8/2023 10:00 AM  [facility] [address] [facility] Maria C Bradley

## 2023-02-22 NOTE — TELEPHONE ENCOUNTER
Patient is scheduled for MAW on 4/6. Please let him know if he needs fasting labs at that time so he is prepared.

## 2023-04-06 ENCOUNTER — OFFICE VISIT (OUTPATIENT)
Dept: FAMILY MEDICINE CLINIC | Facility: CLINIC | Age: 68
End: 2023-04-06
Payer: MEDICARE

## 2023-04-06 VITALS
RESPIRATION RATE: 16 BRPM | BODY MASS INDEX: 27.85 KG/M2 | OXYGEN SATURATION: 98 % | HEART RATE: 71 BPM | TEMPERATURE: 98 F | DIASTOLIC BLOOD PRESSURE: 80 MMHG | SYSTOLIC BLOOD PRESSURE: 130 MMHG | WEIGHT: 188 LBS | HEIGHT: 69 IN

## 2023-04-06 DIAGNOSIS — D64.9 ANEMIA, UNSPECIFIED TYPE: ICD-10-CM

## 2023-04-06 DIAGNOSIS — Z00.00 ENCOUNTER FOR ANNUAL HEALTH EXAMINATION: Primary | ICD-10-CM

## 2023-04-06 DIAGNOSIS — I10 ESSENTIAL HYPERTENSION: ICD-10-CM

## 2023-04-06 DIAGNOSIS — M25.559 HIP PAIN, UNSPECIFIED LATERALITY: ICD-10-CM

## 2023-04-06 DIAGNOSIS — M79.642 PAIN IN BOTH HANDS: ICD-10-CM

## 2023-04-06 DIAGNOSIS — M79.641 PAIN IN BOTH HANDS: ICD-10-CM

## 2023-04-06 DIAGNOSIS — M25.531 ARTHRALGIA OF RIGHT WRIST: ICD-10-CM

## 2023-04-06 DIAGNOSIS — Z95.5 PRESENCE OF DRUG COATED STENT IN LAD CORONARY ARTERY: ICD-10-CM

## 2023-04-06 DIAGNOSIS — R53.82 CHRONIC FATIGUE: ICD-10-CM

## 2023-04-06 DIAGNOSIS — H91.93 BILATERAL HEARING LOSS, UNSPECIFIED HEARING LOSS TYPE: ICD-10-CM

## 2023-04-06 DIAGNOSIS — Z95.5 HISTORY OF PLACEMENT OF STENT IN ANTERIOR DESCENDING BRANCH OF LEFT CORONARY ARTERY: ICD-10-CM

## 2023-04-06 DIAGNOSIS — Q38.3 TONGUE ABNORMALITY: ICD-10-CM

## 2023-04-06 DIAGNOSIS — I25.10 ATHEROSCLEROSIS OF NATIVE CORONARY ARTERY OF NATIVE HEART WITHOUT ANGINA PECTORIS: ICD-10-CM

## 2023-04-06 DIAGNOSIS — G47.33 OSA (OBSTRUCTIVE SLEEP APNEA): ICD-10-CM

## 2023-04-06 DIAGNOSIS — K76.0 FATTY LIVER DISEASE, NONALCOHOLIC: ICD-10-CM

## 2023-04-06 LAB
ALBUMIN SERPL-MCNC: 3.9 G/DL (ref 3.4–5)
ALBUMIN/GLOB SERPL: 1 {RATIO} (ref 1–2)
ALP LIVER SERPL-CCNC: 106 U/L
ALT SERPL-CCNC: 44 U/L
ANION GAP SERPL CALC-SCNC: 5 MMOL/L (ref 0–18)
AST SERPL-CCNC: 35 U/L (ref 15–37)
BASOPHILS # BLD AUTO: 0.09 X10(3) UL (ref 0–0.2)
BASOPHILS NFR BLD AUTO: 0.9 %
BILIRUB SERPL-MCNC: 0.9 MG/DL (ref 0.1–2)
BUN BLD-MCNC: 16 MG/DL (ref 7–18)
CALCIUM BLD-MCNC: 9.2 MG/DL (ref 8.5–10.1)
CHLORIDE SERPL-SCNC: 107 MMOL/L (ref 98–112)
CHOLEST SERPL-MCNC: 109 MG/DL (ref ?–200)
CO2 SERPL-SCNC: 28 MMOL/L (ref 21–32)
CREAT BLD-MCNC: 0.97 MG/DL
EOSINOPHIL # BLD AUTO: 0.12 X10(3) UL (ref 0–0.7)
EOSINOPHIL NFR BLD AUTO: 1.3 %
ERYTHROCYTE [DISTWIDTH] IN BLOOD BY AUTOMATED COUNT: 14.6 %
FASTING PATIENT LIPID ANSWER: NO
FASTING STATUS PATIENT QL REPORTED: NO
GFR SERPLBLD BASED ON 1.73 SQ M-ARVRAT: 85 ML/MIN/1.73M2 (ref 60–?)
GLOBULIN PLAS-MCNC: 4 G/DL (ref 2.8–4.4)
GLUCOSE BLD-MCNC: 95 MG/DL (ref 70–99)
HCT VFR BLD AUTO: 44.7 %
HDLC SERPL-MCNC: 44 MG/DL (ref 40–59)
HGB BLD-MCNC: 14.6 G/DL
IMM GRANULOCYTES # BLD AUTO: 0.04 X10(3) UL (ref 0–1)
IMM GRANULOCYTES NFR BLD: 0.4 %
LDLC SERPL CALC-MCNC: 47 MG/DL (ref ?–100)
LYMPHOCYTES # BLD AUTO: 1.49 X10(3) UL (ref 1–4)
LYMPHOCYTES NFR BLD AUTO: 15.6 %
MCH RBC QN AUTO: 32.4 PG (ref 26–34)
MCHC RBC AUTO-ENTMCNC: 32.7 G/DL (ref 31–37)
MCV RBC AUTO: 99.1 FL
MONOCYTES # BLD AUTO: 0.72 X10(3) UL (ref 0.1–1)
MONOCYTES NFR BLD AUTO: 7.5 %
NEUTROPHILS # BLD AUTO: 7.08 X10 (3) UL (ref 1.5–7.7)
NEUTROPHILS # BLD AUTO: 7.08 X10(3) UL (ref 1.5–7.7)
NEUTROPHILS NFR BLD AUTO: 74.3 %
NONHDLC SERPL-MCNC: 65 MG/DL (ref ?–130)
OSMOLALITY SERPL CALC.SUM OF ELEC: 291 MOSM/KG (ref 275–295)
PLATELET # BLD AUTO: 340 10(3)UL (ref 150–450)
POTASSIUM SERPL-SCNC: 4.5 MMOL/L (ref 3.5–5.1)
PROT SERPL-MCNC: 7.9 G/DL (ref 6.4–8.2)
RBC # BLD AUTO: 4.51 X10(6)UL
SODIUM SERPL-SCNC: 140 MMOL/L (ref 136–145)
TRIGL SERPL-MCNC: 94 MG/DL (ref 30–149)
VLDLC SERPL CALC-MCNC: 13 MG/DL (ref 0–30)
WBC # BLD AUTO: 9.5 X10(3) UL (ref 4–11)

## 2023-04-06 PROCEDURE — 80061 LIPID PANEL: CPT | Performed by: FAMILY MEDICINE

## 2023-04-06 PROCEDURE — 80053 COMPREHEN METABOLIC PANEL: CPT | Performed by: FAMILY MEDICINE

## 2023-04-06 PROCEDURE — G0439 PPPS, SUBSEQ VISIT: HCPCS | Performed by: FAMILY MEDICINE

## 2023-04-06 PROCEDURE — 1125F AMNT PAIN NOTED PAIN PRSNT: CPT | Performed by: FAMILY MEDICINE

## 2023-04-06 PROCEDURE — 85025 COMPLETE CBC W/AUTO DIFF WBC: CPT | Performed by: FAMILY MEDICINE

## 2023-04-11 ENCOUNTER — CLINICAL DOCUMENTATION (OUTPATIENT)
Dept: CARDIOLOGY | Age: 68
End: 2023-04-11

## 2023-04-11 ENCOUNTER — TELEPHONE (OUTPATIENT)
Dept: ORTHOPEDICS CLINIC | Facility: CLINIC | Age: 68
End: 2023-04-11

## 2023-04-11 DIAGNOSIS — M79.641 BILATERAL HAND PAIN: Primary | ICD-10-CM

## 2023-04-11 DIAGNOSIS — M79.642 BILATERAL HAND PAIN: Primary | ICD-10-CM

## 2023-04-11 NOTE — TELEPHONE ENCOUNTER
NP Pain in both hands. Please advise if imaging is needed.   Future Appointments   Date Time Provider Jen Stratton   5/8/2023 10:00 AM [facility] [place]   5/18/2023  1:00 PM Martin Nogueira MD [facility]

## 2023-04-11 NOTE — TELEPHONE ENCOUNTER
Patient aware to arrive early for xray:  Future Appointments   Date Time Provider Jen Stratton   5/18/2023 12:45 PM [facility] XR RM1 [facility] XRAY [facility] [place]   5/18/2023  1:00 PM Tim Alfaro MD [facility]

## 2023-04-27 ENCOUNTER — HOSPITAL ENCOUNTER (OUTPATIENT)
Dept: ULTRASOUND IMAGING | Age: 68
Discharge: HOME | End: 2023-04-27
Attending: FAMILY MEDICINE
Payer: MEDICARE

## 2023-04-27 DIAGNOSIS — K76.0 FATTY LIVER DISEASE, NONALCOHOLIC: ICD-10-CM

## 2023-04-27 PROCEDURE — 76700 US EXAM ABDOM COMPLETE: CPT | Performed by: FAMILY MEDICINE

## 2023-05-01 ENCOUNTER — OFFICE VISIT (OUTPATIENT)
Facility: LOCATION | Age: 68
End: 2023-05-01
Payer: MEDICARE

## 2023-05-01 DIAGNOSIS — H61.23 BILATERAL IMPACTED CERUMEN: ICD-10-CM

## 2023-05-01 DIAGNOSIS — H90.3 SENSORY HEARING LOSS, BILATERAL: Primary | ICD-10-CM

## 2023-05-01 DIAGNOSIS — H93.293 ABNORMAL AUDITORY PERCEPTION OF BOTH EARS: Primary | ICD-10-CM

## 2023-05-01 PROCEDURE — 92567 TYMPANOMETRY: CPT | Performed by: AUDIOLOGIST

## 2023-05-01 PROCEDURE — 99204 OFFICE O/P NEW MOD 45 MIN: CPT | Performed by: OTOLARYNGOLOGY

## 2023-05-01 RX ORDER — IPRATROPIUM BROMIDE 42 UG/1
2 SPRAY, METERED NASAL 2 TIMES DAILY
Qty: 15 ML | Refills: 5 | Status: SHIPPED | OUTPATIENT
Start: 2023-05-01

## 2023-05-01 NOTE — PROGRESS NOTES
Clifford Done was seen for an audiometric evaluation and tympanogram today. Referred back to physician. Hearing aid evaluation recommended post medical clearance. CHAN ferrer.     Moe Lynne MS, CCC-A

## 2023-05-04 ENCOUNTER — MED REC SCAN ONLY (OUTPATIENT)
Facility: CLINIC | Age: 68
End: 2023-05-04

## 2023-05-18 ENCOUNTER — HOSPITAL ENCOUNTER (OUTPATIENT)
Dept: GENERAL RADIOLOGY | Age: 68
Discharge: HOME | End: 2023-05-18
Attending: ORTHOPAEDIC SURGERY
Payer: MEDICARE

## 2023-05-18 ENCOUNTER — OFFICE VISIT (OUTPATIENT)
Dept: ORTHOPEDICS CLINIC | Facility: CLINIC | Age: 68
End: 2023-05-18
Payer: MEDICARE

## 2023-05-18 ENCOUNTER — OFFICE VISIT (OUTPATIENT)
Dept: CARDIOLOGY | Age: 68
End: 2023-05-18

## 2023-05-18 ENCOUNTER — MED REC SCAN ONLY (OUTPATIENT)
Dept: FAMILY MEDICINE CLINIC | Facility: CLINIC | Age: 68
End: 2023-05-18

## 2023-05-18 VITALS
DIASTOLIC BLOOD PRESSURE: 72 MMHG | BODY MASS INDEX: 27.4 KG/M2 | HEIGHT: 69 IN | HEART RATE: 60 BPM | WEIGHT: 185 LBS | SYSTOLIC BLOOD PRESSURE: 111 MMHG

## 2023-05-18 VITALS — HEIGHT: 69 IN | BODY MASS INDEX: 27.4 KG/M2 | WEIGHT: 185 LBS

## 2023-05-18 DIAGNOSIS — I10 ESSENTIAL (PRIMARY) HYPERTENSION: ICD-10-CM

## 2023-05-18 DIAGNOSIS — E78.00 PURE HYPERCHOLESTEROLEMIA: ICD-10-CM

## 2023-05-18 DIAGNOSIS — Z95.5 PRESENCE OF CORONARY ANGIOPLASTY IMPLANT AND GRAFT: ICD-10-CM

## 2023-05-18 DIAGNOSIS — M79.641 BILATERAL HAND PAIN: ICD-10-CM

## 2023-05-18 DIAGNOSIS — I25.10 ATHEROSCLEROSIS OF NATIVE CORONARY ARTERY OF NATIVE HEART WITHOUT ANGINA PECTORIS: ICD-10-CM

## 2023-05-18 DIAGNOSIS — M79.642 BILATERAL HAND PAIN: ICD-10-CM

## 2023-05-18 DIAGNOSIS — G56.03 BILATERAL CARPAL TUNNEL SYNDROME: Primary | ICD-10-CM

## 2023-05-18 DIAGNOSIS — R94.39 ABNORMAL CARDIOVASCULAR STRESS TEST: Primary | ICD-10-CM

## 2023-05-18 PROBLEM — Z86.69 HISTORY OF SLEEP APNEA: Chronic | Status: ACTIVE | Noted: 2023-05-18

## 2023-05-18 PROCEDURE — 1125F AMNT PAIN NOTED PAIN PRSNT: CPT | Performed by: ORTHOPAEDIC SURGERY

## 2023-05-18 PROCEDURE — 99214 OFFICE O/P EST MOD 30 MIN: CPT | Performed by: ORTHOPAEDIC SURGERY

## 2023-05-18 PROCEDURE — 73130 X-RAY EXAM OF HAND: CPT | Performed by: ORTHOPAEDIC SURGERY

## 2023-05-18 PROCEDURE — 99214 OFFICE O/P EST MOD 30 MIN: CPT | Performed by: INTERNAL MEDICINE

## 2023-05-18 RX ORDER — ASCORBIC ACID 500 MG
500 TABLET ORAL 2 TIMES DAILY
COMMUNITY

## 2023-05-18 RX ORDER — IPRATROPIUM BROMIDE 42 UG/1
SPRAY, METERED NASAL
COMMUNITY
Start: 2023-05-01

## 2023-05-18 SDOH — HEALTH STABILITY: PHYSICAL HEALTH: ON AVERAGE, HOW MANY MINUTES DO YOU ENGAGE IN EXERCISE AT THIS LEVEL?: 0 MIN

## 2023-05-18 SDOH — HEALTH STABILITY: PHYSICAL HEALTH: ON AVERAGE, HOW MANY DAYS PER WEEK DO YOU ENGAGE IN MODERATE TO STRENUOUS EXERCISE (LIKE A BRISK WALK)?: 0 DAYS

## 2023-05-18 ASSESSMENT — PATIENT HEALTH QUESTIONNAIRE - PHQ9
CLINICAL INTERPRETATION OF PHQ2 SCORE: NO FURTHER SCREENING NEEDED
1. LITTLE INTEREST OR PLEASURE IN DOING THINGS: NOT AT ALL
2. FEELING DOWN, DEPRESSED OR HOPELESS: NOT AT ALL
SUM OF ALL RESPONSES TO PHQ9 QUESTIONS 1 AND 2: 0
SUM OF ALL RESPONSES TO PHQ9 QUESTIONS 1 AND 2: 0

## 2023-05-24 ENCOUNTER — APPOINTMENT (OUTPATIENT)
Dept: CARDIOLOGY | Age: 68
End: 2023-05-24

## 2023-06-14 ENCOUNTER — PROCEDURE VISIT (OUTPATIENT)
Dept: PHYSICAL MEDICINE AND REHAB | Facility: CLINIC | Age: 68
End: 2023-06-14
Payer: MEDICARE

## 2023-06-14 DIAGNOSIS — R20.0 NUMBNESS AND TINGLING IN BOTH HANDS: Primary | ICD-10-CM

## 2023-06-14 DIAGNOSIS — R20.2 NUMBNESS AND TINGLING IN BOTH HANDS: Primary | ICD-10-CM

## 2023-06-14 PROCEDURE — 95885 MUSC TST DONE W/NERV TST LIM: CPT | Performed by: PHYSICAL MEDICINE & REHABILITATION

## 2023-06-14 PROCEDURE — 95911 NRV CNDJ TEST 9-10 STUDIES: CPT | Performed by: PHYSICAL MEDICINE & REHABILITATION

## 2023-06-14 PROCEDURE — 95886 MUSC TEST DONE W/N TEST COMP: CPT | Performed by: PHYSICAL MEDICINE & REHABILITATION

## 2023-06-16 ENCOUNTER — TELEPHONE (OUTPATIENT)
Dept: ORTHOPEDICS CLINIC | Facility: CLINIC | Age: 68
End: 2023-06-16

## 2023-06-16 NOTE — TELEPHONE ENCOUNTER
Patient calling for sooner appointment, completed the EMG on 6/14. Pt is looking for results of the EMG, and a consult for surgery. I advised the patient that a in-person appointment is preferred due to surgery conversation. I added the patient to the wait list for Missy and ISIDRO. Please advise.     Future Appointments   Date Time Provider Jen Chayito   7/13/2023  1:00 PM Anna Alvarez MD [facility]

## 2023-06-29 RX ORDER — METOPROLOL SUCCINATE 25 MG/1
TABLET, EXTENDED RELEASE ORAL
Qty: 90 TABLET | Refills: 0 | Status: SHIPPED | OUTPATIENT
Start: 2023-06-29

## 2023-07-13 ENCOUNTER — OFFICE VISIT (OUTPATIENT)
Dept: ORTHOPEDICS CLINIC | Facility: CLINIC | Age: 68
End: 2023-07-13
Payer: MEDICARE

## 2023-07-13 VITALS — BODY MASS INDEX: 27.4 KG/M2 | WEIGHT: 185 LBS | HEIGHT: 69 IN

## 2023-07-13 DIAGNOSIS — G56.01 CARPAL TUNNEL SYNDROME OF RIGHT WRIST: Primary | ICD-10-CM

## 2023-07-13 PROCEDURE — 1125F AMNT PAIN NOTED PAIN PRSNT: CPT | Performed by: ORTHOPAEDIC SURGERY

## 2023-07-13 PROCEDURE — 99214 OFFICE O/P EST MOD 30 MIN: CPT | Performed by: ORTHOPAEDIC SURGERY

## 2023-07-27 NOTE — TELEPHONE ENCOUNTER
Spoke with patient  Nurse visit for urine sample scheduled  Screening questions answered Nursing reports no acute events overnight. Per chart review the patient has not required any behavioral PRNS since the last assessment.    Chart reviewed, patient seen and evaluated in AM. On approach, .... Patient reports ________________.   Patient endorsed OK sleep and appetite.     No SI/HI/AVH elicited.   Nursing reports no acute events overnight. Per chart review the patient has not required any behavioral PRNS since the last assessment.    Chart reviewed, patient seen and evaluated in AM. On approach, patient is asleep with the covers over his head. Patient reports that he is cold. He denies any EPS.  Patient endorsed OK sleep and appetite. Discharge papers were reviewed and signed, with all concerns addressed. No SI/HI/AVH elicited.

## 2023-09-05 ENCOUNTER — HOSPITAL ENCOUNTER (OUTPATIENT)
Age: 68
Discharge: HOME | End: 2023-09-05
Payer: MEDICARE

## 2023-09-05 VITALS
SYSTOLIC BLOOD PRESSURE: 132 MMHG | HEART RATE: 75 BPM | TEMPERATURE: 98 F | RESPIRATION RATE: 16 BRPM | OXYGEN SATURATION: 94 % | DIASTOLIC BLOOD PRESSURE: 78 MMHG

## 2023-09-05 DIAGNOSIS — H60.332 ACUTE SWIMMER'S EAR OF LEFT SIDE: Primary | ICD-10-CM

## 2023-09-05 PROCEDURE — 99213 OFFICE O/P EST LOW 20 MIN: CPT | Performed by: PHYSICIAN ASSISTANT

## 2023-09-05 RX ORDER — AMOXICILLIN AND CLAVULANATE POTASSIUM 875; 125 MG/1; MG/1
1 TABLET, FILM COATED ORAL 2 TIMES DAILY
Qty: 20 TABLET | Refills: 0 | Status: SHIPPED | OUTPATIENT
Start: 2023-09-05 | End: 2023-09-15

## 2023-09-05 RX ORDER — NEOMYCIN SULFATE, POLYMYXIN B SULFATE AND HYDROCORTISONE 10; 3.5; 1 MG/ML; MG/ML; [USP'U]/ML
4 SUSPENSION/ DROPS AURICULAR (OTIC) 4 TIMES DAILY
Qty: 10 ML | Refills: 0 | Status: SHIPPED | OUTPATIENT
Start: 2023-09-05

## 2023-09-05 NOTE — DISCHARGE INSTRUCTIONS
If you develop severe headache, neck pain or bloody drainage from the left ear please immediately seek reevaluation.

## 2023-10-23 ENCOUNTER — TELEPHONE (OUTPATIENT)
Dept: ORTHOPEDICS CLINIC | Facility: CLINIC | Age: 68
End: 2023-10-23

## 2023-10-23 DIAGNOSIS — G56.01 CARPAL TUNNEL SYNDROME OF RIGHT WRIST: Primary | ICD-10-CM

## 2023-10-23 NOTE — TELEPHONE ENCOUNTER
Date of Surgery: 23       Post Op Appt: 23 @ 0900    Case ID: [id number]    Notes:         SURGICAL BOOKING SHEET   Name: Erica Delgado  MRN: [id number]   : 4/3/1955     Surgical Date:    23   Surgical Consent:    Right endoscopic carpal tunnel release, possible open   Diagnosis:     (G56.01) Carpal tunnel syndrome of right wrist  (primary encounter diagnosis)    Procedure Codes:    Endoscopic carpal tunnel release (CPT 86176)   Disposition:    Outpatient   Operative Time:    15 mins   Antibiotics:    Ancef 2g   Anesthesia Type:    Monitored Anesthesia Care (MAC)   Clearance:     NONE   Equipment:    ECTR: [place] [employer], [employer] Endoscopic System, Lead Hand (on standby), 5-0 moncryl, Exofin, Telfa+Tegaderm , 1% lidocaine with Epi + 0.5% marcaine + bicarb   Assistant:    Cara Garcia Assistant: Kumar Bunch PA-C   Follow Up:    7-10 days post op with Madeleine Sandoval   Pain Medication:    Norco 325-5mg   Therapy:    None

## 2023-10-23 NOTE — PROGRESS NOTES
SURGICAL BOOKING SHEET   Name: Talha Aguillon  MRN: [id number]   : 4/3/1955    Surgical Date:   23   Surgical Consent:   Right endoscopic carpal tunnel release, possible open   Diagnosis:    (G56.01) Carpal tunnel syndrome of right wrist  (primary encounter diagnosis)    Procedure Codes:   Endoscopic carpal tunnel release (CPT 18843)   Disposition:   Outpatient   Operative Time:   15 mins   Antibiotics:   Ancef 2g   Anesthesia Type:   Monitored Anesthesia Care (MAC)   Clearance:    NONE   Equipment:   ECTR: [place] Blade, Microaire Endoscopic System, Lead Hand (on standby), 5-0 moncryl, Exofin, Telfa+Tegaderm , 1% lidocaine with Epi + 0.5% marcaine + bicarb   Assistant:   Rogelio Moctezuma Assistant: Courtney Cordova PA-C   Follow Up:   7-10 days post op with Elkin Farah   Pain Medication:   Norco 325-5mg   Therapy:   None

## 2023-10-24 NOTE — TELEPHONE ENCOUNTER
Called and spoke with [facility] in regards to his upcoming surgery. I did go over the surgical protocol and post op appt with him. He states understanding to everything that was explained with no questions or concerns.

## 2023-11-16 ENCOUNTER — TELEPHONE (OUTPATIENT)
Dept: FAMILY MEDICINE CLINIC | Facility: CLINIC | Age: 68
End: 2023-11-16

## 2023-11-16 NOTE — TELEPHONE ENCOUNTER
Spoke with patient who states he is having blood work done and is seeing cardiology on Monday. States he thought that was all he needs. Will check with surgeon to confirm. Advised if PCP clearance is needed let us know and we will get him in.

## 2023-11-20 ENCOUNTER — TELEPHONE (OUTPATIENT)
Dept: CARDIOLOGY | Age: 68
End: 2023-11-20

## 2023-11-20 ENCOUNTER — APPOINTMENT (OUTPATIENT)
Dept: CARDIOLOGY | Age: 68
End: 2023-11-20

## 2023-11-20 VITALS
HEART RATE: 75 BPM | DIASTOLIC BLOOD PRESSURE: 70 MMHG | WEIGHT: 190 LBS | HEIGHT: 69 IN | SYSTOLIC BLOOD PRESSURE: 128 MMHG | BODY MASS INDEX: 28.14 KG/M2

## 2023-11-20 DIAGNOSIS — Z95.5 PRESENCE OF CORONARY ANGIOPLASTY IMPLANT AND GRAFT: ICD-10-CM

## 2023-11-20 DIAGNOSIS — I10 ESSENTIAL (PRIMARY) HYPERTENSION: ICD-10-CM

## 2023-11-20 DIAGNOSIS — Z86.69 HISTORY OF SLEEP APNEA: Chronic | ICD-10-CM

## 2023-11-20 DIAGNOSIS — I25.10 ATHEROSCLEROSIS OF NATIVE CORONARY ARTERY OF NATIVE HEART WITHOUT ANGINA PECTORIS: ICD-10-CM

## 2023-11-20 DIAGNOSIS — E78.00 PURE HYPERCHOLESTEROLEMIA: ICD-10-CM

## 2023-11-20 DIAGNOSIS — R94.39 ABNORMAL CARDIOVASCULAR STRESS TEST: Primary | ICD-10-CM

## 2023-11-20 DIAGNOSIS — R07.2 CHEST PAIN, PRECORDIAL: ICD-10-CM

## 2023-11-20 PROCEDURE — 99214 OFFICE O/P EST MOD 30 MIN: CPT | Performed by: INTERNAL MEDICINE

## 2023-11-20 SDOH — HEALTH STABILITY: PHYSICAL HEALTH: ON AVERAGE, HOW MANY MINUTES DO YOU ENGAGE IN EXERCISE AT THIS LEVEL?: 0 MIN

## 2023-11-20 SDOH — HEALTH STABILITY: PHYSICAL HEALTH: ON AVERAGE, HOW MANY DAYS PER WEEK DO YOU ENGAGE IN MODERATE TO STRENUOUS EXERCISE (LIKE A BRISK WALK)?: 0 DAYS

## 2023-11-20 ASSESSMENT — PATIENT HEALTH QUESTIONNAIRE - PHQ9
SUM OF ALL RESPONSES TO PHQ9 QUESTIONS 1 AND 2: 0
2. FEELING DOWN, DEPRESSED OR HOPELESS: NOT AT ALL
1. LITTLE INTEREST OR PLEASURE IN DOING THINGS: NOT AT ALL
SUM OF ALL RESPONSES TO PHQ9 QUESTIONS 1 AND 2: 0
CLINICAL INTERPRETATION OF PHQ2 SCORE: NO FURTHER SCREENING NEEDED

## 2023-11-21 ENCOUNTER — NURSE ONLY (OUTPATIENT)
Dept: FAMILY MEDICINE CLINIC | Facility: CLINIC | Age: 68
End: 2023-11-21
Payer: MEDICARE

## 2023-11-21 ENCOUNTER — MED REC SCAN ONLY (OUTPATIENT)
Dept: FAMILY MEDICINE CLINIC | Facility: CLINIC | Age: 68
End: 2023-11-21

## 2023-11-21 ENCOUNTER — EXTERNAL LAB (OUTPATIENT)
Dept: CARDIOLOGY | Age: 68
End: 2023-11-21

## 2023-11-21 DIAGNOSIS — Z95.5 PRESENCE OF DRUG COATED STENT IN LAD CORONARY ARTERY: ICD-10-CM

## 2023-11-21 DIAGNOSIS — Z95.5 HISTORY OF PLACEMENT OF STENT IN ANTERIOR DESCENDING BRANCH OF LEFT CORONARY ARTERY: Primary | ICD-10-CM

## 2023-11-21 DIAGNOSIS — G56.01 CARPAL TUNNEL SYNDROME OF RIGHT WRIST: ICD-10-CM

## 2023-11-21 DIAGNOSIS — I25.10 ATHEROSCLEROSIS OF NATIVE CORONARY ARTERY OF NATIVE HEART WITHOUT ANGINA PECTORIS: ICD-10-CM

## 2023-11-21 DIAGNOSIS — I10 ESSENTIAL HYPERTENSION: ICD-10-CM

## 2023-11-21 LAB
ABSOLUTE IMMATURE GRANULOCYTES (OFFPRE24): 0.04 X10 (3) UL (ref 0–1)
ALBUMIN SERPL-MCNC: 3.9 G/DL (ref 3.4–5)
ALBUMIN SERPL-MCNC: 3.9 G/DL (ref 3.4–5)
ALBUMIN/GLOB SERPL: 1.1 {RATIO} (ref 1–2)
ALBUMIN/GLOB SERPL: 1.1 {RATIO} (ref 1–2)
ALP LIVER SERPL-CCNC: 89 U/L
ALP SERPL-CCNC: 89 U/L (ref 45–117)
ALT SERPL-CCNC: 48 U/L
ALT SERPL-CCNC: 48 U/L (ref 16–61)
ANION GAP SERPL CALC-SCNC: 6 MMOL/L (ref 0–18)
ANION GAP SERPL CALC-SCNC: 6 MMOL/L (ref 0–18)
AST SERPL-CCNC: 17 U/L (ref 15–37)
AST SERPL-CCNC: 17 U/L (ref 15–37)
ATRIAL RATE: 57 BPM
BASOPHILS # BLD AUTO: 0.09 X10(3) UL (ref 0–0.2)
BASOPHILS # BLD: 0.09 X10 (3) UL (ref 0–0.2)
BASOPHILS NFR BLD AUTO: 1.2 %
BASOPHILS NFR BLD: 1.2 %
BILIRUB SERPL-MCNC: 1.1 MG/DL (ref 0.1–2)
BILIRUB SERPL-MCNC: 1.1 MG/DL (ref 0.1–2)
BUN BLD-MCNC: 15 MG/DL (ref 9–23)
BUN SERPL-MCNC: 15 MG/DL (ref 9–23)
CALCIUM BLD-MCNC: 9 MG/DL (ref 8.5–10.1)
CALCIUM SERPL-MCNC: 9 MG/DL (ref 8.5–10.1)
CALCULATED OSMO: 295 MOSM/KG (ref 275–295)
CHLORIDE SERPL-SCNC: 108 MMOL/L (ref 98–112)
CHLORIDE SERPL-SCNC: 108 MMOL/L (ref 98–112)
CHOLEST SERPL-MCNC: 140 MG/DL
CHOLEST SERPL-MCNC: 140 MG/DL (ref ?–200)
CO2 SERPL-SCNC: 28 MMOL/L (ref 21–32)
CO2 SERPL-SCNC: 28 MMOL/L (ref 21–32)
CREAT BLD-MCNC: 1.19 MG/DL
CREAT SERPL-MCNC: 1.19 MG/DL (ref 0.7–1.3)
EGFRCR SERPLBLD CKD-EPI 2021: 67 ML/MIN/1.73M2 (ref 60–?)
EOSINOPHIL # BLD AUTO: 0.11 X10(3) UL (ref 0–0.7)
EOSINOPHIL # BLD: 0.11 X10 (3) UL (ref 0–0.7)
EOSINOPHIL NFR BLD AUTO: 1.5 %
EOSINOPHIL NFR BLD: 1.5 %
ERYTHROCYTE [DISTWIDTH] IN BLOOD BY AUTOMATED COUNT: 14.6 %
ERYTHROCYTE [DISTWIDTH] IN BLOOD: 14.6 %
FASTING PATIENT LIPID ANSWER: YES
FASTING STATUS PATIENT QL REPORTED: YES
GFR SERPLBLD SCHWARTZ-ARVRAT: 67 ML/MIN/1.73M2
GLOBULIN PLAS-MCNC: 3.6 G/DL (ref 2.8–4.4)
GLOBULIN SER-MCNC: 3.6 G/DL (ref 2.8–4.4)
GLUCOSE BLD-MCNC: 103 MG/DL (ref 70–99)
GLUCOSE SERPL-MCNC: 103 MG/DL (ref 70–99)
HCT VFR BLD AUTO: 45.8 %
HCT VFR BLD CALC: 45.8 % (ref 39–53)
HDLC SERPL-MCNC: 46 MG/DL (ref 40–59)
HDLC SERPL-MCNC: 46 MG/DL (ref 40–59)
HGB BLD-MCNC: 15.2 G/DL
HGB BLD-MCNC: 15.2 G/DL (ref 13–17.5)
IMM GRANULOCYTES # BLD AUTO: 0.04 X10(3) UL (ref 0–1)
IMM GRANULOCYTES NFR BLD: 0.6 %
IMMATURE GRANULOCYTES (OFFPRE25): 0.6 %
LDLC SERPL CALC-MCNC: 78 MG/DL
LDLC SERPL CALC-MCNC: 78 MG/DL (ref ?–100)
LENGTH OF FAST TIME PATIENT: YES H
LENGTH OF FAST TIME PATIENT: YES H
LYMPHOCYTES # BLD AUTO: 1.73 X10(3) UL (ref 1–4)
LYMPHOCYTES # BLD: 1.73 X10 (3) UL (ref 1–4)
LYMPHOCYTES NFR BLD AUTO: 23.9 %
LYMPHOCYTES NFR BLD: 23.9 %
MCH RBC QN AUTO: 33.4 PG (ref 26–34)
MCH RBC QN AUTO: 33.4 PG (ref 26–34)
MCHC RBC AUTO-ENTMCNC: 33.2 G/DL (ref 31–37)
MCHC RBC AUTO-ENTMCNC: 33.2 G/DL (ref 31–37)
MCV RBC AUTO: 100.7 FL
MCV RBC AUTO: 100.7 FL (ref 80–100)
MONOCYTES # BLD AUTO: 0.68 X10(3) UL (ref 0.1–1)
MONOCYTES # BLD: 0.68 X10 (3) UL (ref 0.1–1)
MONOCYTES NFR BLD AUTO: 9.4 %
MONOCYTES NFR BLD: 9.4 %
NEUTROPHILS # BLD AUTO: 4.58 X10 (3) UL (ref 1.5–7.7)
NEUTROPHILS # BLD AUTO: 4.58 X10(3) UL (ref 1.5–7.7)
NEUTROPHILS # BLD: 4.58 X10 (3) UL (ref 1.5–7.7)
NEUTROPHILS NFR BLD AUTO: 63.4 %
NEUTROPHILS NFR BLD: 63.4 %
NONHDLC SERPL-MCNC: 94 MG/DL
NONHDLC SERPL-MCNC: 94 MG/DL (ref ?–130)
OSMOLALITY SERPL CALC.SUM OF ELEC: 295 MOSM/KG (ref 275–295)
P AXIS: 1 DEGREES
P-R INTERVAL: 148 MS
PLATELET # BLD AUTO: 361 10(3)UL (ref 150–450)
PLATELET # BLD: 361 10(3)UL (ref 150–450)
POTASSIUM SERPL-SCNC: 4.1 MMOL/L (ref 3.5–5.1)
POTASSIUM SERPL-SCNC: 4.1 MMOL/L (ref 3.5–5.1)
PROT SERPL-MCNC: 7.5 G/DL (ref 6.4–8.2)
PROT SERPL-MCNC: 7.5 G/DL (ref 6.4–8.2)
Q-T INTERVAL: 378 MS
QRS DURATION: 80 MS
QTC CALCULATION (BEZET): 367 MS
R AXIS: 42 DEGREES
RBC # BLD AUTO: 4.55 X10(6)UL
RBC # BLD: 4.55 X10 (6) UL (ref 3.8–5.8)
SODIUM SERPL-SCNC: 142 MMOL/L (ref 136–145)
SODIUM SERPL-SCNC: 142 MMOL/L (ref 136–145)
T AXIS: 55 DEGREES
TRIGL SERPL-MCNC: 80 MG/DL (ref 30–149)
TRIGL SERPL-MCNC: 80 MG/DL (ref 30–149)
VENTRICULAR RATE: 57 BPM
VLDLC SERPL CALC-MCNC: 12 MG/DL (ref 0–30)
VLDLC SERPL CALC-MCNC: 12 MG/DL (ref 0–30)
WBC # BLD AUTO: 7.2 X10(3) UL (ref 4–11)
WBC # BLD: 7.2 X10 (3) UL (ref 4–11)

## 2023-11-21 PROCEDURE — 80061 LIPID PANEL: CPT | Performed by: INTERNAL MEDICINE

## 2023-11-21 PROCEDURE — 80053 COMPREHEN METABOLIC PANEL: CPT | Performed by: INTERNAL MEDICINE

## 2023-11-21 PROCEDURE — 93000 ELECTROCARDIOGRAM COMPLETE: CPT | Performed by: FAMILY MEDICINE

## 2023-11-21 PROCEDURE — 85025 COMPLETE CBC W/AUTO DIFF WBC: CPT | Performed by: INTERNAL MEDICINE

## 2023-11-21 NOTE — PROGRESS NOTES
Patient to clinic for labs per Dr King   Patient does not have order with him but notes from  note he needs lipid, cmp and CBC  Orders placed    Mint and lavender tube drawn right  medial AC x 1 attempt    Patient also states he needs an EKG for upcoming surgery  Saw cardiology yesterday but was told their tech was off so could not do EKG there.   EKG performed in office, result in epic      Patient left office in stable condition

## 2023-12-01 ENCOUNTER — TELEPHONE (OUTPATIENT)
Dept: ORTHOPEDICS CLINIC | Facility: CLINIC | Age: 68
End: 2023-12-01

## 2023-12-01 ENCOUNTER — HOSPITAL ENCOUNTER (OUTPATIENT)
Facility: HOSPITAL | Age: 68
Setting detail: HOSPITAL OUTPATIENT SURGERY
Discharge: HOME | End: 2023-12-01
Attending: ORTHOPAEDIC SURGERY | Admitting: ORTHOPAEDIC SURGERY
Payer: MEDICARE

## 2023-12-01 ENCOUNTER — ANESTHESIA EVENT (OUTPATIENT)
Dept: SURGERY | Facility: HOSPITAL | Age: 68
End: 2023-12-01
Payer: MEDICARE

## 2023-12-01 ENCOUNTER — ANESTHESIA (OUTPATIENT)
Dept: SURGERY | Facility: HOSPITAL | Age: 68
End: 2023-12-01
Payer: MEDICARE

## 2023-12-01 VITALS
DIASTOLIC BLOOD PRESSURE: 80 MMHG | SYSTOLIC BLOOD PRESSURE: 131 MMHG | OXYGEN SATURATION: 100 % | RESPIRATION RATE: 18 BRPM | HEIGHT: 69 IN | BODY MASS INDEX: 28.29 KG/M2 | TEMPERATURE: 97 F | HEART RATE: 56 BPM | WEIGHT: 191 LBS

## 2023-12-01 DIAGNOSIS — G56.01 CARPAL TUNNEL SYNDROME OF RIGHT WRIST: Primary | ICD-10-CM

## 2023-12-01 PROCEDURE — 01N54ZZ RELEASE MEDIAN NERVE, PERCUTANEOUS ENDOSCOPIC APPROACH: ICD-10-PCS | Performed by: EMERGENCY MEDICINE

## 2023-12-01 RX ORDER — SODIUM CHLORIDE, SODIUM LACTATE, POTASSIUM CHLORIDE, CALCIUM CHLORIDE 600; 310; 30; 20 MG/100ML; MG/100ML; MG/100ML; MG/100ML
INJECTION, SOLUTION INTRAVENOUS CONTINUOUS
Status: DISCONTINUED | OUTPATIENT
Start: 2023-12-01 | End: 2023-12-01

## 2023-12-01 RX ORDER — METOCLOPRAMIDE HYDROCHLORIDE 5 MG/ML
10 INJECTION INTRAMUSCULAR; INTRAVENOUS EVERY 8 HOURS PRN
Status: DISCONTINUED | OUTPATIENT
Start: 2023-12-01 | End: 2023-12-01

## 2023-12-01 RX ORDER — KETOROLAC TROMETHAMINE 30 MG/ML
INJECTION, SOLUTION INTRAMUSCULAR; INTRAVENOUS AS NEEDED
Status: DISCONTINUED | OUTPATIENT
Start: 2023-12-01 | End: 2023-12-01 | Stop reason: SURG

## 2023-12-01 RX ORDER — HYDROCODONE BITARTRATE AND ACETAMINOPHEN 5; 325 MG/1; MG/1
2 TABLET ORAL ONCE AS NEEDED
Status: DISCONTINUED | OUTPATIENT
Start: 2023-12-01 | End: 2023-12-01

## 2023-12-01 RX ORDER — ACETAMINOPHEN 500 MG
1000 TABLET ORAL ONCE AS NEEDED
Status: DISCONTINUED | OUTPATIENT
Start: 2023-12-01 | End: 2023-12-01

## 2023-12-01 RX ORDER — ACETAMINOPHEN 500 MG
1000 TABLET ORAL ONCE
Status: DISCONTINUED | OUTPATIENT
Start: 2023-12-01 | End: 2023-12-01 | Stop reason: HOSPADM

## 2023-12-01 RX ORDER — HYDROCODONE BITARTRATE AND ACETAMINOPHEN 5; 325 MG/1; MG/1
1 TABLET ORAL ONCE AS NEEDED
Status: DISCONTINUED | OUTPATIENT
Start: 2023-12-01 | End: 2023-12-01

## 2023-12-01 RX ORDER — CEFAZOLIN SODIUM/WATER 2 G/20 ML
2 SYRINGE (ML) INTRAVENOUS ONCE
Status: COMPLETED | OUTPATIENT
Start: 2023-12-01 | End: 2023-12-01

## 2023-12-01 RX ORDER — LIDOCAINE HYDROCHLORIDE 10 MG/ML
INJECTION, SOLUTION EPIDURAL; INFILTRATION; INTRACAUDAL; PERINEURAL AS NEEDED
Status: DISCONTINUED | OUTPATIENT
Start: 2023-12-01 | End: 2023-12-01 | Stop reason: SURG

## 2023-12-01 RX ORDER — HYDROMORPHONE HYDROCHLORIDE 1 MG/ML
0.2 INJECTION, SOLUTION INTRAMUSCULAR; INTRAVENOUS; SUBCUTANEOUS EVERY 5 MIN PRN
Status: DISCONTINUED | OUTPATIENT
Start: 2023-12-01 | End: 2023-12-01

## 2023-12-01 RX ORDER — MIDAZOLAM HYDROCHLORIDE 1 MG/ML
1 INJECTION INTRAMUSCULAR; INTRAVENOUS EVERY 5 MIN PRN
Status: DISCONTINUED | OUTPATIENT
Start: 2023-12-01 | End: 2023-12-01

## 2023-12-01 RX ORDER — HYDROCODONE BITARTRATE AND ACETAMINOPHEN 5; 325 MG/1; MG/1
1 TABLET ORAL EVERY 6 HOURS PRN
Qty: 5 TABLET | Refills: 0 | Status: SHIPPED | OUTPATIENT
Start: 2023-12-01 | End: 2023-12-08

## 2023-12-01 RX ORDER — NALOXONE HYDROCHLORIDE 0.4 MG/ML
0.08 INJECTION, SOLUTION INTRAMUSCULAR; INTRAVENOUS; SUBCUTANEOUS AS NEEDED
Status: DISCONTINUED | OUTPATIENT
Start: 2023-12-01 | End: 2023-12-01

## 2023-12-01 RX ORDER — HYDROMORPHONE HYDROCHLORIDE 1 MG/ML
0.6 INJECTION, SOLUTION INTRAMUSCULAR; INTRAVENOUS; SUBCUTANEOUS EVERY 5 MIN PRN
Status: DISCONTINUED | OUTPATIENT
Start: 2023-12-01 | End: 2023-12-01

## 2023-12-01 RX ORDER — ONDANSETRON 2 MG/ML
4 INJECTION INTRAMUSCULAR; INTRAVENOUS EVERY 6 HOURS PRN
Status: DISCONTINUED | OUTPATIENT
Start: 2023-12-01 | End: 2023-12-01

## 2023-12-01 RX ORDER — MEPERIDINE HYDROCHLORIDE 25 MG/ML
25 INJECTION INTRAMUSCULAR; INTRAVENOUS; SUBCUTANEOUS
Status: DISCONTINUED | OUTPATIENT
Start: 2023-12-01 | End: 2023-12-01

## 2023-12-01 RX ORDER — HYDROMORPHONE HYDROCHLORIDE 1 MG/ML
0.4 INJECTION, SOLUTION INTRAMUSCULAR; INTRAVENOUS; SUBCUTANEOUS EVERY 5 MIN PRN
Status: DISCONTINUED | OUTPATIENT
Start: 2023-12-01 | End: 2023-12-01

## 2023-12-01 RX ADMIN — SODIUM CHLORIDE, SODIUM LACTATE, POTASSIUM CHLORIDE, CALCIUM CHLORIDE: 600; 310; 30; 20 INJECTION, SOLUTION INTRAVENOUS at 16:49:00

## 2023-12-01 RX ADMIN — CEFAZOLIN SODIUM/WATER 2 G: 2 G/20 ML SYRINGE (ML) INTRAVENOUS at 16:18:00

## 2023-12-01 RX ADMIN — LIDOCAINE HYDROCHLORIDE 10 MG: 10 INJECTION, SOLUTION EPIDURAL; INFILTRATION; INTRACAUDAL; PERINEURAL at 16:20:00

## 2023-12-01 RX ADMIN — KETOROLAC TROMETHAMINE 30 MG: 30 INJECTION, SOLUTION INTRAMUSCULAR; INTRAVENOUS at 16:45:00

## 2023-12-01 NOTE — OPERATIVE REPORT
Operative Note    Patient Name: Lashae Gardner    12/1/2023    Preoperative Diagnosis:     Carpal tunnel syndrome of right wrist [G56.01]    Postoperative Diagnosis:     Carpal tunnel syndrome of right wrist [G56.01]    Surgeon(s) and Role:     Angella Dexter MD - Primary     Assistant: Ling Gerber PA-C    A PA was needed for the successful completion of this case. She was essential for the proper positioning of patient, manipulation of instruments, proper exposure, manipulation of soft tissue, and wound closure. Procedures:     Right carpal tunnel release     Antibiotics: Ancef 2g    Surgical Findings: tenosynovitis obstructive view of TCL    Anesthesia: MAC + Local    Complications: None    Implants: None    Specimen: None    Condition: Stable    Estimated Blood Loss:1 mL    Indications:  76year old male carpal tunnel syndrome that failed non surgical management . Patient consented to an endoscopic carpal tunnel release possible open having understood the risks associated with surgery, expected outcome, time to recovery and the need for possible rehab. Risks that were discussed but not limited to infection, nerve injury, tendon injury, artery injury, need for additional surgery, and no improvements of present symptoms. Procedure:  Patient was met in the preoperative holding area where consent was verified, laterality was marked with the surgeon's initials, and the H&P was updated. Patient was brought to the operating room on a transport cart. Patient was then transferred onto the operating room table and placed in supine position with an arm table and with bony prominences well-padded. SCDs were placed. Antibiotics were fully infused. An upper arm tourniquet was placed and the limb was exsanguinated using Esmarch bandage. Tourniquet was raised to 250mmHg. A marcia block was subsequently performed. The arm was then prepped and draped in the usual sterile fashion.  A surgical timeout was performed. A transverse incision through the dermis was made 5mm proximal to the distal volar wrist crease just ulnar to the palmaris longus using a 15 blade. Wood's and Edna Euceda scissors was used to dissect through the subcutaneous tissue onto the antebrachial fascia. A distally based 1 cm wide rectangular flap was elevated using a [place] blade. The flap was retracted distally and volarly allowing access to the carpal tunnel. The undersurface of the transverse carpal ligament was accessed, cleaned, and dilated. There was too much synovial tissue for adequate visualization of the transverse carpal ligament. Patient was converted to open carpal tunnel release. A 15 blade was used to make an incision along the radial border of the ring finger with the distal extent being the hook of the hamate and the proximal extent being the pisiform. 15 blade was used to make an incision through the dermis. A Ray-Mikel was used to perform blunt dissection onto the palmar fascia. Palmar fascia was incised longitudinally. Blunt dissection was once again used to identify the transverse carpal ligament which was then divided longitudinally along the entirety of its length. A tight carpal tunnel was noted. The soft tissue was dissected off the proximal edge of transverse carpal ligament and antebrachial fascia superficial and deep to it using INDRA Greenberg. Sheth scissors was used to perform a push cut to release at. The wound was irrigated with sterile normal saline. Tourniquet was let down and bipolar cautery was used to achieve hemostasis. Closure: The incision was then closed with 4-0 nylon in a horizontal mattress fashion. The endoscopic CTR incision was closed using 4-0 Monocryl in a buried simple interrupted fashion. Exofin skin glue was applied and Steri-Strips were placed. Dressing/Splint:  Bacitracin, 4 x 4 gauze, and a loosely wrapped Ace wrap was used to hold the dressing in place. Post Operative:  Patient was woken up from anesthesia and taken to PACU for further recovery and discharge home. Jessica Lares MD  Hand, Wrist, & Elbow Surgery  [facility]  [facility] [address], [address] [facility], [address] [place] [address]. [facility]  [email or web address]  t: [phone]  f: [phone]

## 2023-12-04 RX ORDER — METOPROLOL SUCCINATE 25 MG/1
25 TABLET, EXTENDED RELEASE ORAL DAILY
Qty: 90 TABLET | Refills: 0 | Status: SHIPPED | OUTPATIENT
Start: 2023-12-04

## 2023-12-04 NOTE — TELEPHONE ENCOUNTER
Hypertension Medications Protocol [id number]/04/2023 01:59 PM   Protocol Details Appointment in past 6 or next 3 months    CMP or BMP in past 12 months    Last serum creatinine< 2.0       LOV 4/6/23    Last Refill  6/29/23 Refill 0  #30     Labs 11/21/23      Future Appointments   Date Time Provider Jen Stratton   12/11/2023  9:00 AM RUSTY Francis [id number]

## 2023-12-05 ENCOUNTER — PATIENT MESSAGE (OUTPATIENT)
Dept: FAMILY MEDICINE CLINIC | Facility: CLINIC | Age: 68
End: 2023-12-05

## 2023-12-06 NOTE — TELEPHONE ENCOUNTER
From: Berenice Haddad  To: Noah Moulton  Sent: 12/5/2023 6:19 PM CST  Subject: Crusty ears/scalp    I have tried the hydrocortisone 2.5 and the problem is only getting worse.

## 2023-12-06 NOTE — TELEPHONE ENCOUNTER
Do you want to see this? I don't see any notes that you have seen this crusty? Just a Mychart to try some hydrocortisone.

## 2023-12-08 ENCOUNTER — OFFICE VISIT (OUTPATIENT)
Dept: FAMILY MEDICINE CLINIC | Facility: CLINIC | Age: 68
End: 2023-12-08
Payer: MEDICARE

## 2023-12-08 VITALS
SYSTOLIC BLOOD PRESSURE: 116 MMHG | DIASTOLIC BLOOD PRESSURE: 80 MMHG | BODY MASS INDEX: 28.14 KG/M2 | RESPIRATION RATE: 14 BRPM | OXYGEN SATURATION: 96 % | HEART RATE: 74 BPM | HEIGHT: 69 IN | WEIGHT: 190 LBS | TEMPERATURE: 97 F

## 2023-12-08 DIAGNOSIS — L40.9 PSORIASIS: Primary | ICD-10-CM

## 2023-12-08 PROCEDURE — 99214 OFFICE O/P EST MOD 30 MIN: CPT | Performed by: FAMILY MEDICINE

## 2023-12-08 RX ORDER — CLOBETASOL PROPIONATE 0.46 MG/ML
SOLUTION TOPICAL
Qty: 50 ML | Refills: 0 | Status: SHIPPED | OUTPATIENT
Start: 2023-12-08

## 2023-12-08 NOTE — TELEPHONE ENCOUNTER
Future Appointments   Date Time Provider Jen Stratton   12/8/2023 10:00 AM Gypsy Dick [facility]   12/14/2023  8:40 AM RUSTY Boggs [facility]     Dr. Alize Patel, please disregard message. Pt is already scheduled.

## 2023-12-14 ENCOUNTER — OFFICE VISIT (OUTPATIENT)
Dept: ORTHOPEDICS CLINIC | Facility: CLINIC | Age: 68
End: 2023-12-14
Payer: MEDICARE

## 2023-12-14 VITALS — HEIGHT: 69 IN | WEIGHT: 190 LBS | BODY MASS INDEX: 28.14 KG/M2

## 2023-12-14 DIAGNOSIS — G56.01 CARPAL TUNNEL SYNDROME OF RIGHT WRIST: Primary | ICD-10-CM

## 2023-12-14 PROCEDURE — 99024 POSTOP FOLLOW-UP VISIT: CPT | Performed by: PHYSICIAN ASSISTANT

## 2023-12-14 PROCEDURE — 1126F AMNT PAIN NOTED NONE PRSNT: CPT | Performed by: PHYSICIAN ASSISTANT

## 2024-01-15 RX ORDER — ATORVASTATIN CALCIUM 20 MG/1
20 TABLET, FILM COATED ORAL DAILY
Qty: 90 TABLET | Refills: 3 | Status: SHIPPED | OUTPATIENT
Start: 2024-01-15

## 2024-04-01 ENCOUNTER — OFFICE VISIT (OUTPATIENT)
Dept: FAMILY MEDICINE CLINIC | Facility: CLINIC | Age: 69
End: 2024-04-01
Payer: MEDICARE

## 2024-04-01 VITALS
TEMPERATURE: 98 F | RESPIRATION RATE: 14 BRPM | SYSTOLIC BLOOD PRESSURE: 120 MMHG | DIASTOLIC BLOOD PRESSURE: 76 MMHG | HEART RATE: 82 BPM | OXYGEN SATURATION: 97 % | HEIGHT: 69 IN | BODY MASS INDEX: 28.14 KG/M2 | WEIGHT: 190 LBS

## 2024-04-01 DIAGNOSIS — G47.33 OSA (OBSTRUCTIVE SLEEP APNEA): ICD-10-CM

## 2024-04-01 DIAGNOSIS — M79.641 PAIN IN BOTH HANDS: ICD-10-CM

## 2024-04-01 DIAGNOSIS — Z12.5 SCREENING FOR MALIGNANT NEOPLASM OF PROSTATE: ICD-10-CM

## 2024-04-01 DIAGNOSIS — R41.3 MEMORY IMPAIRMENT: ICD-10-CM

## 2024-04-01 DIAGNOSIS — M25.531 ARTHRALGIA OF RIGHT WRIST: ICD-10-CM

## 2024-04-01 DIAGNOSIS — G56.01 CARPAL TUNNEL SYNDROME OF RIGHT WRIST: ICD-10-CM

## 2024-04-01 DIAGNOSIS — Z00.00 ENCOUNTER FOR ANNUAL HEALTH EXAMINATION: Primary | ICD-10-CM

## 2024-04-01 DIAGNOSIS — I10 ESSENTIAL HYPERTENSION: ICD-10-CM

## 2024-04-01 DIAGNOSIS — M25.551 PAIN OF RIGHT HIP: ICD-10-CM

## 2024-04-01 DIAGNOSIS — Z95.5 HISTORY OF PLACEMENT OF STENT IN ANTERIOR DESCENDING BRANCH OF LEFT CORONARY ARTERY: ICD-10-CM

## 2024-04-01 DIAGNOSIS — R53.82 CHRONIC FATIGUE: ICD-10-CM

## 2024-04-01 DIAGNOSIS — Z95.5 PRESENCE OF DRUG COATED STENT IN LAD CORONARY ARTERY: ICD-10-CM

## 2024-04-01 DIAGNOSIS — M79.642 PAIN IN BOTH HANDS: ICD-10-CM

## 2024-04-01 DIAGNOSIS — Z23 NEED FOR VACCINATION: ICD-10-CM

## 2024-04-01 DIAGNOSIS — I25.10 ATHEROSCLEROSIS OF NATIVE CORONARY ARTERY OF NATIVE HEART WITHOUT ANGINA PECTORIS: ICD-10-CM

## 2024-04-01 PROBLEM — D64.9 ANEMIA: Status: RESOLVED | Noted: 2022-04-27 | Resolved: 2024-04-01

## 2024-04-01 LAB
BASOPHILS # BLD AUTO: 0.07 X10(3) UL (ref 0–0.2)
BASOPHILS NFR BLD AUTO: 1.1 %
COMPLEXED PSA SERPL-MCNC: 0.66 NG/ML (ref ?–4)
EOSINOPHIL # BLD AUTO: 0.1 X10(3) UL (ref 0–0.7)
EOSINOPHIL NFR BLD AUTO: 1.5 %
ERYTHROCYTE [DISTWIDTH] IN BLOOD BY AUTOMATED COUNT: 14.3 %
FOLATE SERPL-MCNC: 16.8 NG/ML (ref 8.7–?)
HCT VFR BLD AUTO: 41.4 %
HGB BLD-MCNC: 14.5 G/DL
IMM GRANULOCYTES # BLD AUTO: 0.04 X10(3) UL (ref 0–1)
IMM GRANULOCYTES NFR BLD: 0.6 %
LYMPHOCYTES # BLD AUTO: 1.37 X10(3) UL (ref 1–4)
LYMPHOCYTES NFR BLD AUTO: 20.8 %
MCH RBC QN AUTO: 34.4 PG (ref 26–34)
MCHC RBC AUTO-ENTMCNC: 35 G/DL (ref 31–37)
MCV RBC AUTO: 98.1 FL
MONOCYTES # BLD AUTO: 0.55 X10(3) UL (ref 0.1–1)
MONOCYTES NFR BLD AUTO: 8.3 %
NEUTROPHILS # BLD AUTO: 4.47 X10 (3) UL (ref 1.5–7.7)
NEUTROPHILS # BLD AUTO: 4.47 X10(3) UL (ref 1.5–7.7)
NEUTROPHILS NFR BLD AUTO: 67.7 %
PLATELET # BLD AUTO: 321 10(3)UL (ref 150–450)
RBC # BLD AUTO: 4.22 X10(6)UL
T PALLIDUM AB SER QL IA: NONREACTIVE
T4 FREE SERPL-MCNC: 0.8 NG/DL (ref 0.8–1.7)
TSI SER-ACNC: 1.25 MIU/ML (ref 0.36–3.74)
VIT B12 SERPL-MCNC: 704 PG/ML (ref 193–986)
WBC # BLD AUTO: 6.6 X10(3) UL (ref 4–11)

## 2024-04-01 PROCEDURE — 85025 COMPLETE CBC W/AUTO DIFF WBC: CPT | Performed by: FAMILY MEDICINE

## 2024-04-01 PROCEDURE — 82746 ASSAY OF FOLIC ACID SERUM: CPT | Performed by: FAMILY MEDICINE

## 2024-04-01 PROCEDURE — 86780 TREPONEMA PALLIDUM: CPT | Performed by: FAMILY MEDICINE

## 2024-04-01 PROCEDURE — 84439 ASSAY OF FREE THYROXINE: CPT | Performed by: FAMILY MEDICINE

## 2024-04-01 PROCEDURE — 84443 ASSAY THYROID STIM HORMONE: CPT | Performed by: FAMILY MEDICINE

## 2024-04-01 PROCEDURE — 82607 VITAMIN B-12: CPT | Performed by: FAMILY MEDICINE

## 2024-04-01 NOTE — PROGRESS NOTES
HPI:   Adam Villasenor is a 68 year old male who presents for a Medicare Initial Preventative Physical Exam (Welcome to Medicare- < 12 months on Medicare).    Right hip still bothers him. Will be seeing ortho.   Tongue (enlarging) - saw dentist, thought it was normal. Not getting worse.     Had a sore in the nose. Healing now. He had plucked nose hairs.     Wife is concerned about his cognitive ability over the past couple of years. She was showing him a computer game and he needed instructions repeated 30 seconds later. Will spend time making plans and discussing things and got a new thing and he forgot about it a week later. Has to tell him things several times before he gets it. Will be weekly at times when in a stretch, but comes and goes.     Toe surgery followup - doing well. Saw Dr. Trejo.   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 Seeing audiology.   Fatty liver status - last labs were good. Last US was 2017.   Hands hurt - both are bad, knuckles getting swollen. Hurts to open a door, can't open a can opener. Tried voltaren, TENS, stretches.   Carpal tunnel - right side just repaired in Dec. Still has limited ROM and weakness. Working on  strength.   Hip injection refresh - injection helped a lot, but noticed it twinging to come back 1 1/2 yrs later. Scheduled again.   Go off sertraline, can cause cognitive issues - already down to 25 mg for the past 6 months. Would like to stop.     Cardio: following with Dr. Benavides. No cardiac symptoms other than after he shoveled heavy snow this winter. It went away and hasn't happened again.      Sleep study last year showed FRANKLIN, cpap mask fitted at that time. On 9 cm H20. Doing well with cpap.     Had lack of motivation, sitting on couch. He was napping during the day. Stopped sertraline and no longer napping.       He has been screened for Falls and is High Risk. Fall Prevention information provided to patient in After Visit  Summary.    Do you feel unsteady when standing or walking?: Yes  Do you worry about falling?: No  Have you fallen in the past year?: No   He had a completely normal cognitive assessment - see flowsheet entries    Adam Villasenor has some abnormal functions as listed below:  He has Hearing problems based on screening of functional status.He has Vision problems based on screening of functional status. He has Walking problems based on screening of functional status. He has problems with Memory based on screening of functional status.       Depression Screening (PHQ-2/PHQ-9): Over the LAST 2 WEEKS   Little interest or pleasure in doing things: Not at all  Feeling down, depressed, or hopeless: Not at all  PHQ-2 SCORE: 0      Advanced Directive:  He does NOT have a Living Will on file in [facility].   Advance care planning including the explanation and discussion of advance directives standard forms performed Face to Face with patient and Family/surrogate (if present), and forms available to patient in AVS     He does NOT have a Power of  for Health Care on file in [facility].   Advance care planning including the explanation and discussion of advance directives standard forms performed Face to Face with patient and Family/surrogate (if present), and forms available to patient in AVS       He has never smoked tobacco.    CAGE screening score of 0 on 3/25/2024, showing low risk of alcohol abuse.        Patient Care Team: Patient Care Team:  Lucia Walls DO as PCP - General (Family Medicine)  Parish Solorzano MD as Consulting Physician (NEUROLOGY)  Camilo Benavides MD as Consulting Physician (CARDIOLOGY)  Hakan Lui MD as Consulting Physician (GASTROENTEROLOGY)  Amanda Castellanos PA as Consulting Physician (Physician Assistant)  Adrian Haque, She Damian, Watson Graves PT as Physical Therapist (Physical Therapy)    Patient Active Problem List   Diagnosis    History of placement of stent in  anterior descending branch of left coronary artery    Presence of drug coated stent in LAD coronary artery    Atherosclerotic heart disease of native coronary artery without angina pectoris    Essential hypertension    FRANKLIN (obstructive sleep apnea)    Hip pain    Arthralgia of right wrist    Chronic fatigue     Wt Readings from Last 3 Encounters:   04/01/24 190 lb (86.2 kg)   12/14/23 190 lb (86.2 kg)   12/08/23 190 lb (86.2 kg)      Last Cholesterol Labs:   Lab Results   Component Value Date    CHOLEST 140 11/21/2023    HDL 46 11/21/2023    LDL 78 11/21/2023    TRIG 80 11/21/2023          Last Chemistry Labs:   Lab Results   Component Value Date    AST 17 11/21/2023    ALT 48 11/21/2023    CA 9.0 11/21/2023    ALB 3.9 11/21/2023    TSH 1.640 04/27/2022    CREATSERUM 1.19 11/21/2023     (H) 11/21/2023        CBC  (most recent labs)   Lab Results   Component Value Date    WBC 7.2 11/21/2023    HGB 15.2 11/21/2023    .0 11/21/2023        ALLERGIES:   He has No Known Allergies.    CURRENT MEDICATIONS:   Outpatient Medications Marked as Taking for the 4/1/24 encounter (Office Visit) with Lucia Walls DO   Medication Sig    metoprolol succinate ER 25 MG Oral Tablet 24 Hr Take 1 tablet (25 mg total) by mouth daily.    atorvastatin 20 MG Oral Tab Take 1 tablet (20 mg total) by mouth daily.    omega-3 fatty acids 1000 MG Oral Cap Take 1,000 mg by mouth daily.    Ascorbic Acid (VITAMIN C) 1000 MG Oral Tab Take 1 tablet (1,000 mg total) by mouth daily.      MEDICAL INFORMATION:   He  has a past medical history of Atherosclerosis of coronary artery (11/16/2014), Coronary atherosclerosis, Deafness in left ear, Disorder of liver, Essential hypertension, Hearing impairment, High blood pressure, High cholesterol, Hyperlipidemia, Memory problem (12/07/2015), Sleep apnea, and Visual impairment.    He  has a past surgical history that includes other; colonoscopy (10/03/2016); colonoscopy (N/A, 10/03/2016); and cath  drug eluting stent.    His family history includes Cancer (age of onset: 82) in his father; Other in his paternal grandmother; Psychiatric in his son.   SOCIAL HISTORY:   He  reports that he has never smoked. He has never been exposed to tobacco smoke. He has never used smokeless tobacco. He reports current alcohol use of about 2.0 standard drinks of alcohol per week. He reports current drug use. Drug: Cannabis.     REVIEW OF SYSTEMS:   GENERAL: feels well otherwise  SKIN: denies any unusual skin lesions  EYES: denies blurred vision or double vision  HEENT: denies nasal congestion, sinus pain or ST  LUNGS: denies shortness of breath with exertion  CARDIOVASCULAR: denies chest pain on exertion  GI: denies abdominal pain, denies heartburn  : 0 per night nocturia, no complaint of urinary incontinence  MUSCULOSKELETAL: as above   NEURO: denies headaches  PSYCHE: denies depression or anxiety, see HPI   HEMATOLOGIC: denies hx of anemia  ENDOCRINE: denies thyroid history  ALL/ASTHMA: denies hx of allergy or asthma    EXAM:   /76   Pulse 82   Temp 97.6 °F (36.4 °C) (Temporal)   Resp 14   Ht 5' 9\" (1.753 m)   Wt 190 lb (86.2 kg)   SpO2 97%   BMI 28.06 kg/m²   Estimated body mass index is 28.06 kg/m² as calculated from the following:    Height as of this encounter: 5' 9\" (1.753 m).    Weight as of this encounter: 190 lb (86.2 kg).    Medicare Hearing Assessment  (Required for AWV/SWV)    Hearing Screening    Time taken: 4/1/2024  9:44 AM  Screening Method: Finger Rub  Finger Rub Result: Fail (Comment: Pt unable to hear out of [place])                 General Appearance:  Alert, cooperative, no distress, appears stated age   Head:  Normocephalic, without obvious abnormality, atraumatic   Eyes:  PERRL, conjunctiva/corneas clear, EOM's intact, both eyes   Ears:  Normal TM's and external ear canals, both ears   Nose: Nares normal, septum midline, mucosa normal, no drainage or sinus tenderness   Throat: Lips, mucosa,  and tongue normal; teeth and gums normal   Neck: Supple, symmetrical, trachea midline, no adenopathy, thyroid: not enlarged, symmetric, no tenderness/mass/nodules,     Back:   Symmetric, no curvature, ROM normal, no CVA tenderness   Lungs:   Clear to auscultation bilaterally, respirations unlabored   Chest Wall:  No tenderness or deformity   Heart:  Regular rate and rhythm, S1, S2 normal, no murmur, rub or gallop   Abdomen:   Soft, non-tender, bowel sounds active all four quadrants,  no masses, no organomegaly   Extremities: Extremities normal, atraumatic, no cyanosis or edema   Pulses: 2+ and symmetric   Skin: Skin color, texture, turgor normal, no rashes or lesions    Lymph nodes: Cervical, supraclavicular, and axillary nodes normal   Neurologic: Normal          Vaccination History     Immunization History   Administered Date(s) Administered    Covid-19 Vaccine Pfizer 30 mcg/0.3 ml 02/24/2021, 03/16/2021, 10/01/2021    Covid-19 Vaccine Pfizer Bivalent 30mcg/0.3mL 10/08/2022    Covid-19 Vaccine Pfizer Jemal-Sucrose 30 mcg/0.3 ml 06/13/2022    FLU VAC High Dose 65 YRS & Older PRSV Free (71170) 11/01/2023    FLU VAC QIV SPLIT 3 YRS AND OLDER (83611) 09/11/2016    Fluarix 6 Months And Older 0.5 ml prefilled syringe (42163) 09/22/2018    Flublok Quad Influenza Vaccine (70329) 12/20/2019    Fluvirin, 3 Years & >, Im 10/21/2014, 09/16/2015    HEP B, Adult 02/18/2020    HIGH DOSE FLU 65 YRS AND OLDER PRSV FREE SINGLE D (16221) FLU CLINIC 10/01/2021, 10/08/2022    Hep A, Adult 02/18/2020    Hepatitis A 06/21/2016    IPV 06/21/2016    Influenza 11/11/2013, 09/27/2017    Lyme Disease 03/08/2000, 05/11/2000, 04/24/2001    MMR 02/18/2020    Pfizer Covid-19 Vaccine 30mcg/0.3ml 12yrs+ (8817-8356) 11/01/2023    Pneumococcal Conjugate PCV20 04/01/2024    Pneumovax 23 04/19/2021    RSV, recombinant, RSVpreF, adjuvanted (Arexvy) 11/01/2023    TD 06/25/1997    TDAP 10/07/2008, 04/05/2010, 02/18/2020    Typhoid, Oral 06/21/2016     Zoster Vaccine Recombinant Adjuvanted (Shingrix) 09/18/2020        ASSESSMENT AND OTHER RELEVANT CHRONIC CONDITIONS:   Adam Villasenor is a 68 year old male who presents for a Medicare Assessment.     PLAN SUMMARY:   Diagnoses and all orders for this visit:    1. Encounter for annual health examination  Completed today.     2. Screening for malignant neoplasm of prostate  - PSA Total, Screen; Future  - PSA Total, Screen    3. Need for vaccination  - PCV20 (Prevnar 20)    4. Presence of drug coated stent in LAD coronary artery  Following with cardiology.     5. FRANKLIN (obstructive sleep apnea)  On cpap, doing well.     6. History of placement of stent in anterior descending branch of left coronary artery  Noted.     7. Pain of right hip  Getting worse, recommend seeing ortho again, may need replacement based on xrays from 2022.     8. Essential hypertension  Controlled with current meds. Continue same.     9. Chronic fatigue  Stable. Better off of SSRI.     10. Atherosclerosis of native coronary artery of native heart without angina pectoris  Noted, following with cardiology     11. Arthralgia of right wrist  Refer to OT to help with  strength.   - Occupational Therapy Referral - [facility]    12. Memory impairment  Check labs as ordered. If worsening, would refer to neurology or consider neuropsych testing.   - CBC With Differential With Platelet; Future  - Vitamin B12 [E]; Future  - Folic Acid Serum [E]; Future  - T Pallidum Screening Adams [E]; Future  - TSH and Free T4 [E]; Future  - VENIPUNCTURE  - CBC With Differential With Platelet  - Vitamin B12 [E]  - Folic Acid Serum [E]  - T Pallidum Screening Adams [E]  - TSH and Free T4 [E]    13. Pain in both hands  Refer to OT.   - Occupational Therapy Referral - [facility]    14. Carpal tunnel syndrome of right wrist  S/p surgery 3 months ago. Healing well, but still having some trouble with ROM and strength.   Will try OT and see if that helps.   -  Occupational Therapy Referral - [facility]    Diet assessment: good     PLAN:  The patient indicates understanding of these issues and agrees to the plan.  Lab work ordered.  Reinforced healthy diet, lifestyle, and exercise.    Return for Wellness Visit.     Lucia Walls DO, 4/19/2021     General Health     In the past six months, have you lost more than 10 pounds without trying?: 2 - No  Has your appetite been poor?: No  How does the patient maintain a good energy level?: Daily Walks  How would you describe your daily physical activity?: Light  How would you describe your current health state?: Good  How do you maintain positive mental well-being?: Social Interaction;Puzzles;Visiting Friends    This section provided for quick review of chart, separate sheet to patient  PREVENTATIVE SERVICES  INDICATIONS AND SCHEDULE Internal Lab or Procedure External Lab or Procedure   Diabetes Screening      HbgA1C   Annually HgbA1C (%)   Date Value   11/19/2015 5.6            No data to display                Fasting Blood Sugar (FSB)Annually Glucose (mg/dL)   Date Value   11/21/2023 103 (H)       Cardiovascular Disease Screening     LDL Annually LDL Cholesterol (mg/dL)   Date Value   11/21/2023 78        EKG - w/ Initial Preventative Physical Exam only, or if medically necessary Electrocardiogram date    Colorectal Cancer Screening      Colonoscopy Screen every 10 years Health Maintenance   Topic Date Due    Colorectal Cancer Screening  12/03/2024    Update Health Maintenance if applicable    Flex Sigmoidoscopy Screen every 10 years No results found for this or any previous visit.      No data to display                 Fecal Occult Blood Annually Occult Blood Result (no units)   Date Value   07/27/2016 Positive for Occult Blood (A)         No data to display                Glaucoma Screening      Ophthalmology Visit Annually: Diabetics, FHx Glaucoma, AA>50, > 65      No data to display                Prostate  Cancer Screening      PSA  Annually Health Maintenance   Topic Date Due    PSA  04/27/2024     Update Health Maintenance if applicable     Immunizations (Update Immunization Activity if applicable)     Influenza  Covered Annually 11/1/2023   Please get every year    Pneumococcal 13 (Prevnar)  Covered Once after 65 No vaccine history found Please get once after your 65th birthday    Pneumococcal 23 (Pneumovax)  Covered Once after 65 No vaccine history found Please get once after your 65th birthday    Hepatitis B for Moderate/High Risk 02/18/2020 Medium/high risk factors:   End-stage renal disease   Hemophiliacs who received Factor VIII or IX concentrates   Clients of institutions for the mentally retarded   Persons who live in the same house as a HepB virus carrier   Homosexual men   Illicit injectable drug abusers     Tetanus Toxoid  Only covered with a cut with metal- TD and TDaP Not covered by Medicare Part B) 06/25/1997 This may be covered with your prescription benefits, but Medicare does not cover unless Medically needed    Zoster   Not covered by Medicare Part B No vaccine history found This may be covered with your pharmacy  prescription benefits

## 2024-04-01 NOTE — PATIENT INSTRUCTIONS
[address] [facility] Adult  Hospitalist Group  History and Physical    Date of Service:  6/24/2022  Primary Care Provider: Hawa Moreno MD  Source of information: The patient and Chart review    Chief Complaint: No chief complaint on file. History of Presenting Illness:   Servando Granda is a 80 y.o. female who presents with slurred speech    History was primarily Obtained from the patient    Patient reports that last night she felt tired and weak, woke up this morning, had slurred speech, some confusion and was brought to the hospital apparently patient was brought by her granddaughter and they were concerned that patient was talking to her \"dead relative\"    Patient with history of ICA aneurysm, follows up with neurology, patient came in as a code stroke and was requested to be admitted to the hospitalist service. The patient denies any headache, blurry vision, sore throat, trouble swallowing, trouble with speech, chest pain, SOB, cough, fever, chills, N/V/D, abd pain, urinary symptoms, constipation, recent travels, sick contacts,, falls, injuries, rashes, contact with COVID-19 diagnosed patients, hematemesis, melena, hemoptysis, hematuria, rashes, denies starting any new medications and denies any other concerns or problems besides as mentioned above. REVIEW OF SYSTEMS:  A comprehensive review of systems was negative except for that written in the History of Present Illness. Past Medical History:   Diagnosis Date    CAD (coronary artery disease)     pulmonary HTN    Diabetes ([facility] [address]     Hypertension       Past Surgical History:   Procedure Laterality Date    HX KNEE REPLACEMENT Bilateral     HX ORTHOPAEDIC      IR INSERT TUNL CVC W/O PORT OVER 5 YR  3/5/2019     Prior to Admission medications    Medication Sig Start Date End Date Taking? Authorizing Provider   diclofenac (VOLTAREN) 1 % gel Apply  to affected area four (4) times daily.  4/24/22   KIANA Seaman Adam Villasenor's SCREENING SCHEDULE   Tests on this list are recommended by your physician but may not be covered, or covered at this frequency, by your insurer.   Please check with your insurance carrier before scheduling to verify coverage.   PREVENTATIVE SERVICES FREQUENCY &  COVERAGE DETAILS LAST COMPLETION DATE   Diabetes Screening    Fasting Blood Sugar / Glucose    One screening every 12 months if never tested or if previously tested but not diagnosed with pre-diabetes   One screening every 6 months if diagnosed with pre-diabetes Lab Results   Component Value Date     (H) 11/21/2023        Cardiovascular Disease Screening    Lipid Panel  Cholesterol  Lipoprotein (HDL)  Triglycerides Covered every 5 years for all Medicare beneficiaries without apparent signs or symptoms of cardiovascular disease Lab Results   Component Value Date    CHOLEST 140 11/21/2023    HDL 46 11/21/2023    LDL 78 11/21/2023    TRIG 80 11/21/2023         Electrocardiogram (EKG)   Covered if needed at Welcome to Medicare, and non-screening if indicated for medical reasons 11/21/2023      Ultrasound Screening for Abdominal Aortic Aneurysm (AAA) Covered once in a lifetime for one of the following risk factors   • Men who are 65-75 years old and have ever smoked   • Anyone with a family history -     Colorectal Cancer Screening  Covered for ages 50-85; only need ONE of the following:    Colonoscopy   Covered every 10 years    Covered every 2 years if patient is at high risk or previous colonoscopy was abnormal 12/03/2019    Health Maintenance   Topic Date Due   • Colorectal Cancer Screening  12/03/2024       Flexible Sigmoidoscopy   Covered every 4 years -    Fecal Occult Blood Test Covered annually -   Prostate Cancer Screening    Prostate-Specific Antigen (PSA) Annually No results found for: \"PSA\"  Health Maintenance   Topic Date Due   • PSA  04/27/2024      Immunizations    Influenza Covered once per flu season  Please get every  LORazepam (ATIVAN) 0.5 mg tablet 1 tab 30-40 min before the MRI, may repeat once, no driving. 11/5/21   Dionicio Dave DO   acetaminophen (TYLENOL) 325 mg tablet Take 2 Tabs by mouth every four (4) hours as needed for Pain. 1/22/21   Jose Alvarez NP   apixaban (Eliquis) 2.5 mg tablet Take 2.5 mg by mouth two (2) times a day. Provider, Historical   metFORMIN (GLUCOPHAGE) 500 mg tablet Take  by mouth two (2) times daily (with meals). Unknown dosage    Provider, Historical   aspirin delayed-release 81 mg tablet Take 81 mg by mouth daily. Provider, Historical   carvedilol (COREG) 6.25 mg tablet Take 1 Tab by mouth two (2) times daily (with meals). 3/11/19   Mylene Oh MD   bisacodyl (DULCOLAX) 5 mg EC tablet Take 1 Tab by mouth daily as needed for Constipation. 3/11/19   Mylene Oh MD   budesonide (PULMICORT) 0.5 mg/2 mL nbsp 2 mL by Nebulization route two (2) times a day. 3/11/19   Mylene Oh MD   albuterol (PROVENTIL VENTOLIN) 2.5 mg /3 mL (0.083 %) nebulizer solution 3 mL by Nebulization route every four (4) hours as needed for Wheezing. 3/11/19   Mylene Oh MD   rosuvastatin (CRESTOR) 20 mg tablet Take 1 Tab by mouth nightly. 3/11/19   Mylene Oh MD   oxybutynin chloride XL (DITROPAN XL) 15 mg CR tablet Take 15 mg by mouth daily. Provider, Historical   therapeutic multivitamin (THERAGRAN) tablet Take 1 Tab by mouth daily. Provider, Historical   venlafaxine (EFFEXOR) 37.5 mg tablet Take 37.5 mg by mouth two (2) times a day. Provider, Historical   calcitRIOL (ROCALTROL) 0.5 mcg capsule Take 0.5 mcg by mouth daily. Provider, Historical     No Known Allergies   No family history on file. Social History:  reports that she has never smoked. She has never used smokeless tobacco. She reports that she does not drink alcohol and does not use drugs.      Family and social history were personally reviewed, all pertinent and relevant details are outlined as year 11/01/2023  No recommendations at this time    Pneumococcal Each vaccine (Rjkueic50 & [id number]) covered once after 65 Prevnar 13: -    [id number]: 04/19/2021     Pneumococcal Vaccination(2 of 2 - PCV) due on 04/19/2022    Hepatitis B One screening covered for patients with certain risk factors   02/18/2020  No recommendations at this time    Tetanus Toxoid Not covered by Medicare Part B unless medically necessary (cut with metal); may be covered with your pharmacy prescription benefits 06/25/1997    Tetanus, Diptheria and Pertusis TD and TDaP Not covered by Medicare Part B -  No recommendations at this time    Zoster Not covered by Medicare Part B; may be covered with your pharmacy  prescription benefits -  Zoster Vaccines(2 of 2) due on 11/13/2020       above.    Objective:     Visit Vitals  BP (!) 188/79   Pulse 70   Resp 20   SpO2 97%           PHYSICAL EXAM:   General: Alert, awake,  HEENT: PEERL, EOMI, moist mucus membranes  Neck: Supple, no JVD, no meningeal signs  Chest: Clear to auscultation bilaterally   CVS: RRR, S1 S2 heard, no murmurs/rubs/gallops  Abd: Soft, non-tender, non-distended, +bowel sounds   Ext: No clubbing, no cyanosis, no edema  Neuro/Psych: Pleasant mood and affect, CN 2-12 grossly intact, sensory grossly within normal limit, Strength 5/5 in all extremities, DTR 1+ x 4  Cap refill: Brisk, less than 3 seconds  Pulses: 2+, symmetric in all extremities  Skin: Warm, dry, without rashes or lesions    Data Review: All diagnostic labs and studies have been reviewed.     Abnormal Labs Reviewed   CBC WITH AUTOMATED DIFF - Abnormal; Notable for the following components:       Result Value    HGB 10.7 (*)     HCT 33.8 (*)     RDW 14.6 (*)     All other components within normal limits   METABOLIC PANEL, COMPREHENSIVE - Abnormal; Notable for the following components:    Anion gap 3 (*)     Glucose 171 (*)     BUN 31 (*)     Creatinine 1.53 (*)     GFR est AA 39 (*)     GFR est non-AA 32 (*)     Calcium 10.4 (*)     Albumin 3.4 (*)     Globulin 4.3 (*)     A-G Ratio 0.8 (*)     All other components within normal limits   PROTHROMBIN TIME + INR - Abnormal; Notable for the following components:    Prothrombin time 11.8 (*)     All other components within normal limits   GLUCOSE, POC - Abnormal; Notable for the following components:    Glucose (POC) 164 (*)     All other components within normal limits       All Micro Results     None          IMAGING:   XR CHEST PORT   Final Result   No infiltrate or overt CHF      CT CODE NEURO HEAD WO CONTRAST   Final Result   No acute intracranial process identified on noncontrast CT      CTA HEAD NECK W CONT    (Results Pending)   MRI BRAIN WO CONT    (Results Pending)        ECG/ECHO:    Results for orders placed or performed during the hospital encounter of 06/24/22   EKG, 12 LEAD, INITIAL   Result Value Ref Range    Ventricular Rate 67 BPM    Atrial Rate 67 BPM    P-R Interval 254 ms    QRS Duration 74 ms    Q-T Interval 402 ms    QTC Calculation (Bezet) 424 ms    Calculated P Axis 82 degrees    Calculated R Axis 34 degrees    Calculated T Axis 12 degrees    Diagnosis       Sinus rhythm with 1st degree AV block  Otherwise normal ECG  When compared with ECG of 22-JAN-2021 04:01,  TN interval has increased          Assessment:   Given the patient's current clinical presentation, there is a high level of concern for decompensation if discharged from the emergency department. Complex decision making was performed, which includes reviewing the patient's available past medical records, laboratory results, and imaging studies. Dysarthria  CKD 3  Diabetes mellitus type 2 with hyperglycemia  Accelerated hypertension  Plan:   Patient will be admitted on a telemetry bed,  Rule out CVA, patient on Eliquis, aspirin and statin, continue all, MRI of the brain, neurovascular checks, telemetry monitoring, echocardiogram, neurology consult, further intervention per hospital course, continue to monitor  Avoid nephrotoxic medication, renally dose all medication, trend creatinine and continue monitor  Sliding-scale insulin, Accu-Cheks, diet control, close monitoring  Hydralazine as needed        DIET: ADULT DIET Regular; 4 carb choices (60 gm/meal); Low Fat/Low Chol/High Fiber/FELIX; Low Sodium (2 gm)   ISOLATION PRECAUTIONS: There are currently no Active Isolations  CODE STATUS: Full Code   DVT PROPHYLAXIS: SCDs  FUNCTIONAL STATUS PRIOR TO HOSPITALIZATION: Ambulatory and capable of self-care but relies on assistive devices (rolling walker/cane). EARLY MOBILITY ASSESSMENT: Recommend a consult to the Mobility Team  ANTICIPATED DISCHARGE: 24-48 hours.     Signed By: Judson Pretty MD     June 24, 2022         Please note that this dictation may have been completed with Dragon, the computer voice recognition software. Quite often unanticipated grammatical, syntax, homophones, and other interpretive errors are inadvertently transcribed by the computer software. Please disregard these errors. Please excuse any errors that have escaped final proofreading.

## 2024-04-16 ENCOUNTER — OFFICE VISIT (OUTPATIENT)
Dept: FAMILY MEDICINE CLINIC | Facility: CLINIC | Age: 69
End: 2024-04-16
Payer: MEDICARE

## 2024-04-16 VITALS
BODY MASS INDEX: 28.44 KG/M2 | OXYGEN SATURATION: 96 % | WEIGHT: 192 LBS | SYSTOLIC BLOOD PRESSURE: 114 MMHG | HEIGHT: 69 IN | TEMPERATURE: 98 F | DIASTOLIC BLOOD PRESSURE: 76 MMHG | RESPIRATION RATE: 18 BRPM | HEART RATE: 74 BPM

## 2024-04-16 DIAGNOSIS — J02.9 SORE THROAT: ICD-10-CM

## 2024-04-16 DIAGNOSIS — Z11.52 ENCOUNTER FOR SCREENING FOR COVID-19: Primary | ICD-10-CM

## 2024-04-16 LAB
CONTROL LINE PRESENT WITH A CLEAR BACKGROUND (YES/NO): YES YES/NO
KIT LOT #: NORMAL NUMERIC
OPERATOR ID: NORMAL
POCT LOT NUMBER: NORMAL
RAPID SARS-COV-2 BY PCR: NOT DETECTED

## 2024-04-16 PROCEDURE — 87880 STREP A ASSAY W/OPTIC: CPT | Performed by: PHYSICIAN ASSISTANT

## 2024-04-16 PROCEDURE — 99213 OFFICE O/P EST LOW 20 MIN: CPT | Performed by: PHYSICIAN ASSISTANT

## 2024-04-16 PROCEDURE — U0002 COVID-19 LAB TEST NON-CDC: HCPCS | Performed by: PHYSICIAN ASSISTANT

## 2024-04-16 RX ORDER — BENZONATATE 200 MG/1
200 CAPSULE ORAL 3 TIMES DAILY PRN
Qty: 30 CAPSULE | Refills: 0 | Status: SHIPPED | OUTPATIENT
Start: 2024-04-16

## 2024-04-16 NOTE — PROGRESS NOTES
CHIEF COMPLAINT:     Chief Complaint   Patient presents with    Cough     Cough, phlegm, and sore throat . Symptoms started on Saturday   Recent travel   OTC: aleve  Negative at home covid test ( yesterday )          HPI:   Adam Villasenor is a 69 year old male who presents with cough, congestion, sore throat for 3 days.       Associated symptoms:    Fever/Chills  No  Sore throat  Yes  Cough  Yes   Congestion Yes  Bodyache  No  Headache  No  Chest pain No  SOB/Dyspnea No  Loss of taste No  Loss of smell No  Diarrhea No  Vomiting No    Covid vaccinations x 6    Did have influenza vaccination for this season.     Just traveled to [place].  No definitive strep, covid or influenza exposures.        Current Outpatient Medications   Medication Sig Dispense Refill    benzonatate 200 MG Oral Cap Take 1 capsule (200 mg total) by mouth 3 (three) times daily as needed for cough. 30 capsule 0    metoprolol succinate ER 25 MG Oral Tablet 24 Hr Take 1 tablet (25 mg total) by mouth daily. 90 tablet 0    atorvastatin 20 MG Oral Tab Take 1 tablet (20 mg total) by mouth daily. 90 tablet 3    omega-3 fatty acids 1000 MG Oral Cap Take 1,000 mg by mouth daily.      Ascorbic Acid (VITAMIN C) 1000 MG Oral Tab Take 1 tablet (1,000 mg total) by mouth daily.      ipratropium 0.06 % Nasal Solution 2 sprays by Nasal route in the morning and 2 sprays before bedtime. 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    Disorder of liver    NAFLD    Essential hypertension    Hearing impairment    Deaf in left ear    High blood pressure    High cholesterol    Hyperlipidemia    Memory problem    Sleep apnea    CPAP    Visual impairment    Glasses      Social History:  Social History     Socioeconomic History    Marital status:    Tobacco Use    Smoking status: Never     Passive exposure: Never     Smokeless tobacco: Never   Vaping Use    Vaping status: Never Used   Substance and Sexual Activity    Alcohol use: Yes     Alcohol/week: 2.0 standard drinks of alcohol     Types: 2 Cans of beer per week     Comment: 2 drinks per week    Drug use: Yes     Types: Cannabis     Comment: Approximately once per month   Other Topics Concern    Caffeine Concern Yes     Comment: 2-3 cups a day    Exercise Yes     Comment: 1 x a week     Social Determinants of Health     Physical Activity: Inactive (11/20/2023)    Received from [facility]    Exercise Vital Sign     Days of Exercise per Week: 0 days     Minutes of Exercise per Session: 0 min        Review of Systems:    Positive for stated complaint: cough, congestion.   Pertinent positives and negatives noted in the the HPI.    EXAM:   /76   Pulse 74   Temp 98.1 °F (36.7 °C)   Resp 18   Ht 5' 9\" (1.753 m)   Wt 192 lb (87.1 kg)   SpO2 96%   BMI 28.35 kg/m²   GENERAL: well developed, well nourished,in no apparent distress  SKIN: no rashes,no suspicious lesions  HEAD: atraumatic, normocephalic  EYES: conjunctiva clear, sclera white,  PERRLA  EARS: TM's non erythematous  NOSE: nares patent, mucosa mild congestion  THROAT: Posterior pharynx is  erythematous, no exudates  NECK: supple, non-tender  LUNGS: clear to auscultation bilaterally without rale, ronchi, wheeze.  CARDIO: S1/S2 without murmur  GI: BS's present x4. No palpable masses or organomegaly.  no tenderness on palpation.  EXTREMITIES: no cyanosis, clubbing or edema  LYMPH:  no gross lymphadenopathy.      Recent Results (from the past 24 hour(s))   Strep A Assay W/Optic    Collection Time: 04/16/24  9:25 AM   Result Value Ref Range    Strep Grp A Screen neg Negative    Control Line Present with a clear background (yes/no) yes Yes/No    Kit Lot # [id number] Numeric    Kit Expiration Date 3-1,025 Date   COVID-19 LAB TEST NON-CDC    Collection Time: 04/16/24  9:31 AM    Specimen: Nares   Result Value  Ref Range    Rapid SARS-CoV-2 by PCR Not Detected Not Detected    POCT Lot Number [id number]     POCT Expiration Date 08-28-25     POCT Procedure Control Control Valid Control Valid     ,710          ASSESSMENT AND PLAN:   Adam Villasenor is a 69 year old male who presents with Cough (Cough, phlegm, and sore throat . Symptoms started on Saturday /Recent travel /OTC: aleve/Negative at home covid test ( yesterday ) /). Symptoms are consistent with:      ASSESSMENT:  Encounter Diagnoses   Name Primary?    Encounter for screening for COVID-19 Yes    Sore throat          PLAN: Covid Test Rapid ID Now is negative.    RSS is negative.       Symptomatic care:   1. Rest. Drink plenty of fluids.  2. Tylenol or ibuprofen for discomfort or fever.   3. OTC decongestant (phenylephrine) expectorants (guaifenesin), nasal steroid sprays  (fluticasone) may be helpful        for congestion.  4. OTC cough suppressant for cough  (dextromethorphan)  5. Chloraseptic spray/throat lozenges for sore throat   6 tessalon as written.        Go to the ED for evaluation with progressive symptoms of difficulty swallowing, breathing, shortness of breath, chest pain, extreme weakness, or confusion.         Meds & Refills for this Visit:  Requested Prescriptions     Signed Prescriptions Disp Refills    benzonatate 200 MG Oral Cap 30 capsule 0     Sig: Take 1 capsule (200 mg total) by mouth 3 (three) times daily as needed for cough.       Risks, benefits, side effects of medication addressed and explained.    There are no Patient Instructions on file for this visit.    The patient indicates understanding of these issues and agrees to the plan.  The patient is asked to follow up PCP

## 2024-04-19 ENCOUNTER — HOSPITAL ENCOUNTER (OUTPATIENT)
Age: 69
Discharge: HOME | End: 2024-04-19
Payer: MEDICARE

## 2024-04-19 ENCOUNTER — APPOINTMENT (OUTPATIENT)
Dept: GENERAL RADIOLOGY | Age: 69
End: 2024-04-19
Attending: NURSE PRACTITIONER
Payer: MEDICARE

## 2024-04-19 VITALS
BODY MASS INDEX: 28.14 KG/M2 | HEART RATE: 73 BPM | WEIGHT: 190 LBS | DIASTOLIC BLOOD PRESSURE: 72 MMHG | SYSTOLIC BLOOD PRESSURE: 124 MMHG | OXYGEN SATURATION: 96 % | HEIGHT: 69 IN | RESPIRATION RATE: 18 BRPM | TEMPERATURE: 97 F

## 2024-04-19 DIAGNOSIS — R05.1 ACUTE COUGH: Primary | ICD-10-CM

## 2024-04-19 PROCEDURE — 71046 X-RAY EXAM CHEST 2 VIEWS: CPT | Performed by: NURSE PRACTITIONER

## 2024-04-19 PROCEDURE — 99203 OFFICE O/P NEW LOW 30 MIN: CPT | Performed by: NURSE PRACTITIONER

## 2024-04-19 RX ORDER — PREDNISONE 20 MG/1
20 TABLET ORAL 2 TIMES DAILY
Qty: 10 TABLET | Refills: 0 | Status: SHIPPED | OUTPATIENT
Start: 2024-04-19 | End: 2024-04-24

## 2024-04-19 RX ORDER — ALBUTEROL SULFATE 90 UG/1
2 AEROSOL, METERED RESPIRATORY (INHALATION) EVERY 4 HOURS PRN
Qty: 1 EACH | Refills: 0 | Status: SHIPPED | OUTPATIENT
Start: 2024-04-19 | End: 2024-05-19

## 2024-04-19 RX ORDER — DEXTROMETHORPHAN HYDROBROMIDE AND PROMETHAZINE HYDROCHLORIDE 15; 6.25 MG/5ML; MG/5ML
5 SYRUP ORAL NIGHTLY
Qty: 35 ML | Refills: 0 | Status: SHIPPED | OUTPATIENT
Start: 2024-04-19 | End: 2024-04-26

## 2024-04-19 NOTE — ED INITIAL ASSESSMENT (HPI)
Cough since Sunday. Pt seen at [facility] on 4/16 and prescribed benzonatate. Pt states it is not helping with the cough. No fevers. Feels sob with cough.   10

## 2024-04-19 NOTE — DISCHARGE INSTRUCTIONS
Primary care provider for all of your healthcare needs  Take the promethazine at night  Take the steroids twice a day for 5 days  Use inhaler as needed  Continue the Pentony pills June the day  Return to the emergency room if any worse symptoms or concerns.

## 2024-04-19 NOTE — ED PROVIDER NOTES
Patient Seen in: [facility]      History   No chief complaint on file.    Stated Complaint: cough, congestion    Subjective:   HPI    69-year-old male presents to the immediate care with complaints of cough and congestion was seen at the work clinic 3 days ago as his cough is getting worse.  Denies any shortness of breath denies any difficulty in breathing.  Patient has no other issues complaints or concerns.  The patient's medication list, past medical history and social history elements as listed in today's nurse's notes were reviewed and agreed (except as otherwise stated in the HPI).  The patient's family history reviewed and determined to be noncontributory to the presenting problem.      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    Disorder of liver    NAFLD    Essential hypertension    Hearing impairment    Deaf in left ear    High blood pressure    High cholesterol    Hyperlipidemia    Memory problem    Sleep apnea    CPAP    Visual impairment    Glasses              Past Surgical History:   Procedure Laterality Date    Cath drug eluting stent      Colonoscopy  10/03/2016    normal, repeat in 3 yrs due to h/o polyps and family h/o colon cancer    Colonoscopy N/A 10/03/2016    Procedure: COLONOSCOPY;  Surgeon: Hakan Lui MD;  Location:  ENDOSCOPY    Other      Heart stent                Social History     Socioeconomic History    Marital status:    Tobacco Use    Smoking status: Never     Passive exposure: Never    Smokeless tobacco: Never   Vaping Use    Vaping status: Never Used   Substance and Sexual Activity    Alcohol use: Yes     Alcohol/week: 2.0 standard drinks of alcohol     Types: 2 Cans of beer per week     Comment: 2 drinks per week    Drug use: Yes     Types: Cannabis     Comment: Approximately once per month   Other Topics Concern    Caffeine Concern  Yes     Comment: 2-3 cups a day    Exercise Yes     Comment: 1 x a week     Social Determinants of Health     Physical Activity: Inactive (11/20/2023)    Received from [facility]    Exercise Vital Sign     Days of Exercise per Week: 0 days     Minutes of Exercise per Session: 0 min              Review of Systems   HENT:  Positive for congestion.    Respiratory:  Positive for cough.    All other systems reviewed and are negative.      Positive for stated complaint: cough, congestion  Other systems are as noted in HPI.  Constitutional and vital signs reviewed.      All other systems reviewed and negative except as noted above.    Physical Exam     ED Triage Vitals [04/19/24 1013]   /72   Pulse 73   Resp 18   Temp 97.4 °F (36.3 °C)   Temp src Temporal   SpO2 96 %   O2 Device None (Room air)       Current:/72   Pulse 73   Temp 97.4 °F (36.3 °C) (Temporal)   Resp 18   Ht 175.3 cm (5' 9\")   Wt 86.2 kg   SpO2 96%   BMI 28.06 kg/m²         Physical Exam  GENERAL: The patient is well-developed well-nourished nontoxic, non-ill-appearing  HEENT: Normocephalic.  Atraumatic.  Extraocular motions are intact  NECK: Supple.  No meningitic signs are noted.   CHEST/LUNGS: Clear to auscultation.  There is no respiratory distress noted.  HEART/CARDIOVASCULAR: Regular.  There is no tachycardia.   SKIN: There is no rash.  NEURO: The patient is awake, alert, and oriented.  The patient is cooperative.      ED Course   Labs Reviewed - No data to display  XR CHEST PA + LAT CHEST (CPT=71046)    Result Date: 4/19/2024  PROCEDURE:  XR CHEST PA + LAT CHEST (CPT=71046)  INDICATIONS:  cough, congestion  COMPARISON:  [facility], XR, XR CHEST PA + LAT CHEST (CPT=71020), 7/27/2016, 11:46 AM.  TECHNIQUE:  PA and lateral chest radiographs were obtained.  PATIENT STATED HISTORY: (As transcribed by Technologist)  The patient has a cough and congestion.    FINDINGS:  Cardiac silhouette and pulmonary  vasculature within normal limits. No focal consolidation, pneumothorax or pleural effusion.            CONCLUSION:  No focal consolidation.   LOCATION:  [facility]   Dictated by (CST): Jenelle Puente MD on 4/19/2024 at 11:38 AM     Finalized by (CST): Jenelle Puente MD on 4/19/2024 at 11:39 AM             [facility]   .  Patient present with signs and symptoms consistent with upper respiratory infection and consider possible life-threatening etiologies are presenting complaint including severe bacterial infection, meningitis, acute cardiopulmonary process etc.  And doubt given; history, exam, chest x-ray with no acute cardiopulmonary process  and signs and symptoms which have markedly improved with management.  Suspect likely viral etiology of upper respiratory infection.  Patient afebrile with normal vital signs and patient nontoxic appearing, well-hydrated in no apparent distress and no respiratory distress, this will discharge to follow-up with primary care physician in 2-3 days.  Supportive care instructions provided.  Patient to take over-the-counter and Tylenol and Motrin as needed for fever and pain.  Advised to return to the emergency room if any new or worsening symptoms including but not limited to; change in mental status.  Meningeal signs, abdominal pain, nausea vomiting, chest pain/shortness of breath, new rash, decreased urinary output or any other concerns.    Please note that this report has been produced using speech recognition software and may contain errors related to that system including, but not limited to, errors in grammar, punctuation, and spelling, as well as words and phrases that possibly may have been recognized inappropriately.  If there are any questions or concerns, contact the dictating provider for clarification.                                 Medical Decision Making      Disposition and Plan     Clinical Impression:  1. Acute cough         Disposition:  Discharge  4/19/2024 11:59  am    Follow-up:  Lucia Walls, DO  [address]  [facility] [address]  [phone]                Medications Prescribed:  Discharge Medication List as of 4/19/2024 12:00 PM        START taking these medications    Details   promethazine-dextromethorphan 6.25-15 MG/5ML Oral Syrup Take 5 mL by mouth nightly for 7 days., Normal, Disp-35 mL, R-0      albuterol 108 (90 Base) MCG/ACT Inhalation Aero Soln Inhale 2 puffs into the lungs every 4 (four) hours as needed for Wheezing., Normal, Disp-1 each, R-0      predniSONE 20 MG Oral Tab Take 1 tablet (20 mg total) by mouth 2 (two) times daily for 5 days., Normal, Disp-10 tablet, R-0

## 2024-04-25 RX ORDER — METOPROLOL SUCCINATE 25 MG/1
25 TABLET, EXTENDED RELEASE ORAL DAILY
Qty: 90 TABLET | Refills: 0 | Status: SHIPPED | OUTPATIENT
Start: 2024-04-25

## 2024-04-25 NOTE — TELEPHONE ENCOUNTER
Hypertension Medications Protocol [id number]/25/2024 10:12 AM   Protocol Details CMP or BMP in past 12 months    Last BP reading less than 140/90    In person appointment or virtual visit in the past 12 mos or appointment in next 3 mos    EGFRCR or GFRNAA > 50       LOV 4/1/24     Last Refill   metoprolol succinate ER 25 MG Oral Tablet 24 Hr 90 tablet 0 12/4/2023       Last BP /76     Labs 4/1/23     Letter mailed to patient reminding them they have outstanding orders.

## 2024-04-30 ENCOUNTER — TELEPHONE (OUTPATIENT)
Facility: CLINIC | Age: 69
End: 2024-04-30

## 2024-04-30 DIAGNOSIS — M25.551 RIGHT HIP PAIN: Primary | ICD-10-CM

## 2024-05-01 ENCOUNTER — OFFICE VISIT (OUTPATIENT)
Dept: ORTHOPEDICS CLINIC | Facility: CLINIC | Age: 69
End: 2024-05-01
Payer: MEDICARE

## 2024-05-01 ENCOUNTER — HOSPITAL ENCOUNTER (OUTPATIENT)
Dept: GENERAL RADIOLOGY | Age: 69
Discharge: HOME | End: 2024-05-01
Attending: ORTHOPAEDIC SURGERY
Payer: MEDICARE

## 2024-05-01 VITALS — WEIGHT: 188.63 LBS | HEIGHT: 68.5 IN | BODY MASS INDEX: 28.26 KG/M2

## 2024-05-01 DIAGNOSIS — M16.11 PRIMARY OSTEOARTHRITIS OF RIGHT HIP: Primary | ICD-10-CM

## 2024-05-01 DIAGNOSIS — M25.551 RIGHT HIP PAIN: ICD-10-CM

## 2024-05-01 PROCEDURE — 73502 X-RAY EXAM HIP UNI 2-3 VIEWS: CPT | Performed by: ORTHOPAEDIC SURGERY

## 2024-05-01 PROCEDURE — 20611 DRAIN/INJ JOINT/BURSA W/US: CPT | Performed by: ORTHOPAEDIC SURGERY

## 2024-05-01 PROCEDURE — 99214 OFFICE O/P EST MOD 30 MIN: CPT | Performed by: ORTHOPAEDIC SURGERY

## 2024-05-01 RX ORDER — TRIAMCINOLONE ACETONIDE 40 MG/ML
40 INJECTION, SUSPENSION INTRA-ARTICULAR; INTRAMUSCULAR ONCE
Status: COMPLETED | OUTPATIENT
Start: 2024-05-01 | End: 2024-05-01

## 2024-05-01 RX ADMIN — TRIAMCINOLONE ACETONIDE 40 MG: 40 INJECTION, SUSPENSION INTRA-ARTICULAR; INTRAMUSCULAR at 09:29:00

## 2024-05-01 NOTE — PROGRESS NOTES
[facility] Progress Note    Subjective: 69-year-old male returns for management of right hip pain/hip arthritis.  He states that the last injection provided 2 years ago provided great relief for a year or 2.  Symptoms of come back with pain in the buttocks on the right and have gotten significantly worse than what they were even recently.  He is going on a vacation to [place] and needs to be able to walk and would like to repeat injection today.  He does have some questions about hip replacement surgery as well.    Objective: Patient appears comfortable, nonantalgic gait.      Imaging: Updated x-rays of the right hip and pelvis obtained today personally viewed, independently interpreted and radiology report read.  Compared to films from 2 years ago June 2022, this does show slight progression of osteoarthritis with complete loss of superior joint space/bone-on-bone, subchondral sclerosis at the superior femoral head and acetabulum.      Assessment/Plan: 69-year-old male with symptomatic radiographically moderate to severe right hip osteoarthritis.  Revisited the discussion of treatment options including nonsurgical and surgical.  He did have some questions about surgery today which were answered to verbal satisfaction.  I described the process, expectations and timeframe for recovery with hip replacement surgery.  He has a trip coming up and had excellent relief from a hip injection, and would like to repeat the injection today.  This was performed in clinic.  Advised this can be repeated as often as every 3 months as needed, or he may contact us if less relief and he would like to consider surgery.  Advised to note how much relief he gets from the hip injection of his buttock pain, and how long it takes to kick in.    Gagan Vera MD, [facility]  Phone [phone]  Fax [phone]

## 2024-05-01 NOTE — PROCEDURES
Risks and benefits of hip injection discussed with the patient, with risks including but not limited to pain and swelling at the injection site and/or within the hip joint, infection, elevation in blood pressure and/or glucose levels, facial flushing. After informed consent, the patient's right hip was marked, prepped with topical antiseptic, and locally anesthetized with skin refrigerant followed by injection of 1% lidocaine to create a skin wheal anteriorly at an insertion site a few fingerbreadths lateral to the femoral pulse, along the trajectory of the femoral neck.  Next, the area was re-prepped with topical antiseptic, and ultrasound guidance was performed to direct a 20 gauge spinal needle into the hip joint at the junction of the femoral head and neck, after which a mixture of 1mL 40mg/mL Kenalog, 2mL 1% lidocaine and 2mL 0.5% marcaine was injected while visualizing the fluid under ultrasound.  Confirmatory imaging was saved to the patient's chart.  A band-aid was applied.  The patient tolerated the procedure well.    Gagan Vera MD, [facility]  [facility]  Phone [phone]  Fax [phone]

## 2024-05-06 ENCOUNTER — PATIENT MESSAGE (OUTPATIENT)
Dept: FAMILY MEDICINE CLINIC | Facility: CLINIC | Age: 69
End: 2024-05-06

## 2024-05-06 DIAGNOSIS — G47.33 OSA (OBSTRUCTIVE SLEEP APNEA): Primary | ICD-10-CM

## 2024-05-07 NOTE — TELEPHONE ENCOUNTER
From: Adam Villasenor  To: Luciasusie Walls  Sent: 5/6/2024 7:31 PM CDT  Subject: Letter I can carry stating my cpap is a medical necessity     Brigid and I are going abroad, could I get a letter to carry with me stating that my cpap machine is a medical necessity.   In [place] I have no problem carrying in on flights, but I have read some issues in [place].   I could come by the office and pick it up. We leave May 11.

## 2024-05-08 ENCOUNTER — TELEPHONE (OUTPATIENT)
Dept: FAMILY MEDICINE CLINIC | Facility: CLINIC | Age: 69
End: 2024-05-08

## 2024-05-08 NOTE — TELEPHONE ENCOUNTER
Patient's name and  verified   Future Appointments   Date Time Provider Department Center   2024  9:30 AM Lucia Walls DO [facility]       Patient notified and verbalized an understanding

## 2024-05-08 NOTE — TELEPHONE ENCOUNTER
----- Message from [employer]  sent at 5/7/2024  2:50 PM CDT -----  Regarding: Bullsushmae next time bug bite  Contact: [phone]  I found this bullseye under my clothes next to a bug bite. Should we pursue antibiotics?  JEAN CARLOS dixon for the letter.

## 2024-05-09 ENCOUNTER — OFFICE VISIT (OUTPATIENT)
Dept: FAMILY MEDICINE CLINIC | Facility: CLINIC | Age: 69
End: 2024-05-09
Payer: MEDICARE

## 2024-05-09 VITALS
DIASTOLIC BLOOD PRESSURE: 64 MMHG | BODY MASS INDEX: 27.57 KG/M2 | RESPIRATION RATE: 18 BRPM | WEIGHT: 184 LBS | SYSTOLIC BLOOD PRESSURE: 112 MMHG | HEART RATE: 67 BPM | TEMPERATURE: 96 F | HEIGHT: 68.5 IN | OXYGEN SATURATION: 99 %

## 2024-05-09 DIAGNOSIS — M79.642 PAIN IN BOTH HANDS: ICD-10-CM

## 2024-05-09 DIAGNOSIS — A69.20 ERYTHEMA MIGRANS (LYME DISEASE): Primary | ICD-10-CM

## 2024-05-09 DIAGNOSIS — M79.641 PAIN IN BOTH HANDS: ICD-10-CM

## 2024-05-09 PROCEDURE — 99214 OFFICE O/P EST MOD 30 MIN: CPT | Performed by: FAMILY MEDICINE

## 2024-05-09 RX ORDER — DOXYCYCLINE HYCLATE 100 MG/1
100 CAPSULE ORAL 2 TIMES DAILY
Qty: 20 CAPSULE | Refills: 0 | Status: SHIPPED | OUTPATIENT
Start: 2024-05-09 | End: 2024-05-19

## 2024-05-09 NOTE — PROGRESS NOTES
Adam Villasenor is a 69 year old male.  Chief Complaint   Patient presents with    Bite Sting,Insect     Possible bug bite c/o red itchy ilir on right upper thigh x 4 days        HPI:   Bug bite with bullseye rash on thigh x 4 days.   Was walking in the woods a lot. He is out foraging for mushrooms all the time.  No tick was seen.   Feeling fine. Not achy, no headaches. No fevers.     Hands are better, wants to hold off on OT.     Traveling to [place] this weekend for 2 weeks.     ALLERGIES:  No Known Allergies      Current Outpatient Medications   Medication Sig Dispense Refill    doxycycline 100 MG Oral Cap Take 1 capsule (100 mg total) by mouth 2 (two) times daily for 10 days. 20 capsule 0    METOPROLOL SUCCINATE ER 25 MG Oral Tablet 24 Hr Take 1 tablet (25 mg total) by mouth daily. 90 tablet 0    atorvastatin 20 MG Oral Tab Take 1 tablet (20 mg total) by mouth daily. 90 tablet 3    omega-3 fatty acids 1000 MG Oral Cap Take 1,000 mg by mouth daily.      Ascorbic Acid (VITAMIN C) 1000 MG Oral Tab Take 1 tablet (1,000 mg total) by mouth daily.        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    Disorder of liver    NAFLD    Essential hypertension    Hearing impairment    Deaf in left ear    High blood pressure    High cholesterol    Hyperlipidemia    Memory problem    Sleep apnea    CPAP    Visual impairment    Glasses      Social History:  Social History     Socioeconomic History    Marital status:    Tobacco Use    Smoking status: Never     Passive exposure: Never    Smokeless tobacco: Never    Tobacco comments:     Updated 5/1/24   Vaping Use    Vaping status: Never Used   Substance and Sexual Activity    Alcohol use: Yes     Alcohol/week: 2.0 standard drinks of alcohol     Types: 2 Cans of beer per week     Comment: 2 drinks per week    Drug use: Yes     Types: Cannabis     Comment: Approximately once  per month   Other Topics Concern    Caffeine Concern Yes     Comment: 2-3 cups a day    Exercise Yes     Comment: 1 x a week     Social Determinants of Health     Physical Activity: Inactive (11/20/2023)    [facility]    Exercise Vital Sign     Days of Exercise per Week: 0 days     Minutes of Exercise per Session: 0 min        BP Readings from Last 6 Encounters:   05/09/24 112/64   04/19/24 124/72   04/16/24 114/76   04/01/24 120/76   12/08/23 116/80   12/01/23 131/80       Wt Readings from Last 6 Encounters:   05/09/24 184 lb (83.5 kg)   05/01/24 188 lb 9.6 oz (85.5 kg)   04/19/24 190 lb (86.2 kg)   04/16/24 192 lb (87.1 kg)   04/01/24 190 lb (86.2 kg)   12/14/23 190 lb (86.2 kg)       REVIEW OF SYSTEMS:   GENERAL HEALTH: feels well no complaints  SKIN: denies any unusual skin lesions or rashes  RESPIRATORY: denies shortness of breath with exertion  CARDIOVASCULAR: denies chest pain on exertion  GI: denies abdominal pain and denies heartburn  NEURO: denies headaches    EXAM:   /64   Pulse 67   Temp (!) 95.9 °F (35.5 °C)   Resp 18   Ht 5' 8.5\" (1.74 m)   Wt 184 lb (83.5 kg)   SpO2 99%   BMI 27.57 kg/m²  Body mass index is 27.57 kg/m².      GENERAL: well developed, well nourished,in no apparent distress  SKIN: no rashes,no suspicious lesions other than a dime sized bulls eye rash next to puncture wound on upper right anterior thigh.   HEENT: atraumatic, normocephalic,   NECK: supple,no adenopathy,   LUNGS: clear to auscultation  CARDIO: RRR without murmur  GI: good BS's,no masses, HSM or tenderness  EXTREMITIES: no cyanosis, clubbing or edema    ASSESSMENT AND PLAN:     Encounter Diagnoses   Name Primary?    Erythema migrans (Lyme disease) Yes    Pain in both hands        Diagnoses and all orders for this visit:    Erythema migrans (Lyme disease)  -     doxycycline 100 MG Oral Cap; Take 1 capsule (100 mg total) by mouth 2 (two) times daily for 10 days.  - treat empirically given  high risk for tick bite and appearance of rash.     Pain in both hands    Okay to hold off on OT. I printed and gave him referral in case symptoms worsen.     No orders of the defined types were placed in this encounter.              Meds & Refills for this Visit:  Requested Prescriptions     Signed Prescriptions Disp Refills    doxycycline 100 MG Oral Cap 20 capsule 0     Sig: Take 1 capsule (100 mg total) by mouth 2 (two) times daily for 10 days.             The patient indicates understanding of these issues and agrees to the plan.

## 2024-05-28 ENCOUNTER — TELEMEDICINE (OUTPATIENT)
Dept: FAMILY MEDICINE CLINIC | Facility: CLINIC | Age: 69
End: 2024-05-28

## 2024-05-28 DIAGNOSIS — J32.9 SINUSITIS, UNSPECIFIED CHRONICITY, UNSPECIFIED LOCATION: ICD-10-CM

## 2024-05-28 DIAGNOSIS — J40 BRONCHITIS: Primary | ICD-10-CM

## 2024-05-28 PROCEDURE — 99214 OFFICE O/P EST MOD 30 MIN: CPT | Performed by: FAMILY MEDICINE

## 2024-05-28 RX ORDER — ALBUTEROL SULFATE 90 UG/1
2 AEROSOL, METERED RESPIRATORY (INHALATION) EVERY 6 HOURS PRN
Qty: 1 EACH | Refills: 0 | Status: SHIPPED | OUTPATIENT
Start: 2024-05-28

## 2024-05-28 RX ORDER — AMOXICILLIN AND CLAVULANATE POTASSIUM 875; 125 MG/1; MG/1
1 TABLET, FILM COATED ORAL 2 TIMES DAILY
Qty: 20 TABLET | Refills: 0 | Status: SHIPPED | OUTPATIENT
Start: 2024-05-28 | End: 2024-06-07

## 2024-05-28 RX ORDER — PREDNISONE 20 MG/1
40 TABLET ORAL DAILY
Qty: 10 TABLET | Refills: 0 | Status: SHIPPED | OUTPATIENT
Start: 2024-05-28 | End: 2024-06-02

## 2024-05-28 NOTE — PROGRESS NOTES
This is a telemedicine visit with live, interactive video and audio.     Patient understands and accepts financial responsibility for any deductible, co-insurance and/or co-pays associated with this service.    SUBJECTIVE  Has had a constant cough x 1 month. He is sick and tired of being sick and tired.   Went to [place] for fishing and coughed the whole wekeend (that was a month ago). Has been coughing since then.   Coughed the whole trip in [place].   No fevers.   Wakes up with a little sweat.   No chest tightness or wheezing but a little short of breath.   Son was sick at end of April. Now wife is getting sick as well.   + nasal congestion, mostly clear, but some yellow tinge.   Drinking a lot of water.   Has had albuterol in the past and seemed to help.     He was on doxycycline x 10 days after a tick bite, but that did not help with the cough. Did not get worse or any better.     Every spring he gets a chronic cough in the past few years.     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    Disorder of liver    NAFLD    Essential hypertension    Hearing impairment    Deaf in left ear    High blood pressure    High cholesterol    Hyperlipidemia    Memory problem    Sleep apnea    CPAP    Visual impairment    Glasses      Past Surgical History:   Procedure Laterality Date    Cath drug eluting stent      Colonoscopy  10/03/2016    normal, repeat in 3 yrs due to h/o polyps and family h/o colon cancer    Colonoscopy N/A 10/03/2016    Procedure: COLONOSCOPY;  Surgeon: Hakan Lui MD;  Location:  ENDOSCOPY    Other      Heart stent      Family History   Problem Relation Age of Onset    Cancer Father 82        colon     Psychiatric Son         depression    Other (Other) Paternal Grandmother         glaucoma       Social History     Socioeconomic History    Marital status:    Tobacco Use     Smoking status: Never     Passive exposure: Never    Smokeless tobacco: Never    Tobacco comments:     Updated 5/1/24   Vaping Use    Vaping status: Never Used   Substance and Sexual Activity    Alcohol use: Yes     Alcohol/week: 2.0 standard drinks of alcohol     Types: 2 Cans of beer per week     Comment: 2 drinks per week    Drug use: Yes     Types: Cannabis     Comment: Approximately once per month   Other Topics Concern    Caffeine Concern Yes     Comment: 2-3 cups a day    Exercise Yes     Comment: 1 x a week     Social Determinants of Health     Physical Activity: Inactive (11/20/2023)    [facility]    Exercise Vital Sign     Days of Exercise per Week: 0 days     Minutes of Exercise per Session: 0 min        No Known Allergies   Current Outpatient Medications   Medication Sig Dispense Refill    predniSONE 20 MG Oral Tab Take 2 tablets (40 mg total) by mouth daily for 5 days. 10 tablet 0    albuterol 108 (90 Base) MCG/ACT Inhalation Aero Soln Inhale 2 puffs into the lungs every 6 (six) hours as needed. 1 each 0    amoxicillin clavulanate 875-125 MG Oral Tab Take 1 tablet by mouth 2 (two) times daily for 10 days. 20 tablet 0    METOPROLOL SUCCINATE ER 25 MG Oral Tablet 24 Hr Take 1 tablet (25 mg total) by mouth daily. 90 tablet 0    atorvastatin 20 MG Oral Tab Take 1 tablet (20 mg total) by mouth daily. 90 tablet 3    omega-3 fatty acids 1000 MG Oral Cap Take 1,000 mg by mouth daily.      Ascorbic Acid (VITAMIN C) 1000 MG Oral Tab Take 1 tablet (1,000 mg total) by mouth daily.         OBJECTIVE  Physical Exam:   alert, appears stated age, and cooperative, Normocephalic, without obvious abnormality, atraumatic, Speaking in full sentences comfortably, Normal work of breathing, and age appropriate, normal, and logical connections.   + tight cough frequently.     ASSESSMENT & PLAN  Diagnoses and all orders for this visit:    Bronchitis  -     predniSONE 20 MG Oral Tab; Take 2 tablets  (40 mg total) by mouth daily for 5 days.  -     albuterol 108 (90 Base) MCG/ACT Inhalation Aero Soln; Inhale 2 puffs into the lungs every 6 (six) hours as needed.    Sinusitis, unspecified chronicity, unspecified location  -     amoxicillin clavulanate 875-125 MG Oral Tab; Take 1 tablet by mouth 2 (two) times daily for 10 days.    Treat as above.   Follow up if not getting better in the next 1-2 weeks, would repeat CXR.       Lucia Walls, DO    Please note that the following visit was completed using two-way, real-time interactive audio and/or video communication.  This has been done in good omar to provide continuity of care in the best interest of the provider-patient relationship, due to the ongoing public health crisis/national emergency and because of restrictions of visitation.  There are limitations of this visit as no physical exam could be performed.  Every conscious effort was taken to allow for sufficient and adequate time.  This billing was spent on reviewing labs, medications, radiology tests and decision making.  Appropriate medical decision-making and tests are ordered as detailed in the plan of care above.

## 2024-06-17 RX ORDER — METOPROLOL SUCCINATE 25 MG/1
25 TABLET, EXTENDED RELEASE ORAL DAILY
Qty: 90 TABLET | Refills: 0 | Status: SHIPPED | OUTPATIENT
Start: 2024-06-17

## 2024-06-17 NOTE — TELEPHONE ENCOUNTER
Hypertension Medications Protocol [id number]/17/2024 01:07 PM   Protocol Details CMP or BMP in past 12 months    Last BP reading less than 140/90    In person appointment or virtual visit in the past 12 mos or appointment in next 3 mos    EGFRCR or GFRNAA > 50      Refilled per protocol  METOPROLOL SUCCINATE ER 25 MG Oral Tablet 24 Hr   Last refilled on 4/25/24 #90 with 0 rf.  LOV- 5/9/24  Last labs- 4/1/24    Sent to pharmacy

## 2024-08-12 ENCOUNTER — PATIENT MESSAGE (OUTPATIENT)
Dept: FAMILY MEDICINE CLINIC | Facility: CLINIC | Age: 69
End: 2024-08-12

## 2024-08-12 NOTE — TELEPHONE ENCOUNTER
From: Adam Villasenor  To: Lucia Walls  Sent: 8/12/2024 12:06 PM CDT  Subject: Nose wounds not healing.    Been applying neosporin for 2 months, wounds still not healing

## 2024-08-13 NOTE — TELEPHONE ENCOUNTER
Future Appointments   Date Time Provider Department Center   8/15/2024  8:15 AM Lucia Walls DO [facility] [place]

## 2024-08-15 ENCOUNTER — EXTERNAL LAB (OUTPATIENT)
Dept: HEALTH INFORMATION MANAGEMENT | Facility: OTHER | Age: 69
End: 2024-08-15

## 2024-08-15 ENCOUNTER — OFFICE VISIT (OUTPATIENT)
Dept: FAMILY MEDICINE CLINIC | Facility: CLINIC | Age: 69
End: 2024-08-15
Payer: MEDICARE

## 2024-08-15 VITALS
WEIGHT: 185.81 LBS | RESPIRATION RATE: 18 BRPM | HEART RATE: 67 BPM | BODY MASS INDEX: 28 KG/M2 | OXYGEN SATURATION: 98 % | SYSTOLIC BLOOD PRESSURE: 120 MMHG | TEMPERATURE: 97 F | DIASTOLIC BLOOD PRESSURE: 70 MMHG

## 2024-08-15 DIAGNOSIS — Z95.5 PRESENCE OF DRUG COATED STENT IN LAD CORONARY ARTERY: ICD-10-CM

## 2024-08-15 DIAGNOSIS — I10 ESSENTIAL HYPERTENSION: ICD-10-CM

## 2024-08-15 DIAGNOSIS — J34.89 SORE IN NOSE: Primary | ICD-10-CM

## 2024-08-15 LAB
ALBUMIN SERPL-MCNC: 4.5 G/DL (ref 3.2–4.8)
ALBUMIN/GLOB SERPL: 1.9 {RATIO} (ref 1–2)
ALP LIVER SERPL-CCNC: 99 U/L
ALT SERPL-CCNC: 34 U/L
ANION GAP SERPL CALC-SCNC: 5 MMOL/L (ref 0–18)
AST SERPL-CCNC: 24 U/L (ref ?–34)
BASOPHILS # BLD AUTO: 0.08 X10(3) UL (ref 0–0.2)
BASOPHILS NFR BLD AUTO: 1.2 %
BILIRUB SERPL-MCNC: 1.1 MG/DL (ref 0.2–1.1)
BUN BLD-MCNC: 18 MG/DL (ref 9–23)
CALCIUM BLD-MCNC: 9.5 MG/DL (ref 8.7–10.4)
CHLORIDE SERPL-SCNC: 108 MMOL/L (ref 98–112)
CHOLEST SERPL-MCNC: 165 MG/DL
CHOLEST SERPL-MCNC: 165 MG/DL (ref ?–200)
CO2 SERPL-SCNC: 26 MMOL/L (ref 21–32)
CREAT BLD-MCNC: 0.9 MG/DL
EGFRCR SERPLBLD CKD-EPI 2021: 92 ML/MIN/1.73M2 (ref 60–?)
EOSINOPHIL # BLD AUTO: 0.11 X10(3) UL (ref 0–0.7)
EOSINOPHIL NFR BLD AUTO: 1.7 %
ERYTHROCYTE [DISTWIDTH] IN BLOOD BY AUTOMATED COUNT: 14.8 %
FASTING PATIENT LIPID ANSWER: YES
FASTING STATUS PATIENT QL REPORTED: YES
GLOBULIN PLAS-MCNC: 2.4 G/DL (ref 2–3.5)
GLUCOSE BLD-MCNC: 97 MG/DL (ref 70–99)
HCT VFR BLD AUTO: 43.3 %
HDLC SERPL-MCNC: 40 MG/DL (ref 40–59)
HDLC SERPL-MCNC: 40 MG/DL (ref 40–59)
HGB BLD-MCNC: 14.9 G/DL
IMM GRANULOCYTES # BLD AUTO: 0.04 X10(3) UL (ref 0–1)
IMM GRANULOCYTES NFR BLD: 0.6 %
LAB RESULT: NORMAL
LDLC SERPL CALC-MCNC: 104 MG/DL
LDLC SERPL CALC-MCNC: 104 MG/DL (ref ?–100)
LENGTH OF FAST TIME PATIENT: YES H
LYMPHOCYTES # BLD AUTO: 1.58 X10(3) UL (ref 1–4)
LYMPHOCYTES NFR BLD AUTO: 23.9 %
MCH RBC QN AUTO: 34 PG (ref 26–34)
MCHC RBC AUTO-ENTMCNC: 34.4 G/DL (ref 31–37)
MCV RBC AUTO: 98.9 FL
MONOCYTES # BLD AUTO: 0.63 X10(3) UL (ref 0.1–1)
MONOCYTES NFR BLD AUTO: 9.5 %
NEUTROPHILS # BLD AUTO: 4.17 X10 (3) UL (ref 1.5–7.7)
NEUTROPHILS # BLD AUTO: 4.17 X10(3) UL (ref 1.5–7.7)
NEUTROPHILS NFR BLD AUTO: 63.1 %
NONHDLC SERPL-MCNC: 125 MG/DL
NONHDLC SERPL-MCNC: 125 MG/DL (ref ?–130)
OSMOLALITY SERPL CALC.SUM OF ELEC: 290 MOSM/KG (ref 275–295)
PLATELET # BLD AUTO: 302 10(3)UL (ref 150–450)
POTASSIUM SERPL-SCNC: 4.1 MMOL/L (ref 3.5–5.1)
PROT SERPL-MCNC: 6.9 G/DL (ref 5.7–8.2)
RBC # BLD AUTO: 4.38 X10(6)UL
SODIUM SERPL-SCNC: 139 MMOL/L (ref 136–145)
TRIGL SERPL-MCNC: 117 MG/DL (ref 30–149)
TRIGL SERPL-MCNC: 117 MG/DL (ref 30–149)
VLDLC SERPL CALC-MCNC: 20 MG/DL (ref 0–30)
VLDLC SERPL CALC-MCNC: 20 MG/DL (ref 0–30)
WBC # BLD AUTO: 6.6 X10(3) UL (ref 4–11)

## 2024-08-15 PROCEDURE — 80053 COMPREHEN METABOLIC PANEL: CPT | Performed by: FAMILY MEDICINE

## 2024-08-15 PROCEDURE — 85025 COMPLETE CBC W/AUTO DIFF WBC: CPT | Performed by: FAMILY MEDICINE

## 2024-08-15 PROCEDURE — 80061 LIPID PANEL: CPT | Performed by: FAMILY MEDICINE

## 2024-08-15 PROCEDURE — 99214 OFFICE O/P EST MOD 30 MIN: CPT | Performed by: FAMILY MEDICINE

## 2024-08-15 NOTE — PROGRESS NOTES
Adam Villasenor is a 69 year old male.  Chief Complaint   Patient presents with    Nose Problem     Inside of nose x 2 months        HPI:   Sore inside the dose started after pulling out nose hairs that were bothering him about 2 months ago.   It is painful, bleeding at times. The tip of his nose is tender. Wife says his nose is more red than usual.     Needs labs done for upcoming cardiology appointment. Dr Benavides.     ALLERGIES:  No Known Allergies      Current Outpatient Medications   Medication Sig Dispense Refill    mupirocin 2 % External Ointment Apply 1 Application topically 3 (three) times daily for 7 days. 22 g 0    METOPROLOL SUCCINATE ER 25 MG Oral Tablet 24 Hr Take 1 tablet (25 mg total) by mouth daily. 90 tablet 0    atorvastatin 20 MG Oral Tab Take 1 tablet (20 mg total) by mouth daily. 90 tablet 3    albuterol 108 (90 Base) MCG/ACT Inhalation Aero Soln Inhale 2 puffs into the lungs every 6 (six) hours as needed. (Patient not taking: Reported on 8/15/2024) 1 each 0    omega-3 fatty acids 1000 MG Oral Cap Take 1,000 mg by mouth daily. (Patient not taking: Reported on 8/15/2024)      Ascorbic Acid (VITAMIN C) 1000 MG Oral Tab Take 1 tablet (1,000 mg total) by mouth daily. 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    Disorder of liver    NAFLD    Essential hypertension    Hearing impairment    Deaf in left ear    High blood pressure    High cholesterol    Hyperlipidemia    Memory problem    Sleep apnea    CPAP    Visual impairment    Glasses      Social History:  Social History     Socioeconomic History    Marital status:    Tobacco Use    Smoking status: Never     Passive exposure: Never    Smokeless tobacco: Never    Tobacco comments:     Updated 5/1/24   Vaping Use    Vaping status: Never Used   Substance and Sexual Activity    Alcohol use:  Yes     Alcohol/week: 2.0 standard drinks of alcohol     Types: 2 Cans of beer per week     Comment: 2 drinks per week    Drug use: Yes     Types: Cannabis     Comment: Approximately once per month   Other Topics Concern    Caffeine Concern Yes     Comment: 2-3 cups a day    Exercise Yes     Comment: 1 x a week     Social Determinants of Health     Physical Activity: Inactive (11/20/2023)    [facility]    Exercise Vital Sign     Days of Exercise per Week: 0 days     Minutes of Exercise per Session: 0 min        BP Readings from Last 6 Encounters:   08/15/24 120/70   05/09/24 112/64   04/19/24 124/72   04/16/24 114/76   04/01/24 120/76   12/08/23 116/80       Wt Readings from Last 6 Encounters:   08/15/24 185 lb 12.8 oz (84.3 kg)   05/09/24 184 lb (83.5 kg)   05/01/24 188 lb 9.6 oz (85.5 kg)   04/19/24 190 lb (86.2 kg)   04/16/24 192 lb (87.1 kg)   04/01/24 190 lb (86.2 kg)       REVIEW OF SYSTEMS:   GENERAL HEALTH: feels well no complaints other than above   SKIN: denies any unusual skin lesions or rashes  RESPIRATORY: denies shortness of breath    CARDIOVASCULAR: denies chest pain    GI: denies abdominal pain and denies heartburn  NEURO: denies headaches    EXAM:   /70 (BP Location: Left arm, Patient Position: Sitting, Cuff Size: large)   Pulse 67   Temp 97 °F (36.1 °C) (Temporal)   Resp 18   Wt 185 lb 12.8 oz (84.3 kg)   SpO2 98%   BMI 27.84 kg/m²  Body mass index is 27.84 kg/m².      GENERAL: well developed, well nourished,in no apparent distress  SKIN: no rashes,no suspicious lesions  HEENT: atraumatic, normocephalic, I cannot see a sore in nose, but tip of nose is mildly tender   NECK: supple,no adenopathy   LUNGS: clear to auscultation  CARDIO: RRR without murmur  GI: good BS's,no masses, HSM or tenderness  EXTREMITIES: no cyanosis, clubbing or edema    ASSESSMENT AND PLAN:     Encounter Diagnoses   Name Primary?    Sore in nose Yes    Presence of drug coated stent in LAD  coronary artery     Essential hypertension        Diagnoses and all orders for this visit:    Sore in nose  -     mupirocin 2 % External Ointment; Apply 1 Application topically 3 (three) times daily for 7 days.  - if not healing with mupirocin, then would refer to ENT to check it out.     Presence of drug coated stent in LAD coronary artery  -     CBC With Differential With Platelet; Future  -     Comp Metabolic Panel (14); Future  -     Lipid Panel; Future  -     VENIPUNCTURE  - labs for cardiology, fax to Dr. Benavides's office when done.     Essential hypertension  -     CBC With Differential With Platelet; Future  -     Comp Metabolic Panel (14); Future  -     Lipid Panel; Future  -     VENIPUNCTURE        Orders Placed This Encounter   Procedures    CBC With Differential With Platelet     Standing Status:   Future     Number of Occurrences:   1     Standing Expiration Date:   8/16/2025    Comp Metabolic Panel (14)     Standing Status:   Future     Number of Occurrences:   1     Standing Expiration Date:   8/16/2025     Order Specific Question:   [facility]: Has the patient fasted?     Answer:   Yes    Lipid Panel     Standing Status:   Future     Number of Occurrences:   1     Standing Expiration Date:   8/15/2025     Order Specific Question:   [facility]: Has the patient fasted?     Answer:   Yes    VENIPUNCTURE     Order Specific Question:   Release to patient     Answer:   Immediate               Meds & Refills for this Visit:  Requested Prescriptions     Signed Prescriptions Disp Refills    mupirocin 2 % External Ointment 22 g 0     Sig: Apply 1 Application topically 3 (three) times daily for 7 days.             The patient indicates understanding of these issues and agrees to the plan.

## 2024-08-20 ENCOUNTER — TELEPHONE (OUTPATIENT)
Dept: CARDIOLOGY | Age: 69
End: 2024-08-20

## 2024-08-21 ENCOUNTER — APPOINTMENT (OUTPATIENT)
Dept: CARDIOLOGY | Age: 69
End: 2024-08-21

## 2024-08-21 VITALS
BODY MASS INDEX: 27.4 KG/M2 | SYSTOLIC BLOOD PRESSURE: 120 MMHG | DIASTOLIC BLOOD PRESSURE: 68 MMHG | WEIGHT: 185 LBS | HEIGHT: 69 IN | HEART RATE: 60 BPM

## 2024-08-21 DIAGNOSIS — I10 ESSENTIAL (PRIMARY) HYPERTENSION: ICD-10-CM

## 2024-08-21 DIAGNOSIS — Z86.69 HISTORY OF SLEEP APNEA: Chronic | ICD-10-CM

## 2024-08-21 DIAGNOSIS — E78.00 PURE HYPERCHOLESTEROLEMIA: ICD-10-CM

## 2024-08-21 DIAGNOSIS — R94.39 ABNORMAL CARDIOVASCULAR STRESS TEST: Primary | ICD-10-CM

## 2024-08-21 DIAGNOSIS — Z95.5 PRESENCE OF CORONARY ANGIOPLASTY IMPLANT AND GRAFT: ICD-10-CM

## 2024-08-21 DIAGNOSIS — R07.2 CHEST PAIN, PRECORDIAL: ICD-10-CM

## 2024-08-21 DIAGNOSIS — I25.10 ATHEROSCLEROSIS OF NATIVE CORONARY ARTERY OF NATIVE HEART WITHOUT ANGINA PECTORIS: ICD-10-CM

## 2024-08-21 RX ORDER — ROSUVASTATIN CALCIUM 10 MG/1
10 TABLET, COATED ORAL DAILY
Qty: 90 TABLET | Refills: 3 | Status: SHIPPED | OUTPATIENT
Start: 2024-08-21

## 2024-08-21 SDOH — HEALTH STABILITY: PHYSICAL HEALTH: ON AVERAGE, HOW MANY MINUTES DO YOU ENGAGE IN EXERCISE AT THIS LEVEL?: 0 MIN

## 2024-08-21 SDOH — HEALTH STABILITY: PHYSICAL HEALTH: ON AVERAGE, HOW MANY DAYS PER WEEK DO YOU ENGAGE IN MODERATE TO STRENUOUS EXERCISE (LIKE A BRISK WALK)?: 0 DAYS

## 2024-08-21 ASSESSMENT — PATIENT HEALTH QUESTIONNAIRE - PHQ9
SUM OF ALL RESPONSES TO PHQ9 QUESTIONS 1 AND 2: 0
SUM OF ALL RESPONSES TO PHQ9 QUESTIONS 1 AND 2: 0
2. FEELING DOWN, DEPRESSED OR HOPELESS: NOT AT ALL
1. LITTLE INTEREST OR PLEASURE IN DOING THINGS: NOT AT ALL
CLINICAL INTERPRETATION OF PHQ2 SCORE: NO FURTHER SCREENING NEEDED

## 2024-08-22 ENCOUNTER — MED REC SCAN ONLY (OUTPATIENT)
Dept: FAMILY MEDICINE CLINIC | Facility: CLINIC | Age: 69
End: 2024-08-22

## 2024-11-22 ENCOUNTER — HOSPITAL ENCOUNTER (OUTPATIENT)
Facility: HOSPITAL | Age: 69
Setting detail: OBSERVATION
Discharge: HOME | End: 2024-11-22
Attending: EMERGENCY MEDICINE | Admitting: HOSPITALIST
Payer: MEDICARE

## 2024-11-22 ENCOUNTER — TELEPHONE (OUTPATIENT)
Dept: SURGERY | Facility: CLINIC | Age: 69
End: 2024-11-22

## 2024-11-22 ENCOUNTER — APPOINTMENT (OUTPATIENT)
Dept: GENERAL RADIOLOGY | Facility: HOSPITAL | Age: 69
End: 2024-11-22
Attending: UROLOGY
Payer: MEDICARE

## 2024-11-22 ENCOUNTER — APPOINTMENT (OUTPATIENT)
Dept: CT IMAGING | Age: 69
End: 2024-11-22
Attending: EMERGENCY MEDICINE
Payer: MEDICARE

## 2024-11-22 ENCOUNTER — ANESTHESIA (OUTPATIENT)
Dept: SURGERY | Facility: HOSPITAL | Age: 69
End: 2024-11-22
Payer: MEDICARE

## 2024-11-22 ENCOUNTER — ANESTHESIA EVENT (OUTPATIENT)
Dept: SURGERY | Facility: HOSPITAL | Age: 69
End: 2024-11-22
Payer: MEDICARE

## 2024-11-22 VITALS
BODY MASS INDEX: 27.4 KG/M2 | DIASTOLIC BLOOD PRESSURE: 67 MMHG | HEART RATE: 57 BPM | SYSTOLIC BLOOD PRESSURE: 157 MMHG | HEIGHT: 69 IN | OXYGEN SATURATION: 96 % | RESPIRATION RATE: 10 BRPM | TEMPERATURE: 98 F | WEIGHT: 185 LBS

## 2024-11-22 DIAGNOSIS — N20.1 URETERAL CALCULI: ICD-10-CM

## 2024-11-22 DIAGNOSIS — N20.1 URETEROLITHIASIS: Primary | ICD-10-CM

## 2024-11-22 LAB
ALBUMIN SERPL-MCNC: 4.7 G/DL (ref 3.2–4.8)
ALBUMIN/GLOB SERPL: 2 {RATIO} (ref 1–2)
ALP LIVER SERPL-CCNC: 88 U/L
ALT SERPL-CCNC: 43 U/L
ANION GAP SERPL CALC-SCNC: 8 MMOL/L (ref 0–18)
AST SERPL-CCNC: 25 U/L (ref ?–34)
BASOPHILS # BLD AUTO: 0.06 X10(3) UL (ref 0–0.2)
BASOPHILS NFR BLD AUTO: 0.6 %
BILIRUB SERPL-MCNC: 0.8 MG/DL (ref 0.2–1.1)
BILIRUB UR QL STRIP.AUTO: NEGATIVE
BUN BLD-MCNC: 18 MG/DL (ref 9–23)
CALCIUM BLD-MCNC: 9.6 MG/DL (ref 8.7–10.4)
CHLORIDE SERPL-SCNC: 109 MMOL/L (ref 98–112)
CLARITY UR REFRACT.AUTO: CLEAR
CO2 SERPL-SCNC: 24 MMOL/L (ref 21–32)
COLOR UR AUTO: YELLOW
CREAT BLD-MCNC: 0.87 MG/DL
EGFRCR SERPLBLD CKD-EPI 2021: 93 ML/MIN/1.73M2 (ref 60–?)
EOSINOPHIL # BLD AUTO: 0.09 X10(3) UL (ref 0–0.7)
EOSINOPHIL NFR BLD AUTO: 0.9 %
ERYTHROCYTE [DISTWIDTH] IN BLOOD BY AUTOMATED COUNT: 14.4 %
GLOBULIN PLAS-MCNC: 2.4 G/DL (ref 2–3.5)
GLUCOSE BLD-MCNC: 105 MG/DL (ref 70–99)
GLUCOSE UR STRIP.AUTO-MCNC: NEGATIVE MG/DL
HCT VFR BLD AUTO: 42.6 %
HGB BLD-MCNC: 14.8 G/DL
IMM GRANULOCYTES # BLD AUTO: 0.05 X10(3) UL (ref 0–1)
IMM GRANULOCYTES NFR BLD: 0.5 %
KETONES UR STRIP.AUTO-MCNC: NEGATIVE MG/DL
LEUKOCYTE ESTERASE UR QL STRIP.AUTO: NEGATIVE
LYMPHOCYTES # BLD AUTO: 1.62 X10(3) UL (ref 1–4)
LYMPHOCYTES NFR BLD AUTO: 16 %
MCH RBC QN AUTO: 33.9 PG (ref 26–34)
MCHC RBC AUTO-ENTMCNC: 34.7 G/DL (ref 31–37)
MCV RBC AUTO: 97.7 FL
MONOCYTES # BLD AUTO: 0.75 X10(3) UL (ref 0.1–1)
MONOCYTES NFR BLD AUTO: 7.4 %
NEUTROPHILS # BLD AUTO: 7.57 X10 (3) UL (ref 1.5–7.7)
NEUTROPHILS # BLD AUTO: 7.57 X10(3) UL (ref 1.5–7.7)
NEUTROPHILS NFR BLD AUTO: 74.6 %
NITRITE UR QL STRIP.AUTO: NEGATIVE
OSMOLALITY SERPL CALC.SUM OF ELEC: 294 MOSM/KG (ref 275–295)
PH UR STRIP.AUTO: 5 [PH] (ref 5–8)
PLATELET # BLD AUTO: 278 10(3)UL (ref 150–450)
POTASSIUM SERPL-SCNC: 3.7 MMOL/L (ref 3.5–5.1)
PROT SERPL-MCNC: 7.1 G/DL (ref 5.7–8.2)
RBC # BLD AUTO: 4.36 X10(6)UL
RBC #/AREA URNS AUTO: >10 /HPF
SODIUM SERPL-SCNC: 141 MMOL/L (ref 136–145)
SP GR UR STRIP.AUTO: >=1.03 (ref 1–1.03)
UROBILINOGEN UR STRIP.AUTO-MCNC: 0.2 MG/DL
WBC # BLD AUTO: 10.1 X10(3) UL (ref 4–11)

## 2024-11-22 PROCEDURE — 76000 FLUOROSCOPY <1 HR PHYS/QHP: CPT | Performed by: UROLOGY

## 2024-11-22 PROCEDURE — 74220 X-RAY XM ESOPHAGUS 1CNTRST: CPT | Performed by: UROLOGY

## 2024-11-22 PROCEDURE — 0T778DZ DILATION OF LEFT URETER WITH INTRALUMINAL DEVICE, VIA NATURAL OR ARTIFICIAL OPENING ENDOSCOPIC: ICD-10-PCS | Performed by: UROLOGY

## 2024-11-22 PROCEDURE — 99222 1ST HOSP IP/OBS MODERATE 55: CPT | Performed by: PHYSICIAN ASSISTANT

## 2024-11-22 PROCEDURE — 0TF78ZZ FRAGMENTATION IN LEFT URETER, VIA NATURAL OR ARTIFICIAL OPENING ENDOSCOPIC: ICD-10-PCS | Performed by: UROLOGY

## 2024-11-22 PROCEDURE — 52356 CYSTO/URETERO W/LITHOTRIPSY: CPT | Performed by: UROLOGY

## 2024-11-22 PROCEDURE — 74176 CT ABD & PELVIS W/O CONTRAST: CPT | Performed by: EMERGENCY MEDICINE

## 2024-11-22 PROCEDURE — 99497 ADVNCD CARE PLAN 30 MIN: CPT | Performed by: HOSPITALIST

## 2024-11-22 PROCEDURE — 99223 1ST HOSP IP/OBS HIGH 75: CPT | Performed by: HOSPITALIST

## 2024-11-22 PROCEDURE — BT1FZZZ FLUOROSCOPY OF LEFT KIDNEY, URETER AND BLADDER: ICD-10-PCS | Performed by: UROLOGY

## 2024-11-22 DEVICE — URETERAL STENT
Type: IMPLANTABLE DEVICE | Site: URETER | Status: FUNCTIONAL
Brand: ASCERTA™

## 2024-11-22 RX ORDER — ONDANSETRON 2 MG/ML
4 INJECTION INTRAMUSCULAR; INTRAVENOUS EVERY 6 HOURS PRN
Status: DISCONTINUED | OUTPATIENT
Start: 2024-11-22 | End: 2024-11-22 | Stop reason: HOSPADM

## 2024-11-22 RX ORDER — BISACODYL 10 MG
10 SUPPOSITORY, RECTAL RECTAL
Status: DISCONTINUED | OUTPATIENT
Start: 2024-11-22 | End: 2024-11-23

## 2024-11-22 RX ORDER — HYDROMORPHONE HYDROCHLORIDE 1 MG/ML
0.4 INJECTION, SOLUTION INTRAMUSCULAR; INTRAVENOUS; SUBCUTANEOUS EVERY 5 MIN PRN
Status: DISCONTINUED | OUTPATIENT
Start: 2024-11-22 | End: 2024-11-22 | Stop reason: HOSPADM

## 2024-11-22 RX ORDER — POLYETHYLENE GLYCOL 3350 17 G/17G
17 POWDER, FOR SOLUTION ORAL DAILY PRN
Qty: 20 PACKET | Refills: 1 | Status: SHIPPED | OUTPATIENT
Start: 2024-11-22

## 2024-11-22 RX ORDER — MORPHINE SULFATE 4 MG/ML
2 INJECTION, SOLUTION INTRAMUSCULAR; INTRAVENOUS ONCE
Status: COMPLETED | OUTPATIENT
Start: 2024-11-22 | End: 2024-11-22

## 2024-11-22 RX ORDER — ONDANSETRON 4 MG/1
4 TABLET, ORALLY DISINTEGRATING ORAL EVERY 4 HOURS PRN
Qty: 10 TABLET | Refills: 0 | Status: CANCELLED | OUTPATIENT
Start: 2024-11-22 | End: 2024-11-29

## 2024-11-22 RX ORDER — SENNOSIDES 8.6 MG
17.2 TABLET ORAL NIGHTLY PRN
Status: DISCONTINUED | OUTPATIENT
Start: 2024-11-22 | End: 2024-11-23

## 2024-11-22 RX ORDER — OXYBUTYNIN CHLORIDE 5 MG/1
5 TABLET ORAL 3 TIMES DAILY PRN
Qty: 15 TABLET | Refills: 0 | Status: SHIPPED | OUTPATIENT
Start: 2024-11-22

## 2024-11-22 RX ORDER — ACETAMINOPHEN 500 MG
1000 TABLET ORAL ONCE AS NEEDED
Status: DISCONTINUED | OUTPATIENT
Start: 2024-11-22 | End: 2024-11-22 | Stop reason: HOSPADM

## 2024-11-22 RX ORDER — KETOROLAC TROMETHAMINE 15 MG/ML
15 INJECTION, SOLUTION INTRAMUSCULAR; INTRAVENOUS ONCE
Status: COMPLETED | OUTPATIENT
Start: 2024-11-22 | End: 2024-11-22

## 2024-11-22 RX ORDER — DEXAMETHASONE SODIUM PHOSPHATE 4 MG/ML
VIAL (ML) INJECTION AS NEEDED
Status: DISCONTINUED | OUTPATIENT
Start: 2024-11-22 | End: 2024-11-22 | Stop reason: SURG

## 2024-11-22 RX ORDER — METOCLOPRAMIDE HYDROCHLORIDE 5 MG/ML
INJECTION INTRAMUSCULAR; INTRAVENOUS AS NEEDED
Status: DISCONTINUED | OUTPATIENT
Start: 2024-11-22 | End: 2024-11-22 | Stop reason: SURG

## 2024-11-22 RX ORDER — ROSUVASTATIN CALCIUM 5 MG/1
10 TABLET, COATED ORAL DAILY
Status: DISCONTINUED | OUTPATIENT
Start: 2024-11-22 | End: 2024-11-23

## 2024-11-22 RX ORDER — METOPROLOL SUCCINATE 25 MG/1
25 TABLET, EXTENDED RELEASE ORAL
Status: DISCONTINUED | OUTPATIENT
Start: 2024-11-22 | End: 2024-11-23

## 2024-11-22 RX ORDER — TAMSULOSIN HYDROCHLORIDE 0.4 MG/1
0.4 CAPSULE ORAL EVERY EVENING
Qty: 14 CAPSULE | Refills: 0 | Status: SHIPPED | OUTPATIENT
Start: 2024-11-22 | End: 2024-12-06

## 2024-11-22 RX ORDER — KETOROLAC TROMETHAMINE 30 MG/ML
30 INJECTION, SOLUTION INTRAMUSCULAR; INTRAVENOUS EVERY 6 HOURS PRN
Status: DISCONTINUED | OUTPATIENT
Start: 2024-11-22 | End: 2024-11-22 | Stop reason: DRUGHIGH

## 2024-11-22 RX ORDER — METOPROLOL TARTRATE 1 MG/ML
2.5 INJECTION, SOLUTION INTRAVENOUS ONCE
Status: DISCONTINUED | OUTPATIENT
Start: 2024-11-22 | End: 2024-11-22 | Stop reason: HOSPADM

## 2024-11-22 RX ORDER — METOCLOPRAMIDE HYDROCHLORIDE 5 MG/ML
10 INJECTION INTRAMUSCULAR; INTRAVENOUS EVERY 8 HOURS PRN
Status: DISCONTINUED | OUTPATIENT
Start: 2024-11-22 | End: 2024-11-23

## 2024-11-22 RX ORDER — SODIUM CHLORIDE 9 MG/ML
INJECTION, SOLUTION INTRAVENOUS CONTINUOUS
Status: DISCONTINUED | OUTPATIENT
Start: 2024-11-22 | End: 2024-11-23

## 2024-11-22 RX ORDER — MEPERIDINE HYDROCHLORIDE 25 MG/ML
12.5 INJECTION INTRAMUSCULAR; INTRAVENOUS; SUBCUTANEOUS AS NEEDED
Status: DISCONTINUED | OUTPATIENT
Start: 2024-11-22 | End: 2024-11-22 | Stop reason: HOSPADM

## 2024-11-22 RX ORDER — MORPHINE SULFATE 2 MG/ML
1 INJECTION, SOLUTION INTRAMUSCULAR; INTRAVENOUS EVERY 2 HOUR PRN
Status: DISCONTINUED | OUTPATIENT
Start: 2024-11-22 | End: 2024-11-23

## 2024-11-22 RX ORDER — ONDANSETRON 2 MG/ML
4 INJECTION INTRAMUSCULAR; INTRAVENOUS ONCE
Status: COMPLETED | OUTPATIENT
Start: 2024-11-22 | End: 2024-11-22

## 2024-11-22 RX ORDER — TAMSULOSIN HYDROCHLORIDE 0.4 MG/1
0.4 CAPSULE ORAL DAILY
Qty: 7 CAPSULE | Refills: 0 | Status: CANCELLED | OUTPATIENT
Start: 2024-11-22 | End: 2024-11-29

## 2024-11-22 RX ORDER — PHENAZOPYRIDINE HYDROCHLORIDE 100 MG/1
100 TABLET, FILM COATED ORAL 3 TIMES DAILY PRN
Qty: 10 TABLET | Refills: 0 | Status: SHIPPED | OUTPATIENT
Start: 2024-11-22

## 2024-11-22 RX ORDER — HYDROCODONE BITARTRATE AND ACETAMINOPHEN 5; 325 MG/1; MG/1
1-2 TABLET ORAL EVERY 6 HOURS PRN
Qty: 10 TABLET | Refills: 0 | Status: CANCELLED | OUTPATIENT
Start: 2024-11-22 | End: 2024-11-27

## 2024-11-22 RX ORDER — METOCLOPRAMIDE HYDROCHLORIDE 5 MG/ML
10 INJECTION INTRAMUSCULAR; INTRAVENOUS EVERY 8 HOURS PRN
Status: DISCONTINUED | OUTPATIENT
Start: 2024-11-22 | End: 2024-11-22 | Stop reason: HOSPADM

## 2024-11-22 RX ORDER — MORPHINE SULFATE 2 MG/ML
2 INJECTION, SOLUTION INTRAMUSCULAR; INTRAVENOUS EVERY 2 HOUR PRN
Status: DISCONTINUED | OUTPATIENT
Start: 2024-11-22 | End: 2024-11-23

## 2024-11-22 RX ORDER — SULFAMETHOXAZOLE AND TRIMETHOPRIM 800; 160 MG/1; MG/1
1 TABLET ORAL 2 TIMES DAILY
Qty: 6 TABLET | Refills: 0 | Status: SHIPPED | OUTPATIENT
Start: 2024-11-22 | End: 2024-11-25

## 2024-11-22 RX ORDER — NALOXONE HYDROCHLORIDE 0.4 MG/ML
80 INJECTION, SOLUTION INTRAMUSCULAR; INTRAVENOUS; SUBCUTANEOUS AS NEEDED
Status: DISCONTINUED | OUTPATIENT
Start: 2024-11-22 | End: 2024-11-22 | Stop reason: HOSPADM

## 2024-11-22 RX ORDER — ONDANSETRON 2 MG/ML
4 INJECTION INTRAMUSCULAR; INTRAVENOUS EVERY 6 HOURS PRN
Status: DISCONTINUED | OUTPATIENT
Start: 2024-11-22 | End: 2024-11-23

## 2024-11-22 RX ORDER — HYDROMORPHONE HYDROCHLORIDE 1 MG/ML
0.2 INJECTION, SOLUTION INTRAMUSCULAR; INTRAVENOUS; SUBCUTANEOUS EVERY 5 MIN PRN
Status: DISCONTINUED | OUTPATIENT
Start: 2024-11-22 | End: 2024-11-22 | Stop reason: HOSPADM

## 2024-11-22 RX ORDER — ONDANSETRON 2 MG/ML
INJECTION INTRAMUSCULAR; INTRAVENOUS AS NEEDED
Status: DISCONTINUED | OUTPATIENT
Start: 2024-11-22 | End: 2024-11-22 | Stop reason: SURG

## 2024-11-22 RX ORDER — HYDROCODONE BITARTRATE AND ACETAMINOPHEN 5; 325 MG/1; MG/1
2 TABLET ORAL ONCE AS NEEDED
Status: DISCONTINUED | OUTPATIENT
Start: 2024-11-22 | End: 2024-11-22 | Stop reason: HOSPADM

## 2024-11-22 RX ORDER — KETOROLAC TROMETHAMINE 15 MG/ML
15 INJECTION, SOLUTION INTRAMUSCULAR; INTRAVENOUS EVERY 6 HOURS PRN
Status: DISCONTINUED | OUTPATIENT
Start: 2024-11-22 | End: 2024-11-23

## 2024-11-22 RX ORDER — ROSUVASTATIN CALCIUM 10 MG/1
10 TABLET, COATED ORAL DAILY
COMMUNITY
Start: 2024-08-21

## 2024-11-22 RX ORDER — MORPHINE SULFATE 4 MG/ML
4 INJECTION, SOLUTION INTRAMUSCULAR; INTRAVENOUS EVERY 2 HOUR PRN
Status: DISCONTINUED | OUTPATIENT
Start: 2024-11-22 | End: 2024-11-23

## 2024-11-22 RX ORDER — KETOROLAC TROMETHAMINE 30 MG/ML
INJECTION, SOLUTION INTRAMUSCULAR; INTRAVENOUS AS NEEDED
Status: DISCONTINUED | OUTPATIENT
Start: 2024-11-22 | End: 2024-11-22 | Stop reason: SURG

## 2024-11-22 RX ORDER — ACETAMINOPHEN 500 MG
500 TABLET ORAL EVERY 4 HOURS PRN
Status: DISCONTINUED | OUTPATIENT
Start: 2024-11-22 | End: 2024-11-23

## 2024-11-22 RX ORDER — SODIUM PHOSPHATE, DIBASIC AND SODIUM PHOSPHATE, MONOBASIC 7; 19 G/230ML; G/230ML
1 ENEMA RECTAL ONCE AS NEEDED
Status: DISCONTINUED | OUTPATIENT
Start: 2024-11-22 | End: 2024-11-23

## 2024-11-22 RX ORDER — POLYETHYLENE GLYCOL 3350 17 G/17G
17 POWDER, FOR SOLUTION ORAL DAILY PRN
Status: DISCONTINUED | OUTPATIENT
Start: 2024-11-22 | End: 2024-11-23

## 2024-11-22 RX ORDER — MIDAZOLAM HYDROCHLORIDE 1 MG/ML
1 INJECTION INTRAMUSCULAR; INTRAVENOUS EVERY 5 MIN PRN
Status: DISCONTINUED | OUTPATIENT
Start: 2024-11-22 | End: 2024-11-22 | Stop reason: HOSPADM

## 2024-11-22 RX ORDER — HEPARIN SODIUM 5000 [USP'U]/ML
5000 INJECTION, SOLUTION INTRAVENOUS; SUBCUTANEOUS EVERY 12 HOURS SCHEDULED
Status: DISCONTINUED | OUTPATIENT
Start: 2024-11-22 | End: 2024-11-23

## 2024-11-22 RX ORDER — SODIUM CHLORIDE, SODIUM LACTATE, POTASSIUM CHLORIDE, CALCIUM CHLORIDE 600; 310; 30; 20 MG/100ML; MG/100ML; MG/100ML; MG/100ML
INJECTION, SOLUTION INTRAVENOUS CONTINUOUS
Status: DISCONTINUED | OUTPATIENT
Start: 2024-11-22 | End: 2024-11-22 | Stop reason: HOSPADM

## 2024-11-22 RX ORDER — HYDROMORPHONE HYDROCHLORIDE 1 MG/ML
0.6 INJECTION, SOLUTION INTRAMUSCULAR; INTRAVENOUS; SUBCUTANEOUS EVERY 5 MIN PRN
Status: DISCONTINUED | OUTPATIENT
Start: 2024-11-22 | End: 2024-11-22 | Stop reason: HOSPADM

## 2024-11-22 RX ORDER — HYDROCODONE BITARTRATE AND ACETAMINOPHEN 5; 325 MG/1; MG/1
1 TABLET ORAL ONCE AS NEEDED
Status: DISCONTINUED | OUTPATIENT
Start: 2024-11-22 | End: 2024-11-22 | Stop reason: HOSPADM

## 2024-11-22 RX ORDER — ONDANSETRON 4 MG/1
4 TABLET, ORALLY DISINTEGRATING ORAL EVERY 6 HOURS PRN
Status: DISCONTINUED | OUTPATIENT
Start: 2024-11-22 | End: 2024-11-23

## 2024-11-22 RX ADMIN — ONDANSETRON 4 MG: 2 INJECTION INTRAMUSCULAR; INTRAVENOUS at 18:15:00

## 2024-11-22 RX ADMIN — DEXAMETHASONE SODIUM PHOSPHATE 8 MG: 4 MG/ML VIAL (ML) INJECTION at 17:29:00

## 2024-11-22 RX ADMIN — METOCLOPRAMIDE HYDROCHLORIDE 10 MG: 5 INJECTION INTRAMUSCULAR; INTRAVENOUS at 17:29:00

## 2024-11-22 RX ADMIN — KETOROLAC TROMETHAMINE 15 MG: 30 INJECTION, SOLUTION INTRAMUSCULAR; INTRAVENOUS at 18:15:00

## 2024-11-22 RX ADMIN — SODIUM CHLORIDE: 9 INJECTION, SOLUTION INTRAVENOUS at 17:23:00

## 2024-11-22 RX ADMIN — SODIUM CHLORIDE: 9 INJECTION, SOLUTION INTRAVENOUS at 18:29:00

## 2024-11-22 NOTE — DISCHARGE INSTRUCTIONS
Please use a strainer to urinate to catch the kidney stone.  If your pain worsens or progresses while taking the medications that are prescribed to you please return to the ER for further evaluation.  You can take ibuprofen 600 mg every 6 hours as needed for pain control.  Also take Flomax daily.  Take ondansetron as prescribed as needed for nausea and vomiting.  Take hydrocodone acetaminophen 5/3/2025 1 to 2 tablets every 6 hours as needed for pain control.  This medication will make you drowsy and constipated do not take it and drive or operate heavy machinery.  If you are experiencing constipation from the medication take docusate over-the-counter stool softeners.  Drink more fluids and eat higher fiber diet.      You had cystoscopy, ureteroscopy, and stent placement in the operating room today.    Instructions:    - No heavy lifting or strenuous activity for 1 day. You may resume regular activity tomorrow.    - The office will contact you to make your post-operative clinic appointment in 7-10 days to remove your stent. If you do not hear from the clinic after a few days or you need to change your appointment time or date, please contact us at [phone].     -  You have a stent (small plastic tube) inside your kidney and ureter to allow the swelling from surgery to resolve. This is only temporary and must be removed, so you should not forget about it. We will remove your stent via a brief cystoscopy in clinic when you return. If you have to miss this appointment for some reason please make sure you re-schedule it.     - Take the antibiotics for 3 days       - With a ureteral stent in place you may have some discomfort on your side/flank. You can feel pain worsen when you urinate, so try to avoid holding urine and void every 2-3 hours. You also may notice increased blood in the urine as you increase your activity. If this happens increase your hydration and take it easy for a day. Warm baths/hot packs can help  with the discomfort as well as your prescribed medications and Advil/Motrin (if you are able to take these).     - You may experience mild pain after the procedure for a few days.  If the pain becomes intolerable please contact our office or go to the nearest Emergency Room or Urgent Care. You should take over the counter ibuprofen (AKA motrin, advil) for mild pain (provided you do not have a medical condition such as stomach ulcers or kidney disease which prohibits you from taking these). You may alternate this with tylenol as well. If pain is still not relieved by tylenol and/or ibuprofen, you may take narcotic pain medication if prescribed (typically oxycodone or tramadol). If you are taking narcotic pain medication this can make you constipated, so you should take over the counter stool softeners or miralax if prescribed.    - Warm pack or hot baths often help with discomfort after cystoscopy.    - If you take blood thinners (such as aspirin or plavix) please hold these medications until *** days after surgery.     - You may experience burning and frequency of urination over the next few days. This will improve after a few days if you stay well hydrated. If you were prescribed phenazopyridine (Pyridium) this may relieve urinary discomfort but you can only take this for 3 days. Pyridium will make your urine orange.     - You are likely to see some blood in your urine (pink or light red urine) that should clear up within a few days. Staying well hydrated should help this clear up. If you notice the urine stays dark red or there are multiple large blood clots despite good hydration, please call the urology clinic ([phone]).     - Try to abstain from alcohol, coffee, tea, artificial sweeteners, and spicy food for the next 48 hours as these can irritate the bladder.     - If you develop fevers / chills, difficulty urinating, or abdominal pain that does not improve with pain medications, please call the office.      - Drink 1.5 to 2 liters of fluid today (water is preferable). If you are on a fluid restriction due to other medical reasons then you need to adhere to your fluid restriction recommendations.      Otis Babin MD  Staff Urologist  [facility]  Office: [phone]

## 2024-11-22 NOTE — ANESTHESIA PROCEDURE NOTES
Airway  Date/Time: 11/22/2024 5:30 PM  Urgency: elective      General Information and Staff    Patient location during procedure: OR  Anesthesiologist: Gagan Combs MD  Performed: anesthesiologist   Performed by: Gagan Combs MD  Authorized by: Gagan Combs MD      Indications and Patient Condition  Indications for airway management: anesthesia  Sedation level: deep  Preoxygenated: yes  Patient position: sniffing  Mask difficulty assessment: 1 - vent by mask    Final Airway Details  Final airway type: supraglottic airway      Successful airway: classic  Size 4       Number of attempts at approach: 1

## 2024-11-22 NOTE — ED QUICK NOTES
Pt is going to [facility] by private car. Private car form given and explained to the pt. Pt stated he understood the teaching; form signed by the pt. IV wrapped prior to leaving the unit. Pt left the unit at this time with his wife.

## 2024-11-22 NOTE — CONSULTS
[facility] [place]    Report of Consultation    Adam Villasenor Patient Status:  Emergency    4/3/1955 MRN [id number]   Location [facility] [place] Attending Lety Michelle DO   Hosp Day # 0 PCP Lucia Walls DO     Date of Admission:  2024  Date of Consult:  2024    Reason for Consultation:  Ureteral stone     History of Present Illness:  Adam Villasenor is a a(n) 69 year old male with hx of CAD s/p PCI, HTN, NAFLD, HL, FRANKLIN on CPAP, who presented to the ED for LLQ pain with associated nausea. He was afebrile and hemodynamically stable at time of arrival. Labs unremarkable. Urinalysis >10 RBC/hpf.  CT scan revealed ~8 mm left proximal ureteral stone with hydronephrosis, no additional urolithiasis noted on review of images. Patient was admitted for pain control and further evaluation. Urology consulted.    Developed pain this morning with some mild nausea, no vomiting and no urinary complaints. Denies gross hematuria, dysuria. No fevers. No prior stones.     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    Disorder of liver    NAFLD    Essential hypertension    Hearing impairment    Deaf in left ear    High blood pressure    High cholesterol    Hyperlipidemia    Memory problem    Sleep apnea    CPAP    Visual impairment    Glasses     Past Surgical History:   Procedure Laterality Date    Cath drug eluting stent      Colonoscopy  10/03/2016    normal, repeat in 3 yrs due to h/o polyps and family h/o colon cancer    Colonoscopy N/A 10/03/2016    Procedure: COLONOSCOPY;  Surgeon: Hakan Lui MD;  Location:  ENDOSCOPY    Other      Heart stent     Family History   Problem Relation Age of Onset    Cancer Father 82        colon     Psychiatric Son         depression    Other (Other) Paternal Grandmother         glaucoma       reports  that he has never smoked. He has never been exposed to tobacco smoke. He has never used smokeless tobacco. He reports current alcohol use of about 2.0 standard drinks of alcohol per week. He reports current drug use. Drug: Cannabis.    Allergies:  Allergies[1]    Medications:  No current facility-administered medications for this encounter.    Review of Systems:  Pertinent items are noted in HPI.    Physical Exam:  /76   Pulse 65   Temp 98.2 °F (36.8 °C)   Resp 16   Ht 5' 9\" (1.753 m)   Wt 185 lb (83.9 kg)   SpO2 96%   BMI 27.32 kg/m²   General appearance: alert, appears stated age, cooperative, and no distress  Head: Normocephalic, without obvious abnormality, atraumatic  Neck: supple, symmetrical, trachea midline  Back:  no CVAT  Lungs: non labored respirations  Abdomen: tenderness with deep palpation left upper lateral, no lower abdominal tenderness  Neurologic: Grossly normal  Psych appropriate affect and mood    Laboratory Data:  Lab Results   Component Value Date    WBC 10.1 11/22/2024    HGB 14.8 11/22/2024    HCT 42.6 11/22/2024    .0 11/22/2024    CREATSERUM 0.87 11/22/2024    BUN 18 11/22/2024     11/22/2024    K 3.7 11/22/2024     11/22/2024    CO2 24.0 11/22/2024     11/22/2024    CA 9.6 11/22/2024    ALB 4.7 11/22/2024    ALKPHO 88 11/22/2024    BILT 0.8 11/22/2024    TP 7.1 11/22/2024    AST 25 11/22/2024    ALT 43 11/22/2024       Urinalysis Results (last three years):  Recent Labs     11/22/24  0614   COLORUR Yellow   CLARITY Clear   SPECGRAVITY >=1.030   PHURINE 5.0   PROUR Trace*   GLUUR Negative   KETUR Negative   BILUR Negative   BLOODURINE Large*   NITRITE Negative   UROBILINOGEN 0.2   LEUUR Negative   WBCUR 1-5   RBCUR >10*   BACUR Rare*       Urine Culture Results (last three years):  No results found for: \"URINECUL\"    Imaging  No results found.        Impression:  Patient Active Problem List   Diagnosis    History of placement of stent in anterior  descending branch of left coronary artery    Presence of drug coated stent in LAD coronary artery    Atherosclerotic heart disease of native coronary artery without angina pectoris    Essential hypertension    FRANKLIN (obstructive sleep apnea)    Hip pain    Arthralgia of right wrist    Chronic fatigue    Ureterolithiasis       69 year old male with hx of CAD s/p PCI, HTN, NAFLD, HL, FRANKLIN on CPAP, who presented to the ED for LLQ pain with associated nausea. He was afebrile and hemodynamically stable at time of arrival. Labs unremarkable. Urinalysis >10 RBC/hpf.  CT scan revealed ~8 mm left proximal ureteral stone with hydronephrosis, no additional urolithiasis noted on review of images. Patient was admitted for pain control and further evaluation. Urology consulted.    Surgical risks, benefits and alternatives discussed.    Risks of ureteroscopy including but not limited to infection, bleeding, anesthetic issues, possible need for stent, need for subsequent removal of stent, ureteral spasm, ureteral injury or stricture discussed with patient.     Recommendations:  Remain NPO. Continue IVF, strain all urine.  Obtain consent for cystoscopy, left retrograde pyelogram, left ureteroscopy, laser lithotripsy, stone extraction, left ureteral stent placement with Dr. Otis Babin.     Thank you for allowing me to participate in the care of your patient.    Stephanie Eldridge PA-C  [facility]  11/22/2024           [1] No Known Allergies

## 2024-11-22 NOTE — ED QUICK NOTES
Orders for admission, patient is aware of plan and ready to go upstairs. Any questions, please call ED ERICK Veloz at extension [phone].     Patient Covid vaccination status: Fully vaccinated     COVID Test Ordered in ED: None    COVID Suspicion at Admission: N/A    Running Infusions:  None    Mental Status/LOC at time of transport: Aox3    Other pertinent information:   CIWA score: N/A   NIH score:  N/A

## 2024-11-22 NOTE — ED PROVIDER NOTES
Patient Seen in: [facility] [place]      History     Chief Complaint   Patient presents with    Abdomen/Flank Pain     Stated Complaint: abdominal pain    Subjective:   HPI      69-year-old male past medical history of CAD hypertension hyperlipidemia presents ED with complaints of left-sided lower abdominal pain began an hour prior to arrival complaining of nausea no vomiting no .  Had a BM that made it worse.    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    Disorder of liver    NAFLD    Essential hypertension    Hearing impairment    Deaf in left ear    High blood pressure    High cholesterol    Hyperlipidemia    Memory problem    Sleep apnea    CPAP    Visual impairment    Glasses              Past Surgical History:   Procedure Laterality Date    Cath drug eluting stent      Colonoscopy  10/03/2016    normal, repeat in 3 yrs due to h/o polyps and family h/o colon cancer    Colonoscopy N/A 10/03/2016    Procedure: COLONOSCOPY;  Surgeon: Hakan Lui MD;  Location:  ENDOSCOPY    Other      Heart stent                Social History     Socioeconomic History    Marital status:    Tobacco Use    Smoking status: Never     Passive exposure: Never    Smokeless tobacco: Never    Tobacco comments:     Updated 5/1/24   Vaping Use    Vaping status: Never Used   Substance and Sexual Activity    Alcohol use: Yes     Alcohol/week: 2.0 standard drinks of alcohol     Types: 2 Cans of beer per week     Comment: 2 drinks per week    Drug use: Yes     Types: Cannabis     Comment: Approximately once per month   Other Topics Concern    Caffeine Concern Yes     Comment: 2-3 cups a day    Exercise Yes     Comment: 1 x a week     Social Drivers of Health     Physical Activity: High Risk (8/21/2024)    Received from [facility]    Exercise Vital Sign     On average, how many  days per week do you engage in moderate to strenuous exercise (like a brisk walk)?: 0 days     On average, how many minutes do you engage in exercise at this level?: 0 min                  Physical Exam     ED Triage Vitals [11/22/24 0433]   BP (!) 164/82   Pulse 65   Resp 14   Temp    Temp src Oral   SpO2 99 %   O2 Device None (Room air)       Current Vitals:   Vital Signs  BP: (!) 164/82  Pulse: 65  Resp: 14  Temp src: Oral    Oxygen Therapy  SpO2: 99 %  O2 Device: None (Room air)        Physical Exam  Vitals and nursing note reviewed.   Constitutional:       Appearance: He is well-developed.   HENT:      Head: Normocephalic and atraumatic.   Eyes:      Pupils: Pupils are equal, round, and reactive to light.   Cardiovascular:      Rate and Rhythm: Normal rate and regular rhythm.   Pulmonary:      Effort: Pulmonary effort is normal.      Breath sounds: Normal breath sounds.   Abdominal:      General: Bowel sounds are normal.      Palpations: Abdomen is soft.      Comments: TTP left lower quadrant   Musculoskeletal:      Cervical back: Normal range of motion and neck supple.   Skin:     General: Skin is warm and dry.      Capillary Refill: Capillary refill takes less than 2 seconds.   Neurological:      Mental Status: He is alert and oriented to person, place, and time.   Psychiatric:         Behavior: Behavior normal.             ED Course     Labs Reviewed   COMP METABOLIC PANEL (14) - Abnormal; Notable for the following components:       Result Value    Glucose 105 (*)     All other components within normal limits   CBC WITH DIFFERENTIAL WITH PLATELET   URINALYSIS WITH CULTURE REFLEX            CT abdomen/pelvis      IMPRESSION:    Obstructing 4.5 x 7.0 x 7.5 mm calculus in the proximal left ureter near the UPJ resulting in mild to moderate hydronephrosis.    No bowel obstruction or free gas  No diverticulitis or appendicitis  No calcified gallstones or ductal dilatation  Calcified splenic and hepatic  granulomas  Normal pancreas    Subpleural scarring in the lung bases  No AAA         MDM      69-year-old male presents ED with complaint of left lower quadrant abdominal pain.  Vital stable arrival except mild hypertension possible reaction to pain patient appears nontoxic examination.  Abdomen with left upper quadrant tenderness palpation.  Will obtain basic labs CT abdomen pelvis rule out diverticulitis or colitis versus acute appendicitis versus ureterolithiasis with  pyelonephritis.  CT scan concerning for 4.5 x 7 x 7.5 mm calculus in the proximal left ureter.  Pain 4 out of 10 after ketorolac 15 mg IV.  Given size of stone as well as patient's overall discomfort will admit for  surgical evaluation by urology and pain control. Case discussed with hospitalist who agrees to admission.  Pending UA. Discussed with my partner to f/u on results.      Admission disposition: 11/22/2024  6:02 AM           Medical Decision Making      Disposition and Plan     Clinical Impression:  1. Ureterolithiasis         Disposition:  Admit  11/22/2024  6:02 am    Follow-up:  [facility]   [address] [place] [address] [id number] [phone]  Call in 1 day(s)            Medications Prescribed:  Current Discharge Medication List              Supplementary Documentation:         Hospital Problems       Present on Admission  Date Reviewed: 8/15/2024            ICD-10-CM Noted POA    * (Principal) Ureterolithiasis N20.1 11/22/2024 Unknown

## 2024-11-22 NOTE — H&P
[facility]     Adam Villasenor Patient Status:  Observation    4/3/1955 MRN [id number]   Location [facility] 3SW-A Attending Christo Du*   Hosp Day # 0 PCP Lucia Walls DO     Chief Complaint: Left lower quadrant abdominal pain and nausea    Subjective:    History of Present Illness:     Adam Villasenor is a 69 year old male with w/ PMHx Htn, HLD, CAD presents to the hospital for concerns of LLQ pain.  Patient states pain started earlier today with some associated nausea but no vomiting.  Patient overall denies any dysuria, constipation, diarrhea, fevers, chills.  He denies any chest pain shortness of breath.      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    Disorder of liver    NAFLD    Essential hypertension    Hearing impairment    Deaf in left ear    High blood pressure    High cholesterol    Hyperlipidemia    Memory problem    Sleep apnea    CPAP    Visual impairment    Glasses     Past Surgical History:   Past Surgical History:   Procedure Laterality Date    Cath drug eluting stent      Colonoscopy  10/03/2016    normal, repeat in 3 yrs due to h/o polyps and family h/o colon cancer    Colonoscopy N/A 10/03/2016    Procedure: COLONOSCOPY;  Surgeon: Hakan Lui MD;  Location:  ENDOSCOPY    Other      Heart stent      Family History:   Family History   Problem Relation Age of Onset    Cancer Father 82        colon     Psychiatric Son         depression    Other (Other) Paternal Grandmother         glaucoma      Social History:    reports that he has never smoked. He has never been exposed to tobacco smoke. He has never used smokeless tobacco. He reports current alcohol use of about 2.0 standard drinks of alcohol per week. He  reports current drug use. Drug: Cannabis.     Allergies: Allergies[1]    Medications:  Medications Ordered Prior to Encounter[2]    Review of Systems:   A comprehensive review of systems was completed.    Pertinent positives and negatives noted in the HPI.    Objective:   Physical Exam:    /68 (BP Location: Right arm)   Pulse 63   Temp 97.7 °F (36.5 °C) (Oral)   Resp 16   Ht 5' 9\" (1.753 m)   Wt 185 lb (83.9 kg)   SpO2 96%   BMI 27.32 kg/m²   General: No acute distress, Alert  Respiratory: No rhonchi, no wheezes  Cardiovascular: S1, S2. Regular rate and rhythm  Abdomen: Soft, Non-tender, non-distended, positive bowel sounds  Neuro: No new focal deficits  Extremities: No edema      Results:    Labs:      Labs Last 24 Hours:    Recent Labs   Lab 11/22/24  0440   RBC 4.36   HGB 14.8   HCT 42.6   MCV 97.7   MCH 33.9   MCHC 34.7   RDW 14.4   NEPRELIM 7.57   WBC 10.1   .0       Recent Labs   Lab 11/22/24  0440   *   BUN 18   CREATSERUM 0.87   EGFRCR 93   CA 9.6   ALB 4.7      K 3.7      CO2 24.0   ALKPHO 88   AST 25   ALT 43   BILT 0.8   TP 7.1       No results found for: \"PT\", \"INR\"    No results for input(s): \"TROP\", \"TROPHS\", \"CK\" in the last 168 hours.    No results for input(s): \"TROP\", \"PBNP\" in the last 168 hours.    No results for input(s): \"PCT\" in the last 168 hours.    Imaging: Imaging data reviewed in Epic.    Assessment & Plan:      #Mild to moderate hydronephrosis of left kidney  #8mm obstructing stone in proximal left ureter  -urology to see  -pain control, iv fluids, antiemetics    #Essential hypertension    #Hyperlipidemia    #Nonobstructive coronary artery disease    #ACP  -full code, 18 mins spent face to face with patient. This was a voluntary question. We reviwed terms like cardiac arrest, losing a pulse, code blue, acls. Order is now updated on epic.       Plan of care discussed with staff, patient    Niket Green, DO    Supplementary Documentation:     The 21st  Century Cures Act makes medical notes like these available to patients in the interest of transparency. Please be advised this is a medical document. Medical documents are intended to carry relevant information, facts as evident, and the clinical opinion of the practitioner. The medical note is intended as peer to peer communication and may appear blunt or direct. It is written in medical language and may contain abbreviations or verbiage that are unfamiliar.                                       [1] No Known Allergies  [2]   No current facility-administered medications on file prior to encounter.     Current Outpatient Medications on File Prior to Encounter   Medication Sig Dispense Refill    rosuvastatin 10 MG Oral Tab Take 1 tablet (10 mg total) by mouth daily.      METOPROLOL SUCCINATE ER 25 MG Oral Tablet 24 Hr Take 1 tablet (25 mg total) by mouth daily. 90 tablet 0    omega-3 fatty acids 1000 MG Oral Cap Take 1,000 mg by mouth daily.      Ascorbic Acid (VITAMIN C) 1000 MG Oral Tab Take 1 tablet (1,000 mg total) by mouth daily.      albuterol 108 (90 Base) MCG/ACT Inhalation Aero Soln Inhale 2 puffs into the lungs every 6 (six) hours as needed. (Patient not taking: Reported on 8/15/2024) 1 each 0    atorvastatin 20 MG Oral Tab Take 1 tablet (20 mg total) by mouth daily. (Patient not taking: Reported on 11/22/2024) 90 tablet 3

## 2024-11-22 NOTE — PLAN OF CARE
Pt A & O x4, on RA.  /IS.  Tele-SB.  SCDs.  Last BM 11/22.  NPO, OR at 1700.  IVF. Strain urine.  Wife at bedside.  Pt up ad angela.  Morphine/toradol PRN pain.  2 person skin check completed with Osvaldo PCT.  No wounds noted.

## 2024-11-22 NOTE — ANESTHESIA PREPROCEDURE EVALUATION
PRE-OP EVALUATION  PRE-OP EVALUATION    Patient Name: Adam Villasenor    Admit Diagnosis: Ureterolithiasis [N20.1]    Pre-op Diagnosis: Ureteral calculi [N20.1]    CYSTOSCOPY, LEFT RETROGRADE, PYELOGRAM, LEFT URETEROSCOPY, LASER LITHOTRIPSY, INSERTION LEFT URETERAL STENT    Anesthesia Procedure: CYSTOSCOPY, LEFT RETROGRADE, PYELOGRAM, LEFT URETEROSCOPY, LASER LITHOTRIPSY, INSERTION LEFT URETERAL STENT (Left)    Surgeons and Role:     * Otis Babin MD - Primary  Pre-op vitals reviewed.  Temp: 97.8 °F (36.6 °C)  Pulse: 56  Resp: 16  BP: 137/70  SpO2: 96 %  Body mass index is 27.32 kg/m².    Current medications reviewed.  Hospital Medications:   [COMPLETED] ketorolac (Toradol) 15 MG/ML injection 15 mg  15 mg Intravenous Once    [COMPLETED] morphINE PF 4 MG/ML injection 2 mg  2 mg Intravenous Once    [COMPLETED] ondansetron (Zofran) 4 MG/2ML injection 4 mg  4 mg Intravenous Once    [COMPLETED] sodium chloride 0.9 % IV bolus 1,000 mL  1,000 mL Intravenous Once    ketorolac (Toradol) 15 MG/ML injection 15 mg  15 mg Intravenous Q6H PRN    ondansetron (Zofran-ODT) disintegrating tab 4 mg  4 mg Oral Q6H PRN    Or    ondansetron (Zofran) 4 MG/2ML injection 4 mg  4 mg Intravenous Q6H PRN    acetaminophen (Tylenol Extra Strength) tab 500 mg  500 mg Oral Q4H PRN    melatonin tab 3 mg  3 mg Oral Nightly PRN    metoclopramide (Reglan) 5 mg/mL injection 10 mg  10 mg Intravenous Q8H PRN    sodium chloride 0.9% infusion   Intravenous Continuous    heparin (Porcine) 5000 UNIT/ML injection 5,000 Units  5,000 Units Subcutaneous 2 times per day    polyethylene glycol (PEG 3350) (Miralax) 17 g oral packet 17 g  17 g Oral Daily PRN    sennosides (Senokot) tab 17.2 mg  17.2 mg Oral Nightly PRN    bisacodyl (Dulcolax) 10 MG rectal suppository 10 mg  10 mg Rectal Daily PRN    fleet enema (Fleet) rectal enema 133 mL  1 enema Rectal Once PRN    morphINE PF 2 MG/ML injection 1 mg  1 mg Intravenous Q2H PRN    Or    morphINE PF 2 MG/ML  injection 2 mg  2 mg Intravenous Q2H PRN    Or    morphINE PF 4 MG/ML injection 4 mg  4 mg Intravenous Q2H PRN    metoprolol succinate ER (Toprol XL) 24 hr tab 25 mg  25 mg Oral Daily Beta Blocker    rosuvastatin (Crestor) tab 10 mg  10 mg Oral Daily    ceFAZolin (Ancef) 2g in 10mL IV syringe premix  2 g Intravenous On Call to OR       Outpatient Medications:     Medications Prior to Admission   Medication Sig Dispense Refill Last Dose/Taking    rosuvastatin 10 MG Oral Tab Take 1 tablet (10 mg total) by mouth daily.   Taking    METOPROLOL SUCCINATE ER 25 MG Oral Tablet 24 Hr Take 1 tablet (25 mg total) by mouth daily. 90 tablet 0 Taking    omega-3 fatty acids 1000 MG Oral Cap Take 1,000 mg by mouth daily.   Taking    Ascorbic Acid (VITAMIN C) 1000 MG Oral Tab Take 1 tablet (1,000 mg total) by mouth daily.   Taking    albuterol 108 (90 Base) MCG/ACT Inhalation Aero Soln Inhale 2 puffs into the lungs every 6 (six) hours as needed. (Patient not taking: Reported on 8/15/2024) 1 each 0     atorvastatin 20 MG Oral Tab Take 1 tablet (20 mg total) by mouth daily. (Patient not taking: Reported on 11/22/2024) 90 tablet 3 Not Taking       Allergies: Patient has no known allergies.      Anesthesia Evaluation    Patient summary reviewed.    Anesthetic Complications  (-) history of anesthetic complications         GI/Hepatic/Renal    Negative GI/hepatic/renal ROS.            (+) liver disease                 Cardiovascular        Exercise tolerance: good     MET: >4      (+) hypertension and well controlled    (+) CAD    (+) CABG/stent                            Endo/Other    Negative endo/other ROS.                              Pulmonary                    (+) sleep apnea and CPAP      Neuro/Psych    Negative neuro/psych ROS.                                  Past Surgical History:   Procedure Laterality Date    Cath drug eluting stent      Colonoscopy  10/03/2016    normal, repeat in 3 yrs due to h/o polyps and family h/o colon  cancer    Colonoscopy N/A 10/03/2016    Procedure: COLONOSCOPY;  Surgeon: Hakan Lui MD;  Location:  ENDOSCOPY    Other      Heart stent     Social History     Socioeconomic History    Marital status:    Tobacco Use    Smoking status: Never     Passive exposure: Never    Smokeless tobacco: Never    Tobacco comments:     Updated 5/1/24   Vaping Use    Vaping status: Never Used   Substance and Sexual Activity    Alcohol use: Yes     Alcohol/week: 2.0 standard drinks of alcohol     Types: 2 Cans of beer per week     Comment: 2 drinks per week    Drug use: Yes     Types: Cannabis     Comment: Approximately once per month   Other Topics Concern    Caffeine Concern Yes     Comment: 2-3 cups a day    Exercise Yes     Comment: 1 x a week     History   Drug Use    Types: Cannabis     Comment: Approximately once per month     Available pre-op labs reviewed.  Lab Results   Component Value Date    WBC 10.1 11/22/2024    RBC 4.36 11/22/2024    HGB 14.8 11/22/2024    HCT 42.6 11/22/2024    MCV 97.7 11/22/2024    MCH 33.9 11/22/2024    MCHC 34.7 11/22/2024    RDW 14.4 11/22/2024    .0 11/22/2024     Lab Results   Component Value Date     11/22/2024    K 3.7 11/22/2024     11/22/2024    CO2 24.0 11/22/2024    BUN 18 11/22/2024    CREATSERUM 0.87 11/22/2024     (H) 11/22/2024    CA 9.6 11/22/2024            Airway      Mallampati: II  Mouth opening: 3 FB  TM distance: 4 - 6 cm  Neck ROM: full Cardiovascular    Cardiovascular exam normal.         Dental    Dentition appears grossly intact         Pulmonary    Pulmonary exam normal.                 Other findings              ASA: 2   Plan: general  NPO status verified and patient meets guidelines.    Post-procedure pain management plan discussed with surgeon and patient.      Plan/risks discussed with: patient                Present on Admission:  **None**

## 2024-11-22 NOTE — ED PROVIDER NOTES
I reviewed the urinalysis.  Nitrite negative.  Leuk esterase negative.  Large blood.  Greater than 10 RBCs.    Urine culture pending.      Patient admitted per Dr. Oscar

## 2024-11-23 NOTE — TELEPHONE ENCOUNTER
This patient needs to be scheduled for a stent removal with me in clinic the week of Dec 2- OK to overbook as needed.   Thanks  SD

## 2024-11-23 NOTE — ANESTHESIA POSTPROCEDURE EVALUATION
[facility]    Adam Villasenor Patient Status:  Observation   Age/Gender 69 year old male MRN [id number]   Location [facility] POST ANESTHESIA CARE UNIT Attending Christo Du*   Hosp Day # 0 PCP Lucia Walls DO       Anesthesia Post-op Note    CYSTOSCOPY, LEFT RETROGRADE PYELOGRAM, LEFT URETEROSCOPY, LASER LITHOTRIPSY, INSERTION LEFT URETERAL STENT    Procedure Summary       Date: 11/22/24 Room / Location:  MAIN OR  /  MAIN OR    Anesthesia Start: 1723 Anesthesia Stop: 1829    Procedure: CYSTOSCOPY, LEFT RETROGRADE PYELOGRAM, LEFT URETEROSCOPY, LASER LITHOTRIPSY, INSERTION LEFT URETERAL STENT (Left: Ureter) Diagnosis:       Ureteral calculi      (Ureteral calculi [N20.1])    Surgeons: Otis Babin MD Anesthesiologist: Gagan Combs MD    Anesthesia Type: general ASA Status: 2            Anesthesia Type: general    Vitals Value Taken Time   /74 11/22/24 1829   Temp 97.8 °F (36.6 °C) 11/22/24 1829   Pulse 72 11/22/24 1829   Resp 17 11/22/24 1829   SpO2 97 % 11/22/24 1829   Vitals shown include unfiled device data.    Patient Location: PACU    Anesthesia Type: general    Airway Patency: patent and extubated    Postop Pain Control: adequate    Mental Status: mildly sedated but able to meaningfully participate in the post-anesthesia evaluation    Nausea/Vomiting: none    Cardiopulmonary/Hydration status: stable euvolemic    Complications: no apparent anesthesia related complications    Postop vital signs: stable    Dental Exam: Unchanged from Preop    Patient to be discharged from PACU when criteria met.

## 2024-11-23 NOTE — PLAN OF CARE
Received the patient back from  PACU . Alert and awake . Vital signs stable . Denies pain . Due to void . Murdock discontinued in PACU . Plan for discharge tonight if stable .

## 2024-11-23 NOTE — PLAN OF CARE
NURSING DISCHARGE NOTE    Discharged Home via Wheelchair.  Accompanied by Family member  Belongings Taken by patient/family.  Patent denies pain . Voided couple of times . Urine is still bloody . Bladder scan was 74 . Dr. Babin and hospitalist was updated . Ok to discharge pt tonight . Discharge instructions given to pt and spouse . Verbalized understanding .

## 2024-11-23 NOTE — OPERATIVE REPORT
Urology Operative Note    Attending Surgeon: Otis Babin MD    Assistant Surgeon: None    Patient Name: Adam Villasenor    Date of Surgery: 11/22/2024    Preoperative Diagnosis: LEFT nephrolithiasis    Postoperative Diagnosis: Same    Procedure Performed:   Cystoscopy, LEFT ureteroscopy, laser lithotripsy, basket stone extraction, retrograde pyelogram, ureteral stent placement    Indication:  Patient is a 69 year old male who presented with a LEFT ureteral stone with pain. The patient was counseled on options, risks, and benefits and elected to undergo the above procedure. We discussed risks including, but not limited to, bleeding, infection, damage to surrounding structures, need for repeat procedure(s). The patient understood these risks and wished to proceed with surgery.    Findings:  Normal urethra. Moderately enlarged prostate. Bladder without lesions; moderately trabeculated.   Left RPG with filling defect at UPJ, no radiopaque stone. Left URS with ~8mm stone now pushed into renal pelvis; laser fragmented/dusted and subsequently basket extracted. No further stones. 6x28 JJ stent placed without issue.     Procedure:  The patient was taken to the operating room and a timeout was performed confirming the correct patient and procedure. The patient was prepped and draped in lithotomy position after undergoing general anesthesia. Pre-operative prophylactic antibiotics were given in the form of Ancef.    The cystoscope was inserted per urethra and the bladder was inspected and drained. The LEFT ureter was cannulated with a Tigertail catheter and advanced to the mid- ureter. A retrograde pyelogram was obtained which demonstrated mild hydronephrosis; filling defect near UPJ; no radiopaque stone. A Sensor wire was then passed through the catheter and into the renal pelvis which I confirmed using fluoroscopy. The catheter and cystoscope were removed.       Then, an 8x10fr dilator was used to dilate the distal ureter  under fluoroscopy over the Sensor wire.  I then placed a second Sensor wire through the dilator and into the renal pelvis under fluoroscopy.   Once both wires were confirmed to be in the kidney I removed the dilator. One wire was then secured to the drapes as a safety wire while the other was our working wire.      Over our working wire we gently introduced a 11/13F x 28cm ureteral access sheath under fluoroscopic visualization to the level of the proximal ureter. This passed easily. Once that was done, we then removed the inner sheath and wire.  The flexible ureteroscope was advanced into the sheath and up into the left renal pelvis.  A systematic inspection of the kidney revealed: ~8mm stone now in renal pelvis. The stone was fragmented using a laser, and then the fragments were extracted using a zero tip basket. Any smaller remaining stone fragments were lasered to dust, and thoroughly irrigated. All renal calyces were surveyed once more, and no large fragments were seen. A retrograde pyelogram was performed through the scope and showed no extravasation. The ureter was inspected while slowly removing the scope and access sheath, and it appeared healthy without stone fragments seen.    We then placed a 6x28  double-J ureteral stent over our safety wire under direct cystoscopic and fluoroscopic guidance. The proximal and distal curls formed appropriately. Stent left without strings. The cystoscope was removed. The procedure was then terminated.    A hernandez catheter was placed due to some friable tissue at the bladder neck and for maximal decompression.    The patient was awoken from anesthesia and transferred to PACU in stable condition. The patient tolerated the procedure well. All instrument/supply counts were correct at the end of the case.    Specimens:   Stone fragments for chemical analysis    Estimated Blood Loss:  2 mL    Tubes/Drains:  6-Tunisian x 28 cm JJ LEFT ureteral stent  20fr hernandez     Complications:    None immediate    Condition from OR:  Stable    Plan:   VT in 1 hour  If tolerating diet, pain controlled, voiding then OK to dc this evening  Will return for stent removal in 7-10 days   Abx x3 days        Otis Babin MD  Staff Urologist  [facility]  Office: [phone]

## 2024-12-01 LAB
CAOX DIHYDRATE: 30 %
CAOX MONOHYDRATE: 70 %
WEIGHT-STONE: 50 MG

## 2024-12-01 NOTE — PROGRESS NOTES
Clinic Procedure Note    INDICATIONS:     Adam Villasenor is a a(n) 69 year old male with hx of CAD s/p PCI, HTN, NAFLD, HL, FRANKLIN on CPAP, who presented to the ED for LLQ pain with associated nausea.  He was found to have a 8 mm left ureteral stone.  No other stones were noted.  He eventually underwent ureteroscopy on .    Taken to OR on  ;   Findings:  Normal urethra. Moderately enlarged prostate. Bladder without lesions; moderately trabeculated.   Left RPG with filling defect at UPJ, no radiopaque stone. Left URS with ~8mm stone now pushed into renal pelvis; laser fragmented/dusted and subsequently basket extracted. No further stones. 6x28 JJ stent placed without issue.     Patient has no previous history of kidney stones.  He is here for stent removal.  He has been doing well in the interval.    Stone analysis:   COM/COD      PROCEDURE:       1. Flexible cystoscopy, removal of left ureteral stent (26354)    DATE OF PROCEDURE: 2024     PRE-PROCEDURE DIAGNOSIS: Nephrolithiasis    POST-PROCEDURE DIAGNOSIS: Same     SURGEON: Otis Babin MD    FINDINGS:  Stent successfully removed in its entirety.     PROCEDURE:   Patient was brought to the procedure suite and a time-out was performed identifiying the patient,  and procedure to be performed. The risks and benefits of the procedure were once again discussed with the patient including bleeding, infection, and dysuria. The patient agreed to proceed. The patient did not have any signs or symptoms of active UTI. Prophylactic PO antibiotics were given in clinic.    The patient was placed in supine position on the table and the genital area was prepped and draped in the standard sterile fashion. Urojet was used prior for local anesthesia effect. A flexible cystoscope was inserted per urethra. There were no urethral strictures present. The bladder was entered and the distal coil of the stent was seen protruding from the ureteral orifice. The stent was  grasped with the flexible stent grasper, and then the stent and cystoscope were both removed. The stent was visualized outside the body and confirmed to be intact and fully removed.     There were no complications and the patient tolerated the procedure well.    IMPRESSION:  Successful stent removal after ureteroscopy today.    PLAN:   -Renal/bladder ultrasound  in 8 weeks to ensure no silent hydronephrosis  Declined metabolic workup for now    I discussed general fluid and dietary guidelines to help prevent further stone formation including:    - Fluid consumption of preferably water to make 2-2.5  L/day of urine  - Low sodium consumption  - Adequate calcium consumption (approximately 1200 mg/day)  - Low fat and moderate animal protein consumption  - Limit consumption  of oxalate rich foods   - I encouraged increased dietary intake of citrate with lemon juice (4 oz day)         Return in 3/4 months       Otis Babin MD  Staff Urologist  [facility]  Office: [phone]

## 2024-12-02 ENCOUNTER — PROCEDURE (OUTPATIENT)
Dept: SURGERY | Facility: CLINIC | Age: 69
End: 2024-12-02

## 2024-12-02 DIAGNOSIS — R82.90 URINE FINDING: Primary | ICD-10-CM

## 2024-12-02 DIAGNOSIS — N20.0 KIDNEY STONE: ICD-10-CM

## 2024-12-02 LAB
APPEARANCE: CLEAR
BILIRUBIN: NEGATIVE
GLUCOSE (URINE DIPSTICK): NEGATIVE MG/DL
KETONES (URINE DIPSTICK): NEGATIVE MG/DL
MULTISTIX LOT#: ABNORMAL NUMERIC
NITRITE, URINE: NEGATIVE
PH, URINE: 5.5 (ref 4.5–8)
PROTEIN (URINE DIPSTICK): >=300 MG/DL
SPECIFIC GRAVITY: >=1.03 (ref 1–1.03)
URINE-COLOR: YELLOW
UROBILINOGEN,SEMI-QN: 0.2 MG/DL (ref 0–1.9)

## 2024-12-02 PROCEDURE — 52310 CYSTOSCOPY AND TREATMENT: CPT | Performed by: UROLOGY

## 2024-12-02 PROCEDURE — 81003 URINALYSIS AUTO W/O SCOPE: CPT | Performed by: UROLOGY

## 2024-12-02 RX ORDER — SULFAMETHOXAZOLE AND TRIMETHOPRIM 800; 160 MG/1; MG/1
1 TABLET ORAL ONCE
Status: COMPLETED | OUTPATIENT
Start: 2024-12-02 | End: 2024-12-02

## 2024-12-02 RX ADMIN — SULFAMETHOXAZOLE AND TRIMETHOPRIM 1 TABLET: 800; 160 TABLET ORAL at 10:36:00

## 2024-12-28 ENCOUNTER — HOSPITAL ENCOUNTER (OUTPATIENT)
Age: 69
Discharge: HOME | End: 2024-12-28
Payer: MEDICARE

## 2024-12-28 VITALS
BODY MASS INDEX: 27.4 KG/M2 | DIASTOLIC BLOOD PRESSURE: 68 MMHG | RESPIRATION RATE: 16 BRPM | OXYGEN SATURATION: 97 % | HEART RATE: 71 BPM | HEIGHT: 69 IN | SYSTOLIC BLOOD PRESSURE: 131 MMHG | WEIGHT: 185 LBS | TEMPERATURE: 98 F

## 2024-12-28 DIAGNOSIS — J06.9 VIRAL URI: Primary | ICD-10-CM

## 2024-12-28 LAB
POCT INFLUENZA A: NEGATIVE
POCT INFLUENZA B: NEGATIVE
SARS-COV-2 RNA RESP QL NAA+PROBE: NOT DETECTED

## 2024-12-28 NOTE — ED PROVIDER NOTES
Patient Seen in: [facility]      History     Chief Complaint   Patient presents with    Cough/URI    Sore Throat    Body ache and/or chills    Eye Problem     Stated Complaint: flu like symptoms    Subjective:   69-year-old male presents today with complaints of URI symptoms cough sore throat and bodyaches generalized fatigue.  Symptoms started yesterday.  Denies any nausea vomiting diarrhea.  Alert oriented x 3.  Is afebrile on arrival.  The patient's medication list, past medical history and social history elements as listed in today's nurse's notes were reviewed and agreed (except as otherwise stated in the HPI).  The patient's family history reviewed and determined to be noncontributory to the presenting problem              Objective:     No pertinent past medical history.            No pertinent past surgical history.              No pertinent social history.            Review of Systems    Positive for stated complaint: flu like symptoms  Other systems are as noted in HPI.  Constitutional and vital signs reviewed.      All other systems reviewed and negative except as noted above.    Physical Exam     ED Triage Vitals [12/28/24 1208]   /68   Pulse 71   Resp 16   Temp 98.2 °F (36.8 °C)   Temp src Oral   SpO2 97 %   O2 Device None (Room air)       Current Vitals:   Vital Signs  BP: 131/68  Pulse: 71  Resp: 16  Temp: 98.2 °F (36.8 °C)  Temp src: Oral    Oxygen Therapy  SpO2: 97 %  O2 Device: None (Room air)        Physical Exam  Vitals and nursing note reviewed.   Constitutional:       Appearance: He is well-developed.   HENT:      Head: Normocephalic.      Right Ear: Tympanic membrane and ear canal normal.      Left Ear: Tympanic membrane and ear canal normal.      Nose: Mucosal edema and rhinorrhea present.      Mouth/Throat:      Pharynx: Uvula midline. Posterior oropharyngeal erythema present.   Eyes:      Conjunctiva/sclera: Conjunctivae normal.      Pupils: Pupils are equal, round, and  reactive to light.   Cardiovascular:      Rate and Rhythm: Normal rate and regular rhythm.   Pulmonary:      Effort: Pulmonary effort is normal.      Breath sounds: Normal breath sounds.   Musculoskeletal:      Cervical back: Normal range of motion and neck supple.   Skin:     General: Skin is warm and dry.   Neurological:      Mental Status: He is alert and oriented to person, place, and time.             ED Course     Labs Reviewed   POCT FLU TEST - Normal    Narrative:     This assay is a rapid molecular in vitro test utilizing nucleic acid amplification of influenza A and B viral RNA.   RAPID SARS-COV-2 BY PCR - Normal                   MDM     Please note that this report has been produced using speech recognition software and may contain errors related to that system including, but not limited to, errors in grammar, punctuation, and spelling, as well as words and phrases that possibly may have been recognized inappropriately.  If there are any questions or concerns, contact the dictating provider for clarification.              Medical Decision Making  Differential diagnosis includes but is not limited to: COVID-19, viral URI, strep throat, influenza, pneumonia, sinusitis, bronchitis      Presented today with URI symptoms sore throat cough.  Rapid COVID-19 and flu testing were done and negative.  Do suspect viral cause of illness.  Supportive care was discussed.  To follow with primary care physician in 1 week if symptoms do not improve.  Patient verbalized understanding and agreed to plan of care.    Amount and/or Complexity of Data Reviewed  Labs: ordered. Decision-making details documented in ED Course.     Details: Influenza  COVID    Risk  OTC drugs.        Disposition and Plan     Clinical Impression:  1. Viral URI         Disposition:  Discharge  12/28/2024 12:44 pm    Follow-up:  Lucia Walls DO  [address] [facility] [address]  [phone]    In 1 week  As needed          Medications  Prescribed:  Current Discharge Medication List              Supplementary Documentation:

## 2024-12-28 NOTE — ED INITIAL ASSESSMENT (HPI)
Pt with cough, nasal congestion, sore throat, body aches, and redness in the eyes x 2 days.   Pt was exposed to flu recently    Surgery

## 2025-02-24 ENCOUNTER — APPOINTMENT (OUTPATIENT)
Dept: CARDIOLOGY | Age: 70
End: 2025-02-24

## 2025-02-24 VITALS
HEART RATE: 57 BPM | BODY MASS INDEX: 27.85 KG/M2 | DIASTOLIC BLOOD PRESSURE: 66 MMHG | HEIGHT: 69 IN | SYSTOLIC BLOOD PRESSURE: 103 MMHG | WEIGHT: 188 LBS

## 2025-02-24 DIAGNOSIS — R07.2 CHEST PAIN, PRECORDIAL: ICD-10-CM

## 2025-02-24 DIAGNOSIS — Z86.69 HISTORY OF SLEEP APNEA: Chronic | ICD-10-CM

## 2025-02-24 DIAGNOSIS — E78.00 PURE HYPERCHOLESTEROLEMIA: ICD-10-CM

## 2025-02-24 DIAGNOSIS — I10 ESSENTIAL (PRIMARY) HYPERTENSION: ICD-10-CM

## 2025-02-24 DIAGNOSIS — I25.10 ATHEROSCLEROSIS OF NATIVE CORONARY ARTERY OF NATIVE HEART WITHOUT ANGINA PECTORIS: ICD-10-CM

## 2025-02-24 DIAGNOSIS — R94.39 ABNORMAL CARDIOVASCULAR STRESS TEST: Primary | ICD-10-CM

## 2025-02-24 DIAGNOSIS — Z95.5 PRESENCE OF CORONARY ANGIOPLASTY IMPLANT AND GRAFT: ICD-10-CM

## 2025-02-24 SDOH — HEALTH STABILITY: PHYSICAL HEALTH: ON AVERAGE, HOW MANY MINUTES DO YOU ENGAGE IN EXERCISE AT THIS LEVEL?: 40 MIN

## 2025-02-24 SDOH — HEALTH STABILITY: PHYSICAL HEALTH: ON AVERAGE, HOW MANY DAYS PER WEEK DO YOU ENGAGE IN MODERATE TO STRENUOUS EXERCISE (LIKE A BRISK WALK)?: 1 DAY

## 2025-02-24 ASSESSMENT — PATIENT HEALTH QUESTIONNAIRE - PHQ9
SUM OF ALL RESPONSES TO PHQ9 QUESTIONS 1 AND 2: 0
SUM OF ALL RESPONSES TO PHQ9 QUESTIONS 1 AND 2: 0
2. FEELING DOWN, DEPRESSED OR HOPELESS: NOT AT ALL
CLINICAL INTERPRETATION OF PHQ2 SCORE: NO FURTHER SCREENING NEEDED
1. LITTLE INTEREST OR PLEASURE IN DOING THINGS: NOT AT ALL

## 2025-02-25 ENCOUNTER — TELEPHONE (OUTPATIENT)
Dept: FAMILY MEDICINE CLINIC | Facility: CLINIC | Age: 70
End: 2025-02-25

## 2025-02-25 ENCOUNTER — MED REC SCAN ONLY (OUTPATIENT)
Dept: FAMILY MEDICINE CLINIC | Facility: CLINIC | Age: 70
End: 2025-02-25

## 2025-02-25 DIAGNOSIS — R41.3 MEMORY LOSS: Primary | ICD-10-CM

## 2025-02-25 NOTE — TELEPHONE ENCOUNTER
Patient notified and verbalized understanding.     States he would like to see neurology. States he would like referral info sent via [employer] and will call to schedule when he is back in town    MycClariticst sent

## 2025-02-25 NOTE — TELEPHONE ENCOUNTER
Notes from 2/24/25 appt with [facility], Dr Benavides, reviewed by Dr Walls    Per Dr Walls, can refer to neurology for memory issues

## 2025-02-25 NOTE — TELEPHONE ENCOUNTER
Left message on voicemail/answering machine for patient to call office     Neurology phone number [phone]

## 2025-03-10 ENCOUNTER — TELEPHONE (OUTPATIENT)
Dept: FAMILY MEDICINE CLINIC | Facility: CLINIC | Age: 70
End: 2025-03-10

## 2025-03-10 DIAGNOSIS — I10 ESSENTIAL HYPERTENSION: ICD-10-CM

## 2025-03-10 DIAGNOSIS — Z95.5 PRESENCE OF DRUG COATED STENT IN LAD CORONARY ARTERY: Primary | ICD-10-CM

## 2025-03-10 NOTE — TELEPHONE ENCOUNTER
Patient would like to get labs done per cardio (Dr. Benavides)    Notes are scanned in on 2/25/25    Are we able to place orders so patient can get done at reference lab?    Please adv  Thank you

## 2025-03-12 ENCOUNTER — EXTERNAL LAB (OUTPATIENT)
Dept: HEALTH INFORMATION MANAGEMENT | Facility: OTHER | Age: 70
End: 2025-03-12

## 2025-03-12 ENCOUNTER — LAB ENCOUNTER (OUTPATIENT)
Dept: LAB | Age: 70
End: 2025-03-12
Attending: FAMILY MEDICINE
Payer: MEDICARE

## 2025-03-12 DIAGNOSIS — I10 ESSENTIAL HYPERTENSION: ICD-10-CM

## 2025-03-12 DIAGNOSIS — Z95.5 PRESENCE OF DRUG COATED STENT IN LAD CORONARY ARTERY: ICD-10-CM

## 2025-03-12 LAB
ALBUMIN SERPL-MCNC: 4.9 G/DL (ref 3.2–4.8)
ALBUMIN SERPL-MCNC: 4.9 G/DL (ref 3.2–4.8)
ALBUMIN/GLOB SERPL: 1.9 {RATIO} (ref 1–2)
ALBUMIN/GLOB SERPL: 1.9 {RATIO} (ref 1–2)
ALP LIVER SERPL-CCNC: 80 U/L
ALP SERPL-CCNC: 80 U/L (ref 45–117)
ALT SERPL-CCNC: 26 U/L
ALT SERPL-CCNC: 26 U/L (ref 10–49)
ANION GAP SERPL CALC-SCNC: 12 MMOL/L (ref 0–18)
ANION GAP SERPL CALC-SCNC: 12 MMOL/L (ref 0–18)
AST SERPL-CCNC: 19 U/L
AST SERPL-CCNC: 19 U/L (ref ?–34)
BASOPHILS # BLD AUTO: 0.1 X10(3) UL (ref 0–0.2)
BASOPHILS # BLD: 0.1 X10(3) UL (ref 0–0.2)
BASOPHILS NFR BLD AUTO: 1.1 %
BASOPHILS NFR BLD: 1.1 %
BILIRUB SERPL-MCNC: 1.2 MG/DL (ref 0.2–1.1)
BILIRUB SERPL-MCNC: 1.2 MG/DL (ref 0.2–1.1)
BUN BLD-MCNC: 19 MG/DL (ref 9–23)
BUN SERPL-MCNC: 19 MG/DL (ref 9–23)
CALCIUM BLD-MCNC: 9.7 MG/DL (ref 8.7–10.6)
CALCIUM SERPL-MCNC: 9.7 MG/DL (ref 8.7–10.6)
CALCULATED OSMO: 298 MOSM/KG (ref 275–295)
CHLORIDE SERPL-SCNC: 102 MMOL/L (ref 98–112)
CHLORIDE SERPL-SCNC: 102 MMOL/L (ref 98–112)
CHOLEST SERPL-MCNC: 130 MG/DL
CHOLEST SERPL-MCNC: 130 MG/DL (ref ?–200)
CO2 SERPL-SCNC: 29 MMOL/L (ref 21–32)
CO2 SERPL-SCNC: 29 MMOL/L (ref 21–32)
CREAT BLD-MCNC: 1.06 MG/DL
CREAT SERPL-MCNC: 1.06 MG/DL (ref 0.7–1.3)
EGFRCR SERPLBLD CKD-EPI 2021: 76 ML/MIN/1.73M2 (ref 60–?)
EOSINOPHIL # BLD AUTO: 0.1 X10(3) UL (ref 0–0.7)
EOSINOPHIL # BLD: 0.1 X10(3) UL (ref 0–0.7)
EOSINOPHIL NFR BLD AUTO: 1.1 %
EOSINOPHIL NFR BLD: 1.1 %
ERYTHROCYTE [DISTWIDTH] IN BLOOD BY AUTOMATED COUNT: 14.6 %
ERYTHROCYTE [DISTWIDTH] IN BLOOD: 14.6 %
FASTING PATIENT LIPID ANSWER: YES
FASTING STATUS PATIENT QL REPORTED: YES
GFR SERPLBLD SCHWARTZ-ARVRAT: 76 ML/MIN/1.73M2
GLOBULIN PLAS-MCNC: 2.6 G/DL (ref 2–3.5)
GLOBULIN SER-MCNC: 2.6 G/DL (ref 2–3.5)
GLUCOSE BLD-MCNC: 90 MG/DL (ref 70–99)
GLUCOSE SERPL-MCNC: 90 MG/DL (ref 70–99)
HCT VFR BLD AUTO: 46.7 %
HCT VFR BLD CALC: 46.7 % (ref 39–53)
HDLC SERPL-MCNC: 37 MG/DL (ref 40–59)
HDLC SERPL-MCNC: 37 MG/DL (ref 40–59)
HGB BLD-MCNC: 15.3 G/DL
HGB BLD-MCNC: 15.3 G/DL (ref 13–17.5)
IMM GRANULOCYTES # BLD AUTO: 0.03 X10(3) UL (ref 0–1)
IMM GRANULOCYTES # BLD: 0.03 X10(3) UL (ref 0–1)
IMM GRANULOCYTES NFR BLD: 0.3 %
IMM GRANULOCYTES NFR BLD: 0.3 %
LDLC SERPL CALC-MCNC: 72 MG/DL
LDLC SERPL CALC-MCNC: 72 MG/DL (ref ?–100)
LENGTH OF FAST TIME PATIENT: YES H
LENGTH OF FAST TIME PATIENT: YES H
LYMPHOCYTES # BLD AUTO: 2.13 X10(3) UL (ref 1–4)
LYMPHOCYTES # BLD: 2.13 X10(3) UL (ref 1–4)
LYMPHOCYTES NFR BLD AUTO: 23.7 %
LYMPHOCYTES NFR BLD: 23.7 %
MCH RBC QN AUTO: 33.1 PG (ref 26–34)
MCH RBC QN AUTO: 33.1 PG (ref 26–34)
MCHC RBC AUTO-ENTMCNC: 32.8 G/DL (ref 31–37)
MCHC RBC AUTO-ENTMCNC: 32.8 G/DL (ref 31–37)
MCV RBC AUTO: 101.1 FL
MCV RBC AUTO: 101.1 FL (ref 80–100)
MONOCYTES # BLD AUTO: 0.88 X10(3) UL (ref 0.1–1)
MONOCYTES # BLD: 0.88 X10(3) UL (ref 0.1–1)
MONOCYTES NFR BLD AUTO: 9.8 %
MONOCYTES NFR BLD: 9.8 %
NEUTROPHILS # BLD AUTO: 5.73 X10 (3) UL (ref 1.5–7.7)
NEUTROPHILS # BLD AUTO: 5.73 X10(3) UL (ref 1.5–7.7)
NEUTROPHILS # BLD: 5.73 X10(3) UL (ref 1.5–7.7)
NEUTROPHILS NFR BLD AUTO: 64 %
NEUTROPHILS NFR BLD: 64 %
NONHDLC SERPL-MCNC: 93 MG/DL
NONHDLC SERPL-MCNC: 93 MG/DL (ref ?–130)
OSMOLALITY SERPL CALC.SUM OF ELEC: 298 MOSM/KG (ref 275–295)
PLATELET # BLD AUTO: 348 10(3)UL (ref 150–450)
PLATELET # BLD: 348 10(3)UL (ref 150–450)
POTASSIUM SERPL-SCNC: 4.5 MMOL/L (ref 3.5–5.1)
POTASSIUM SERPL-SCNC: 4.5 MMOL/L (ref 3.5–5.1)
PROT SERPL-MCNC: 7.5 G/DL (ref 5.7–8.2)
PROT SERPL-MCNC: 7.5 G/DL (ref 5.7–8.2)
RBC # BLD AUTO: 4.62 X10(6)UL
RBC # BLD: 4.62 X10(6)UL (ref 3.8–5.8)
SODIUM SERPL-SCNC: 143 MMOL/L (ref 136–145)
SODIUM SERPL-SCNC: 143 MMOL/L (ref 136–145)
TRIGL SERPL-MCNC: 115 MG/DL (ref 30–149)
TRIGL SERPL-MCNC: 115 MG/DL (ref 30–149)
VLDLC SERPL CALC-MCNC: 18 MG/DL (ref 0–30)
VLDLC SERPL CALC-MCNC: 18 MG/DL (ref 0–30)
WBC # BLD AUTO: 9 X10(3) UL (ref 4–11)
WBC # BLD: 9 X10(3) UL (ref 4–11)

## 2025-03-12 PROCEDURE — 80061 LIPID PANEL: CPT

## 2025-03-12 PROCEDURE — 85025 COMPLETE CBC W/AUTO DIFF WBC: CPT

## 2025-03-12 PROCEDURE — 36415 COLL VENOUS BLD VENIPUNCTURE: CPT

## 2025-03-12 PROCEDURE — 80053 COMPREHEN METABOLIC PANEL: CPT

## 2025-03-17 ENCOUNTER — TELEPHONE (OUTPATIENT)
Dept: CARDIOLOGY | Age: 70
End: 2025-03-17

## 2025-04-03 ENCOUNTER — TELEPHONE (OUTPATIENT)
Dept: SURGERY | Facility: CLINIC | Age: 70
End: 2025-04-03

## 2025-04-08 ENCOUNTER — TELEPHONE (OUTPATIENT)
Dept: FAMILY MEDICINE CLINIC | Facility: CLINIC | Age: 70
End: 2025-04-08

## 2025-04-08 NOTE — TELEPHONE ENCOUNTER
PATIENT SPOUSE IS CALLING.  PATIENT IS SCHEDULED WITH DR ALLEN FOR FRIDAY FOR HIS MEDICARE ANNUAL.  PATIENT SPOUSE REQUESTING TO SPEAK TO DR ALLEN BEFORE HIS APPOINTMENT.
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
## 2025-04-11 ENCOUNTER — OFFICE VISIT (OUTPATIENT)
Dept: FAMILY MEDICINE CLINIC | Facility: CLINIC | Age: 70
End: 2025-04-11
Payer: MEDICARE

## 2025-04-11 VITALS
HEART RATE: 99 BPM | RESPIRATION RATE: 20 BRPM | SYSTOLIC BLOOD PRESSURE: 130 MMHG | BODY MASS INDEX: 22 KG/M2 | WEIGHT: 148 LBS | DIASTOLIC BLOOD PRESSURE: 72 MMHG | TEMPERATURE: 97 F | OXYGEN SATURATION: 96 %

## 2025-04-11 DIAGNOSIS — Z95.5 PRESENCE OF DRUG COATED STENT IN LAD CORONARY ARTERY: ICD-10-CM

## 2025-04-11 DIAGNOSIS — M25.531 ARTHRALGIA OF RIGHT WRIST: ICD-10-CM

## 2025-04-11 DIAGNOSIS — R53.82 CHRONIC FATIGUE: ICD-10-CM

## 2025-04-11 DIAGNOSIS — Z00.00 ENCOUNTER FOR ANNUAL HEALTH EXAMINATION: Primary | ICD-10-CM

## 2025-04-11 DIAGNOSIS — Z95.5 HISTORY OF PLACEMENT OF STENT IN ANTERIOR DESCENDING BRANCH OF LEFT CORONARY ARTERY: ICD-10-CM

## 2025-04-11 DIAGNOSIS — N20.1 URETEROLITHIASIS: ICD-10-CM

## 2025-04-11 DIAGNOSIS — R29.898 WEAKNESS OF BOTH LEGS: ICD-10-CM

## 2025-04-11 DIAGNOSIS — Z12.11 SCREENING FOR MALIGNANT NEOPLASM OF COLON: ICD-10-CM

## 2025-04-11 DIAGNOSIS — M79.641 PAIN IN BOTH HANDS: ICD-10-CM

## 2025-04-11 DIAGNOSIS — G47.33 OSA (OBSTRUCTIVE SLEEP APNEA): ICD-10-CM

## 2025-04-11 DIAGNOSIS — M79.642 PAIN IN BOTH HANDS: ICD-10-CM

## 2025-04-11 DIAGNOSIS — I10 ESSENTIAL HYPERTENSION: ICD-10-CM

## 2025-04-11 DIAGNOSIS — R41.89 COGNITIVE DECLINE: ICD-10-CM

## 2025-04-11 DIAGNOSIS — M25.551 PAIN OF RIGHT HIP: ICD-10-CM

## 2025-04-11 DIAGNOSIS — I25.10 ATHEROSCLEROSIS OF NATIVE CORONARY ARTERY OF NATIVE HEART WITHOUT ANGINA PECTORIS: ICD-10-CM

## 2025-04-11 DIAGNOSIS — Z12.5 SCREENING FOR MALIGNANT NEOPLASM OF PROSTATE: ICD-10-CM

## 2025-04-11 PROCEDURE — G0439 PPPS, SUBSEQ VISIT: HCPCS | Performed by: FAMILY MEDICINE

## 2025-04-11 PROCEDURE — 99499 UNLISTED E&M SERVICE: CPT | Performed by: FAMILY MEDICINE

## 2025-04-11 NOTE — PROGRESS NOTES
HPI:   Adam Villasenor is a 70 year old male who presents for a Medicare Initial Preventative Physical Exam (Welcome to Medicare- < 12 months on Medicare).    Right hip still bothers him. Will be seeing ortho. Has had some injections in the past.     Wife is concerned about his cognitive ability over the past couple of years.   She and he are still concerned. From phone note:    Patient wife wanted to let Dr Walls know some things prior to appt. States she has tried to discuss with him but becomes defensive.   Memory issues, change in personality. Cognitive issues.   Concerned with driving. Has almost been in accident a few times.   Pain allover.Could this be statin related? Had same issues with statin in past. - he tried stopping it on his own, but cardiologist did not like that. He was off of it for 3 months and did not feel better.   Not very active due to pain. Sits in chair and watches tv or [employer] videos    Knees act up.   Hips hurt, injections have helped sometimes in the past.   Gets weakness in spurts in legs.     Hand pain. Hurts to push up on it. I ordered OT last year and he never did it. 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 Seeing audiology.   Fatty liver status - last labs were good. Last US was 2017.     Cardio: following with Dr. Benavides. No cardiac symptoms.     Sleep study showed FRANKLIN, cpap mask fitted at that time. On 9 cm H20. Doing well with cpap.       He has been screened for Falls and is low risk.    He had a completely normal cognitive assessment - see flowsheet entries    Adam Villasenor has some abnormal functions as listed below:  He has Walking problems based on screening of functional status. He has problems with Daily Activities based on screening of functional status. He has problems with Memory based on screening of functional status.       Depression  Screening (PHQ-2/PHQ-9): Over the LAST 2 WEEKS   Little interest or pleasure in doing things: Not at all  Feeling down, depressed, or hopeless: Not at all  PHQ-2 SCORE: 0      Advanced Directive:  He does NOT have a Living Will on file in [facility].   Advance care planning including the explanation and discussion of advance directives standard forms performed Face to Face with patient and Family/surrogate (if present), and forms available to patient in AVS     He does NOT have a Power of  for Health Care on file in [facility].   Advance care planning including the explanation and discussion of advance directives standard forms performed Face to Face with patient and Family/surrogate (if present), and forms available to patient in AVS       He has never smoked tobacco.    He has been screened for alcohol abuse and his score is not 0:  Cut: Have you ever felt you should Cut down on your drinking?: (Patient-Rptd) Yes  Annoyed: Have people Annoyed you by criticizing your drinking?: (Patient-Rptd) No  Guilty: Have you ever felt bad or Guilty about your drinking?: (Patient-Rptd) No  Eye Opener: Have you ever had a drink first thing in the morning to steady your nerves or to get rid of a hangover (Eye opener)?: (Patient-Rptd) No  Total Score: (Patient-Rptd) 1        Patient Care Team: Patient Care Team:  Lucia Walls DO as PCP - General (Family Medicine)  Parish Solorzano MD as Consulting Physician (NEUROLOGY)  Camilo Benavides MD as Consulting Physician (CARDIOLOGY)  Hakan Lui MD as Consulting Physician (GASTROENTEROLOGY)  Amanda Castellanos PA as Consulting Physician (Physician Assistant)  Adrian Haque, PT  She Leonard, Watson Graves PT as Physical Therapist (Physical Therapy)    Patient Active Problem List   Diagnosis    History of placement of stent in anterior descending branch of left coronary artery    Presence of drug coated stent in LAD coronary artery    Atherosclerotic heart disease of  native coronary artery without angina pectoris    Essential hypertension    FRANKLIN (obstructive sleep apnea)    Hip pain    Arthralgia of right wrist    Chronic fatigue    Ureterolithiasis     Wt Readings from Last 3 Encounters:   04/11/25 148 lb (67.1 kg)   12/28/24 185 lb (83.9 kg)   11/22/24 185 lb (83.9 kg)      Last Cholesterol Labs:   Lab Results   Component Value Date    CHOLEST 130 03/12/2025    HDL 37 (L) 03/12/2025    LDL 72 03/12/2025    TRIG 115 03/12/2025          Last Chemistry Labs:   Lab Results   Component Value Date    AST 19 03/12/2025    ALT 26 03/12/2025    CA 9.7 03/12/2025    ALB 4.9 (H) 03/12/2025    TSH 1.250 04/01/2024    CREATSERUM 1.06 03/12/2025    GLU 90 03/12/2025        CBC  (most recent labs)   Lab Results   Component Value Date    WBC 9.0 03/12/2025    HGB 15.3 03/12/2025    .0 03/12/2025        ALLERGIES:   He has no known allergies.    CURRENT MEDICATIONS:   Outpatient Medications Marked as Taking for the 4/11/25 encounter (Office Visit) with Lucia Walls DO   Medication Sig    rosuvastatin 10 MG Oral Tab Take 1 tablet (10 mg total) by mouth daily.    METOPROLOL SUCCINATE ER 25 MG Oral Tablet 24 Hr Take 1 tablet (25 mg total) by mouth daily.      MEDICAL INFORMATION:   He  has a past medical history of Atherosclerosis of coronary artery (11/16/2014), Coronary atherosclerosis, Deafness in left ear, Disorder of liver, Essential hypertension, Hearing impairment, High blood pressure, High cholesterol, Hyperlipidemia, Memory problem (12/07/2015), Sleep apnea, and Visual impairment.    He  has a past surgical history that includes other; colonoscopy (10/03/2016); colonoscopy (N/A, 10/03/2016); and cath drug eluting stent.    His family history includes Cancer (age of onset: 82) in his father; Other in his paternal grandmother; Psychiatric in his son.   SOCIAL HISTORY:   He  reports that he has never smoked. He has never been exposed to tobacco smoke. He has never used smokeless  tobacco. He reports current alcohol use of about 2.0 standard drinks of alcohol per week. He reports current drug use. Drug: Cannabis.     REVIEW OF SYSTEMS:   GENERAL: feels well otherwise  SKIN: denies any unusual skin lesions  EYES: denies blurred vision or double vision  HEENT: denies nasal congestion, sinus pain or ST  LUNGS: denies shortness of breath with exertion  CARDIOVASCULAR: denies chest pain on exertion  GI: denies abdominal pain, denies heartburn  : 0 per night nocturia, no complaint of urinary incontinence  MUSCULOSKELETAL: as above   NEURO: denies headaches  PSYCHE: denies depression or anxiety, see HPI   HEMATOLOGIC: denies hx of anemia  ENDOCRINE: denies thyroid history  ALL/ASTHMA: denies hx of allergy or asthma    EXAM:   /72   Pulse 99   Temp 97 °F (36.1 °C) (Temporal)   Resp 20   Wt 148 lb (67.1 kg)   SpO2 96%   BMI 21.86 kg/m²   Estimated body mass index is 21.86 kg/m² as calculated from the following:    Height as of 12/28/24: 5' 9\" (1.753 m).    Weight as of this encounter: 148 lb (67.1 kg).    Medicare Hearing Assessment  (Required for AWV/SWV)    Hearing Screening    Time taken: 4/11/2025 11:18 AM  Screening Method: Finger Rub  Finger Rub Result: Pass                 General Appearance:  Alert, cooperative, no distress, appears stated age   Head:  Normocephalic, without obvious abnormality, atraumatic   Eyes:  PERRL, conjunctiva/corneas clear, EOM's intact, both eyes   Ears:  Normal TM's and external ear canals, both ears   Nose: Nares normal, septum midline, mucosa normal, no drainage or sinus tenderness   Throat: Lips, mucosa, and tongue normal; teeth and gums normal   Neck: Supple, symmetrical, trachea midline, no adenopathy, thyroid: not enlarged, symmetric, no tenderness/mass/nodules,     Back:   Symmetric, no curvature, ROM normal, no CVA tenderness   Lungs:   Clear to auscultation bilaterally, respirations unlabored   Chest Wall:  No tenderness or deformity   Heart:   Regular rate and rhythm, S1, S2 normal, no murmur, rub or gallop   Abdomen:   Soft, non-tender, bowel sounds active all four quadrants,  no masses, no organomegaly   Extremities: Extremities normal, atraumatic, no cyanosis or edema   Pulses: 2+ and symmetric   Skin: Skin color, texture, turgor normal, no rashes or lesions    Lymph nodes: Cervical, supraclavicular, and axillary nodes normal   Neurologic: Normal          Vaccination History     Immunization History   Administered Date(s) Administered    Covid-19 Vaccine Pfizer 30 mcg/0.3 ml 02/24/2021, 03/16/2021, 10/01/2021    Covid-19 Vaccine Pfizer Bivalent 30mcg/0.3mL 10/08/2022    Covid-19 Vaccine Pfizer Jemal-Sucrose 30 mcg/0.3 ml 06/13/2022    FLU VAC High Dose 65 YRS & Older PRSV Free (36623) 11/01/2023    FLU VAC QIV SPLIT 3 YRS AND OLDER (74798) 09/11/2016    Fluarix 6 Months And Older 0.5 ml prefilled syringe (11666) 09/22/2018    Flublok Quad Influenza Vaccine (86485) 12/20/2019    Fluvirin, 3 Years & >, Im 10/21/2014, 09/16/2015    HEP B, Adult 02/18/2020    Hep A, Adult 02/18/2020    Hepatitis A 06/21/2016    High Dose Fluzone Influenza Vaccine, 65yr+ PF 0.5mL (58771) 10/01/2021, 10/08/2022    IPV 06/21/2016    Influenza 11/11/2013, 09/27/2017    Lyme Disease 03/08/2000, 05/11/2000, 04/24/2001    MMR 02/18/2020    Pfizer Covid-19 Vaccine 30mcg/0.3ml 12yrs+ 11/01/2023    Pneumococcal Conjugate PCV20 04/01/2024    Pneumovax 23 04/19/2021    RSV, recombinant, RSVpreF, adjuvanted (Arexvy) 11/01/2023    TD 06/25/1997    TDAP 10/07/2008, 04/05/2010, 02/18/2020    Typhoid, Oral 06/21/2016    Zoster Vaccine Recombinant Adjuvanted (Shingrix) 09/18/2020        ASSESSMENT AND OTHER RELEVANT CHRONIC CONDITIONS:   Adam Villasenor is a 70 year old male who presents for a Medicare Assessment.     PLAN SUMMARY:   Diagnoses and all orders for this visit:    1. Encounter for annual health examination  Completed today.     2. Screening for malignant neoplasm of  prostate  - PSA Total, Screen; Future    3. Cognitive decline  Will check MRI brain, which hasn't been done in 10 yrs.   - MRI BRAIN (CPT=70551); Future    4. Weakness of both legs  Check MRI brain, follow up with neuro. This is subjective then feel weak at times.   - MRI BRAIN (CPT=70551); Future    5. Pain in both hands  Can see if PT/OT will help some. He had carpal tunnel done last year.   - Occupational Therapy Referral - [facility]    6. Screening for malignant neoplasm of colon  Due for colonoscopy. [facility].   - Gastro Referral - In Network    7. Arthralgia of right wrist  As above.     8. Atherosclerosis of native coronary artery of native heart without angina pectoris  No cardiac symptoms. Following with Dr. Benavides.     9. Chronic fatigue  Stable. Not has bad as in the past.     10. Essential hypertension  Controlled with current meds.     11. Pain of right hip  Follow up with ortho for eval if getting worse.     12. History of placement of stent in anterior descending branch of left coronary artery  Following with cardiology.     13. FRANKLIN (obstructive sleep apnea)  On cpap.     14. Presence of drug coated stent in LAD coronary artery  Noted.     15. Ureterolithiasis  Treated, resolved. Follow up with urology if recurs or has more urine symptoms.     Diet assessment: good     PLAN:  The patient indicates understanding of these issues and agrees to the plan.  Lab work ordered.  Reinforced healthy diet, lifestyle, and exercise.    No follow-ups on file.     Lcuia Walls DO, 4/19/2021     General Health     In the past six months, have you lost more than 10 pounds without trying?: (Patient-Rptd) 2 - No  Has your appetite been poor?: (Patient-Rptd) No  How does the patient maintain a good energy level?: (Patient-Rptd) Daily Walks  How would you describe your daily physical activity?: (Patient-Rptd) Light  How would you describe your current health state?: (Patient-Rptd) Fair  How do you  maintain positive mental well-being?: (Patient-Rptd) Social Interaction, Puzzles    This section provided for quick review of chart, separate sheet to patient  PREVENTATIVE SERVICES  INDICATIONS AND SCHEDULE Internal Lab or Procedure External Lab or Procedure   Diabetes Screening      HbgA1C   Annually HgbA1C (%)   Date Value   11/19/2015 5.6            No data to display                Fasting Blood Sugar (FSB)Annually Glucose (mg/dL)   Date Value   03/12/2025 90       Cardiovascular Disease Screening     LDL Annually LDL Cholesterol (mg/dL)   Date Value   03/12/2025 72        EKG - w/ Initial Preventative Physical Exam only, or if medically necessary Electrocardiogram date    Colorectal Cancer Screening      Colonoscopy Screen every 10 years Health Maintenance   Topic Date Due    Colorectal Cancer Screening  12/03/2024    Update Health Maintenance if applicable    Flex Sigmoidoscopy Screen every 10 years No results found for this or any previous visit.      No data to display                 Fecal Occult Blood Annually Occult Blood Result (no units)   Date Value   07/27/2016 Positive for Occult Blood (A)         No data to display                Glaucoma Screening      Ophthalmology Visit Annually: Diabetics, FHx Glaucoma, AA>50, > 65      No data to display                Prostate Cancer Screening      PSA  Annually Health Maintenance   Topic Date Due    PSA  04/01/2026     Update Health Maintenance if applicable     Immunizations (Update Immunization Activity if applicable)     Influenza  Covered Annually 11/1/2023   Please get every year    Pneumococcal 13 (Prevnar)  Covered Once after 65 No vaccine history found Please get once after your 65th birthday    Pneumococcal 23 (Pneumovax)  Covered Once after 65 No vaccine history found Please get once after your 65th birthday    Hepatitis B for Moderate/High Risk 02/18/2020 Medium/high risk factors:   End-stage renal disease   Hemophiliacs who received  Factor VIII or IX concentrates   Clients of institutions for the mentally retarded   Persons who live in the same house as a HepB virus carrier   Homosexual men   Illicit injectable drug abusers     Tetanus Toxoid  Only covered with a cut with metal- TD and TDaP Not covered by Medicare Part B) 06/25/1997 This may be covered with your prescription benefits, but Medicare does not cover unless Medically needed    Zoster   Not covered by Medicare Part B No vaccine history found This may be covered with your pharmacy  prescription benefits

## 2025-04-18 ENCOUNTER — ORDER TRANSCRIPTION (OUTPATIENT)
Dept: PHYSICAL THERAPY | Age: 70
End: 2025-04-18

## 2025-04-18 DIAGNOSIS — M79.641 PAIN IN BOTH HANDS: Primary | ICD-10-CM

## 2025-04-18 DIAGNOSIS — M79.642 PAIN IN BOTH HANDS: Primary | ICD-10-CM

## 2025-05-02 ENCOUNTER — HOSPITAL ENCOUNTER (OUTPATIENT)
Dept: MRI IMAGING | Age: 70
Discharge: HOME | End: 2025-05-02
Attending: FAMILY MEDICINE
Payer: MEDICARE

## 2025-05-02 ENCOUNTER — TELEPHONE (OUTPATIENT)
Dept: FAMILY MEDICINE CLINIC | Facility: CLINIC | Age: 70
End: 2025-05-02

## 2025-05-02 DIAGNOSIS — R41.89 COGNITIVE DECLINE: ICD-10-CM

## 2025-05-02 DIAGNOSIS — R29.898 WEAKNESS OF BOTH LEGS: ICD-10-CM

## 2025-05-02 DIAGNOSIS — R90.89 ABNORMAL FINDING ON MRI OF BRAIN: Primary | ICD-10-CM

## 2025-05-02 PROCEDURE — 70551 MRI BRAIN STEM W/O DYE: CPT | Performed by: FAMILY MEDICINE

## 2025-05-02 NOTE — TELEPHONE ENCOUNTER
MRI brain done today. Showed a new area of flare in left parietal area. Could not further characterize, but possible old infarct vs neoplasm or other.   MRI w and wo contrast recommended.     Spoke with pt and advised of above. He is already scheduled with [facility], but not until end of June, about 2 months away.   MRI w and wo contrast ordered.   He can call to scheduled.   If it looks more like a mass, would refer to neurosurgery.     He will d/w wife and likely have more questions next week.   I told him I am happy to discuss or they can set up an appointment as well.

## 2025-05-05 ENCOUNTER — TELEPHONE (OUTPATIENT)
Dept: FAMILY MEDICINE CLINIC | Facility: CLINIC | Age: 70
End: 2025-05-05

## 2025-05-05 NOTE — TELEPHONE ENCOUNTER
CC:  Jovi Lo is here today for lightheaded, dizzy was started on new medication recently not sure if that is the cause, the symptoms started last night .    Medications: medications verified and updated  Refills needed today?  NO  Denies known Latex allergy or symptoms of Latex sensitivity.  Patient would like communication of their results via:      Cell Phone:   Telephone Information:   Mobile [phone]     Okay to leave a message containing results? Yes  Tobacco history: verified    There are no preventive care reminders to display for this patient.  Patient is up to date, no discussion needed.    [facility] status addressed. Patient Active.  No known drug allergies.              Patient has further questions about his recent MRI scan    He would like to speak to pcp with spouse on the line    Patient declined appt offer    Please adv  Thank you

## 2025-05-06 ENCOUNTER — OFFICE VISIT (OUTPATIENT)
Dept: OCCUPATIONAL MEDICINE | Age: 70
End: 2025-05-06
Attending: FAMILY MEDICINE
Payer: MEDICARE

## 2025-05-06 DIAGNOSIS — M79.642 PAIN IN BOTH HANDS: Primary | ICD-10-CM

## 2025-05-06 DIAGNOSIS — M79.641 PAIN IN BOTH HANDS: Primary | ICD-10-CM

## 2025-05-06 PROCEDURE — 97110 THERAPEUTIC EXERCISES: CPT

## 2025-05-06 PROCEDURE — 97165 OT EVAL LOW COMPLEX 30 MIN: CPT

## 2025-05-06 NOTE — PROGRESS NOTES
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 Pt also notes decreased  strength and decreased ability to make a full fist in the R. Pt notes symptoms have been present for a couple years. OT was ordered last year, however pt did not attend.  Pt has attempted to use natural rubs   Pain level: current 5 /10 (CMC pain), at best 5 /10, at worst 7 /10  Description of symptoms: Stiffness of the digits, numbness D3-4, pain, weakness   Occupation: part time job in an [employer] store   Occupational Roles: worker; cook   Leisure activities/Hobbies: fly fishing   Prior level of function: Independent with pain  Current limitations: washing a heavy frying pan, picking up small objects, pulling archery trigger  Pt goals: Increase strength, decrease pain, decrease stiffness  Hand Dominance: right  Living Situation: family    Past medical history was reviewed with Adam.  Significant findings include: carpal tunnel release R Dec 2023  Imaging/Tests: none   Adam  has a past medical history of Atherosclerosis of coronary artery (11/16/2014), Coronary atherosclerosis, Deafness in left ear, Disorder of liver, Essential hypertension, Hearing impairment, High blood pressure, High cholesterol, Hyperlipidemia, Memory problem (12/07/2015), Sleep apnea, and Visual impairment.  He  has a past surgical history that includes other; colonoscopy (10/03/2016); colonoscopy (N/A, 10/03/2016); and cath drug eluting stent.    ASSESSMENT  Adam presents to occupational therapy evaluation with primary c/o Hand pain, weakness, stiffness,  numbness. The results of the objective tests and measures show Decreased digit/wrist ROM, decreased  strength. Functional deficits include but are not limited to washing a heavy frying pan, picking up small objects, pulling archery trigger. Signs and symptoms are consistent with diagnosis of Bilateral hand pain. Pt and OT discussed evaluation findings, pathology, POC and HEP.  Pt voiced understanding and performs HEP correctly without reported pain. Skilled Occupational Therapy is medically necessary to address the above impairments and reach functional goals.  OBJECTIVE:    Musculoskeletal  Observation: Unremarkable Mild edema, arthritic changes at several digits       Orthotics: none         Special Tests:  Grind test to CMC: mild (+) crepitus     ROM and Strength  (* denotes performed with pain)  Wrist   ROM    R L     Flex (C7) 60 76     Ext (C6) 30 45     Ulnar Dev 17 20     Radial Dev 15 15        R Hand ROM   IF MF RF SF     MP 80 90 90 85     PIP 85 85 80 80     DIP 55 65 55 60     WEBB 220 240 225 225      Thumb ROM   MP Flex IP Flex Radial Abd Palmar Abd Opposition     Right 40 50 55 35 4 (fingertips 2-3 only per Kapadji scale)     Left 55 55 60 45 7      Hand Strength (lbs) R L      37, 41, 39 av.7 56, 47, 54 av.3     3 pt Pinch 7 10     Lateral pinch 11 14       Edema:  Circum Edema (cm) Wrist Crease MCP     Right 18.2 cm  21.4 cm      Left 18.4 cm  20.4 cm        Neurological:  Sensory: tingling       Light Touch Ten Test: D4 7/10, D3 8/10    ADLs/IADLs:  ADL's    Bathing: Ind     Dressing: Ind     Feeding: min difficulty     Grooming: min difficulty  IADL's     Homemaking: mod difficulty     Food Prep: mod difficulty     Driving: ind   Other Functional Mobility/ADL Comments: ind      Today's Treatment and Response:   Pt education was provided on exam findings, treatment diagnosis, treatment plan, expectations, and prognosis.  Today's Treatment       2025   OT Treatment   Therapeutic  Exercise Return demo Exercises  - Tendon Glides  - 3 x daily - 7 x weekly - 10 reps  - Thumb Opposition  - 3 x daily - 7 x weekly - 10 reps  - Wrist AROM Flexion Extension  - 3 x daily - 7 x weekly - 10 reps  - Thumb Abduction AROM on Table  - 3 x daily - 7 x weekly - 10 reps   Therapeutic Exercise Min 10   Eval Min 35   Total Timed Procedures 10   Total Service Procedures 45   Total Time 45         Patient was instructed in and issued a HEP for: Access Code: [email or web address]  URL: [email or web address]  Date: 05/06/2025  Prepared by: Almita Peralta    Exercises  - Tendon Glides  - 3 x daily - 7 x weekly - 10 reps  - Thumb Opposition  - 3 x daily - 7 x weekly - 10 reps  - Wrist AROM Flexion Extension  - 3 x daily - 7 x weekly - 10 reps  - Thumb Abduction AROM on Table  - 3 x daily - 7 x weekly - 10 reps     Charges:  OT EVAL Low Complexity, 1 TE   Based on analysis of data from a problem-focused assessment from a brief chart review, clinical presentation of physical, cognitive and psychosocial skills, as well as review of patient rated outcome measures, this evaluation involved Low complexity decision making, with 1-3 occupational performance component deficits, no comorbidities, and no need for modification of tasks or assistance with assessment.                                                                                    PLAN OF CARE:    Goals: (to be met in 8 visits)    Not Met Progress Toward Partially Met Met   Patient will report no more than 1-2/10 pain in R hand during self-care activities. [] [] [] []   Patient will present with  strength in the R hand to that of 90% of the contralateral hand in order to be able to perform opening a jar. [] [] [] []   Patient will present with increase in R digit 2-5 WEBB to 240 to allow for ease with gripping toothbrush. [] [] [] []   Pt will demonstrate independence and compliance with HEP to maximize gains made in occupational therapy and progress  toward functional independence.    [] [] [] []   Patient will demonstrate an improvement in the self-rated DASH score to 20% or better to reflect functional gains reported, in the achievement of use of bow and self care daily skills.  [] [] [] []   Pt will easily manipulate small objects in hand such as coins, keys or medications.  [] [] [] []    [] [] [] []    [] [] [] []         Frequency / Duration: Patient will be seen 2x/week or a total of 8 visits over a 90 day period. Treatment will include: Manual Therapy; Neuromuscular Re-education; Self-Care Home Management; Home Exercise Program instruction; Therapeutic Exercise; Therapeutic Activities; Other (use comment) (WP, splinting PRN)    Education or treatment limitation: None   Rehab Potential: good     QuickDASH Outcome Score  Score: (Patient-Rptd) 52.27 % (5/1/2025 11:30 PM)      Patient/Family/Caregiver was advised of these findings, precautions, and treatment options and has agreed to actively participate in planning and for this course of care.    Thank you for your referral. Please co-sign or sign and return this letter via fax as soon as possible to [phone]. If you have any questions, please contact me at Dept: [phone]    Sincerely,  Electronically signed by therapist: Almita Peralta, OT  Physician's certification required: Yes  I certify the need for these services furnished under this plan of treatment and while under my care.    X___________________________________________________ Date____________________    Certification From: 5/6/2025  To:8/4/2025

## 2025-05-08 ENCOUNTER — OFFICE VISIT (OUTPATIENT)
Dept: OCCUPATIONAL MEDICINE | Age: 70
End: 2025-05-08
Attending: FAMILY MEDICINE
Payer: MEDICARE

## 2025-05-08 PROCEDURE — 97140 MANUAL THERAPY 1/> REGIONS: CPT

## 2025-05-08 PROCEDURE — 97035 APP MDLTY 1+ULTRASOUND EA 15: CPT

## 2025-05-08 PROCEDURE — 97110 THERAPEUTIC EXERCISES: CPT

## 2025-05-08 NOTE — PROGRESS NOTES
Patient: Adam Villasenor (70 year old, male) Referring Provider:  Insurance:   Diagnosis: Bilateral hand pain Lucia Walls  MEDICARE   Date of Surgery: NA Next MD visit:  [facility]   Precautions:  None TBD Referral Information:   Date of Injury: Chronic Date of Evaluation: Req: 0, Auth: 0, Exp:     05/06/25 POC Auth Visits:          Today's Date   5/8/2025    Subjective  \"There's pain in the thumb joint\"       Pain: 4/10     Objective  Mild crepitus with CMC motion to R             Assessment  Initiated gentle /pinch with putty this visit to address R  strength.  Tolerated well in clinic and added to HEP. Pt noting primarily thumb pain this visit at the CMC joint.    Goals (to be met in 8 visits)      Not Met Progress Toward Partially Met Met   Patient will report no more than 1-2/10 pain in R hand during self-care activities. [] [] [] []   Patient will present with  strength in the R hand to that of 90% of the contralateral hand in order to be able to perform opening a jar. [] [] [] []   Patient will present with increase in R digit 2-5 WEBB to 240 to allow for ease with gripping toothbrush. [] [] [] []   Pt will demonstrate independence and compliance with HEP to maximize gains made in occupational therapy and progress toward functional independence.    [] [] [] []   Patient will demonstrate an improvement in the self-rated DASH score to 20% or better to reflect functional gains reported, in the achievement of use of bow and self care daily skills.  [] [] [] []   Pt will easily manipulate small objects in hand such as coins, keys or medications.  [] [] [] []    [] [] [] []    [] [] [] []             Plan  Continue OT for pain management, strengthening.  1-2x/wk x 8 visits    Treatment Last 4 Visits        5/6/2025 5/8/2025   OT Treatment   Treatment Day  2   Therapeutic Exercise Return demo Exercises  - Tendon Glides  - 3 x daily - 7 x weekly - 10 reps  - Thumb Opposition  - 3 x daily - 7 x weekly -  10 reps  - Wrist AROM Flexion Extension  - 3 x daily - 7 x weekly - 10 reps  - Thumb Abduction AROM on Table  - 3 x daily - 7 x weekly - 10 reps yellow putty exercises  -/reposition  -roll/tip pinch  -lateral pinch  -tripod pinch/pull      Manual Therapy  STM through thenar eminence, intrinsic mob    End range wrist flexion/extension  End range thumb motion   Modalities  US .8 w/cm2, 3 mhz, cont to thenar eminence, 8 min   Therapeutic Exercise Min 10 20   Manual Therapy Min  12   Ultrasound Min  8   Eval Min 35    Total Timed Procedures 10 40   Total Service Procedures 45 40   Total Time 45 40         HEP  yellow putty exercises  -/reposition  -roll/tip pinch  -lateral pinch  -tripod pinch/pull    Charges     1 US 1 MT, 1 TE, putty    Almita Peralta, OTR/L

## 2025-05-09 RX ORDER — METOPROLOL SUCCINATE 25 MG/1
25 TABLET, EXTENDED RELEASE ORAL DAILY
Qty: 90 TABLET | Refills: 0 | Status: SHIPPED | OUTPATIENT
Start: 2025-05-09

## 2025-05-09 NOTE — TELEPHONE ENCOUNTER
Requested Renewals     Name from pharmacy: Metoprolol Succinate Er 24hr 25 Mg Tab Nort         Will file in chart as: METOPROLOL SUCCINATE ER 25 MG Oral Tablet 24 Hr    Sig: Take 1 tablet (25 mg total) by mouth daily.    Disp: 90 tablet    Refills: 0    Start: 5/9/2025    Class: Normal    Non-formulary    Last ordered: 10 months ago (6/17/2024) by Lucia Walls DO    Last refill: 7/1/2024    Rx #: [id number]    Hypertension Medications Protocol Xradki2405/09/2025 11:50 AM   Protocol Details CMP or BMP in past 12 months    Last BP reading less than 140/90    In person appointment or virtual visit in the past 12 mos or appointment in next 3 mos    EGFRCR or GFRNAA > 50    Medication is active on med list      To be filled at: [id number] - [place], [place] - [address] [phone], [phone]

## 2025-05-20 ENCOUNTER — OFFICE VISIT (OUTPATIENT)
Dept: OCCUPATIONAL MEDICINE | Age: 70
End: 2025-05-20
Attending: FAMILY MEDICINE
Payer: MEDICARE

## 2025-05-20 PROCEDURE — 97110 THERAPEUTIC EXERCISES: CPT

## 2025-05-20 PROCEDURE — 97140 MANUAL THERAPY 1/> REGIONS: CPT

## 2025-05-20 NOTE — PROGRESS NOTES
Patient: Adam Villasenor (70 year old, male) Referring Provider:  Insurance:   Diagnosis: Bilateral hand pain Lucia Walls  MEDICARE   Date of Surgery: NA Next MD visit:  [facility]   Precautions:  None TBD Referral Information:   Date of Injury: Chronic Date of Evaluation: Req: 0, Auth: 0, Exp:     05/06/25 POC Auth Visits:          Today's Date   5/20/2025    Subjective  \"That thumb does not want to get the small finger.\"       Pain: 1/10     Objective       Rt/Lt Thumb       5/6/2025 5/20/2025   Thumb ROM Rt/Lt   Thumb MP Flex Rt 40 50   Thumb IP Flex Rt 50 50   Thumb Radial ABD Rt 55    Thumb Palmar ABD Rt 35    Thumb Opposition Rt 4       fingertips 2-3 only per Kapadji scale    Thumb MP Flex Lt 55    Thumb IP Flex Lt 55    Thumb Radial ABD Lt 60    Thumb Palmar ABD Lt 45    Thumb Opposition Lt 7             Assessment  Continued exercises to address strength of thumb with rubber band. Exercises were tolerated well and added to HEP. Pt notes limited performance of current exercises due to vacation. Limitations persist in digit opposition and pt performed exercises to opponens muscles.    Goals (to be met in 8 visits)      Not Met Progress Toward Partially Met Met   Patient will report no more than 1-2/10 pain in R hand during self-care activities. [] [] [] []   Patient will present with  strength in the R hand to that of 90% of the contralateral hand in order to be able to perform opening a jar. [] [] [] []   Patient will present with increase in R digit 2-5 WEBB to 240 to allow for ease with gripping toothbrush. [] [] [] []   Pt will demonstrate independence and compliance with HEP to maximize gains made in occupational therapy and progress toward functional independence.    [] [] [] []   Patient will demonstrate an improvement in the self-rated DASH score to 20% or better to reflect functional gains reported, in the achievement of use of bow and self care daily skills.  [] [] [] []   Pt will easily  manipulate small objects in hand such as coins, keys or medications.  [] [] [] []    [] [] [] []    [] [] [] []                 Plan  Re-assess.    Treatment Last 4 Visits        5/6/2025 5/8/2025 5/20/2025   OT Treatment   Treatment Day  2 3   Therapeutic Exercise Return demo Exercises  - Tendon Glides  - 3 x daily - 7 x weekly - 10 reps  - Thumb Opposition  - 3 x daily - 7 x weekly - 10 reps  - Wrist AROM Flexion Extension  - 3 x daily - 7 x weekly - 10 reps  - Thumb Abduction AROM on Table  - 3 x daily - 7 x weekly - 10 reps yellow putty exercises  -/reposition  -roll/tip pinch  -lateral pinch  -tripod pinch/pull    Roll/pinch green/blue foam cubes    green clothespins to  and move foam cubes     Thumb drags to small finger to fingertip    Rubber band ex  Digit/thumb extension  Radial abduction  Flexion  Opposition  1DOI  Palmar abduction  Each x 20       Manual Therapy  STM through thenar eminence, intrinsic mob    End range wrist flexion/extension  End range thumb motion Chip clip to web space    IASTM with hook to thenar eminence    Gentle PROM to thumb to end range flexion    Place/hold into opposition   Modalities  US .8 w/cm2, 3 mhz, cont to thenar eminence, 8 min    Therapeutic Exercise Min 10 20 30   Manual Therapy Min  12 15   Ultrasound Min  8    Eval Min 35     Total Timed Procedures 10 40 45   Total Service Procedures 45 40 45   Total Time 45 40 45         HEP  Rubber band ex  Digit/thumb extension  Radial abduction  Flexion  Opposition  1DOI  Palmar abduction  Each x 20    Charges     2 TE,  1MT    Almita Peralta OTR/L

## 2025-05-21 ENCOUNTER — HOSPITAL ENCOUNTER (OUTPATIENT)
Dept: ULTRASOUND IMAGING | Age: 70
Discharge: HOME | End: 2025-05-21
Attending: UROLOGY
Payer: MEDICARE

## 2025-05-21 ENCOUNTER — PATIENT MESSAGE (OUTPATIENT)
Facility: LOCATION | Age: 70
End: 2025-05-21

## 2025-05-21 DIAGNOSIS — N20.0 KIDNEY STONE: ICD-10-CM

## 2025-05-21 PROCEDURE — 76770 US EXAM ABDO BACK WALL COMP: CPT | Performed by: UROLOGY

## 2025-05-21 NOTE — TELEPHONE ENCOUNTER
----- Message from Mateo SHEN sent at 5/21/2025 11:42 AM CDT -----  Scheduled PV and sent mcm to confirm.  ----- Message -----  From: Otis Babin MD  Sent: 5/21/2025  11:28 AM CDT  To: [facility] ;  Urology Cl#     Please schedule for in person or telehealth visit in the next month or 2.  Thank you.      ----- Message -----  From: [facility]  Sent: 5/21/2025  11:25 AM CDT  To: Otis Babin MD

## 2025-05-22 ENCOUNTER — APPOINTMENT (OUTPATIENT)
Dept: OCCUPATIONAL MEDICINE | Age: 70
End: 2025-05-22
Attending: FAMILY MEDICINE
Payer: MEDICARE

## 2025-05-28 ENCOUNTER — HOSPITAL ENCOUNTER (OUTPATIENT)
Dept: MRI IMAGING | Age: 70
Discharge: HOME | End: 2025-05-28
Attending: FAMILY MEDICINE
Payer: MEDICARE

## 2025-05-28 DIAGNOSIS — R90.89 ABNORMAL FINDING ON MRI OF BRAIN: ICD-10-CM

## 2025-05-28 DIAGNOSIS — R29.898 WEAKNESS OF BOTH LEGS: ICD-10-CM

## 2025-05-28 DIAGNOSIS — R41.89 COGNITIVE DECLINE: ICD-10-CM

## 2025-05-28 PROCEDURE — A9575 INJ GADOTERATE MEGLUMI 0.1ML: HCPCS | Performed by: FAMILY MEDICINE

## 2025-05-28 PROCEDURE — 70553 MRI BRAIN STEM W/O & W/DYE: CPT | Performed by: FAMILY MEDICINE

## 2025-05-28 RX ORDER — GADOTERATE MEGLUMINE 376.9 MG/ML
28 INJECTION INTRAVENOUS
Status: COMPLETED | OUTPATIENT
Start: 2025-05-28 | End: 2025-05-28

## 2025-05-28 RX ADMIN — GADOTERATE MEGLUMINE 28 ML: 376.9 INJECTION INTRAVENOUS at 17:25:00

## 2025-05-29 ENCOUNTER — TELEPHONE (OUTPATIENT)
Dept: FAMILY MEDICINE CLINIC | Facility: CLINIC | Age: 70
End: 2025-05-29

## 2025-05-29 ENCOUNTER — OFFICE VISIT (OUTPATIENT)
Dept: OCCUPATIONAL MEDICINE | Age: 70
End: 2025-05-29
Attending: FAMILY MEDICINE
Payer: MEDICARE

## 2025-05-29 PROCEDURE — 97110 THERAPEUTIC EXERCISES: CPT

## 2025-05-29 NOTE — PROGRESS NOTES
Patient: Adam Villasenor (70 year old, male) Referring Provider:  Insurance:   Diagnosis: Bilateral hand pain Lucia Walls  MEDICARE   Date of Surgery: NA Next MD visit:  [facility]   Precautions:  None TBD Referral Information:   Date of Injury: Chronic Date of Evaluation: Req: 0, Auth: 0, Exp:     25 POC Auth Visits:          Today's Date   2025    Subjective  \"I'm using a sleeve to the ring finger when I shoot my bow and that's helping.\"       Pain: 2/10     Objective       Wrist       2025   Wrist ROM/MMT   Rt Wrist Flex (C7) 60 68   Lt Wrist Flex (C7) 76    Rt Wrist ext (C6) 30 45   Lt Wrist ext (C6) 45    Rt Wrist Ulnar Deviation 17    Lt Wrist Ulnar Deviation 20    Rt Wrist Radial Deviation 15    Lt Wrist Radial Deviation 15     Rt Hand       2025   R Hand ROM/MMT   MP Index Rt 80    PIP Index RT 85    DIP Index RT 55    WEBB Index     MP Middle RT 90    PIP Middle RT 85    DIP Middle RT 65    WEBB Middle     MP Ring RT 90 90   PIP Ring RT 80 80   DIP Ring RT 55 60   WEBB Ring  230   MP Small RT 85 90   PIP Small RT 80 80   DIP Small RT 60 70   WEBB Small  240    Rt/Lt Thumb       2025   Thumb ROM Rt/Lt   Thumb MP Flex Rt 40 50 55   Thumb IP Flex Rt 50 50 55   Thumb Radial ABD Rt 55  60   Thumb Palmar ABD Rt 35  41   Thumb Opposition Rt 4       fingertips 2-3 only per Kapadji scale  7       able to reach PIP, however unble to reach tip of small finger   Thumb MP Flex Lt 55     Thumb IP Flex Lt 55     Thumb Radial ABD Lt 60     Thumb Palmar ABD Lt 45     Thumb Opposition Lt 7  8    Hand Strength       2025   Hand Strength    Rt 37, 41, 39 av.7 39    Lt 56, 47, 54 av.3 49   3 Pt Pinch Rt 7 10   3 Pt Pinch Lt 10 12   Lateral Pinch Rt 11 13   Lateral Pinch Lt 14 14            Assessment   strength remains weak overall. Pt has not consistently been performing  exercises and encouraged to do  so.  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    Goals (to be met in 8 visits)      Not Met Progress Toward Partially Met Met   Patient will report no more than 1-2/10 pain in R hand during self-care activities. [] [] [x] []   Patient will present with  strength in the R hand to that of 90% of the contralateral hand in order to be able to perform opening a jar. [] [] [] []   Patient will present with increase in R digit 2-5 WEBB to 240 to allow for ease with gripping toothbrush. [] [] [x] []   Pt will demonstrate independence and compliance with HEP to maximize gains made in occupational therapy and progress toward functional independence.    [] [] [x] []   Patient will demonstrate an improvement in the self-rated DASH score to 20% or better to reflect functional gains reported, in the achievement of use of bow and self care daily skills.  [] [] [] []   Pt will easily manipulate small objects in hand such as coins, keys or medications.  [] [] [] []    [] [] [] []    [] [] [] []                     Plan  Continue OT 1x/wk x 2-3 visits for continued strengthening.    Treatment Last 4 Visits        5/6/2025 5/8/2025 5/20/2025 5/29/2025   OT Treatment   Treatment Day  2 3 4   Therapeutic Exercise Return demo Exercises  - Tendon Glides  - 3 x daily - 7 x weekly - 10 reps  - Thumb Opposition  - 3 x daily - 7 x weekly - 10 reps  - Wrist AROM Flexion Extension  - 3 x daily - 7 x weekly - 10 reps  - Thumb Abduction AROM on Table  - 3 x daily - 7 x weekly - 10 reps yellow putty exercises  -/reposition  -roll/tip pinch  -lateral pinch  -tripod pinch/pull    Roll/pinch green/blue foam cubes    green clothespins to  and move foam cubes     Thumb drags to small finger to fingertip    Rubber band ex  Digit/thumb extension  Radial abduction  Flexion  Opposition  1DOI  Palmar abduction  Each x 20     In hand manipulation placing large pegs (x4), gripper black  level 2 to remove    beige Flex bar  -wrist flexion  -wrist extension  -supination  -pronation  Each x 20    Digiflex green full  x 30  Red each digit x 15  Tripod pinch x 20  Green lateral pinch x 20    Opposition exercises with thumbercizer      Manual Therapy  STM through thenar eminence, intrinsic mob    End range wrist flexion/extension  End range thumb motion Chip clip to web space    IASTM with hook to thenar eminence    Gentle PROM to thumb to end range flexion    Place/hold into opposition    Modalities  US .8 w/cm2, 3 mhz, cont to thenar eminence, 8 min     Therapeutic Exercise Min 10 20 30 40   Manual Therapy Min  12 15    Ultrasound Min  8     Eval Min 35      Total Timed Procedures 10 40 45 40   Total Service Procedures 45 40 45 40   Total Time 45 40 45 40         HEP  Reviewed gripping/putty     Charges     3 TE    Almita Peralta, OTR/L

## 2025-05-29 NOTE — TELEPHONE ENCOUNTER
Lab Frequency Next Occurrence   PSA Total, Screen Once 04/11/2025     Letter mailed to patient reminding them they have outstanding orders.

## 2025-06-02 PROBLEM — Z80.0 FAMILY HISTORY OF COLON CANCER: Status: ACTIVE | Noted: 2025-06-02

## 2025-06-02 PROBLEM — Z86.0101 PERSONAL HISTORY OF ADENOMATOUS AND SERRATED COLON POLYPS: Status: ACTIVE | Noted: 2025-06-02

## 2025-06-13 ENCOUNTER — OFFICE VISIT (OUTPATIENT)
Dept: OCCUPATIONAL MEDICINE | Age: 70
End: 2025-06-13
Attending: FAMILY MEDICINE
Payer: MEDICARE

## 2025-06-13 PROCEDURE — 97530 THERAPEUTIC ACTIVITIES: CPT

## 2025-06-13 PROCEDURE — 97140 MANUAL THERAPY 1/> REGIONS: CPT

## 2025-06-13 PROCEDURE — 97760 ORTHOTIC MGMT&TRAING 1ST ENC: CPT

## 2025-06-13 NOTE — PROGRESS NOTES
Patient: Adam Villasenor (70 year old, male) Referring Provider:  Insurance:   Diagnosis: Bilateral hand pain Lucia Walls  MEDICARE   Date of Surgery: NA Next MD visit:  [facility]   Precautions:  None TBD Referral Information:   Date of Injury: Chronic Date of Evaluation: Req: 0, Auth: 0, Exp:     25 POC Auth Visits:          Today's Date   2025    Subjective  \"I changed the way I pull my bow so there's less pressure on those fingers. There's still numbness, but it's not as much\"       Pain: 4/10     Objective  Discussed splint options for CMC arthritis    Hand Strength       2025   Hand Strength    Rt 37, 41, 39 av.7 39 42    Lt 56, 47, 54 av.3 49 56   3 Pt Pinch Rt 7 10    3 Pt Pinch Lt 10 12    Lateral Pinch Rt 11 13    Lateral Pinch Lt 14 14             Assessment  Discussed splint options for thumb as CMC joint pain remains limiting.  Provided information regarding ordering.  Pt continues to demonstrate limitations in ability to manipulate small objects, particularly multiple small objects in hand.    Goals (to be met in 8 visits)      Not Met Progress Toward Partially Met Met   Patient will report no more than 1-2/10 pain in R hand during self-care activities. [] [] [x] []   Patient will present with  strength in the R hand to that of 90% of the contralateral hand in order to be able to perform opening a jar. [] [x] [] []   Patient will present with increase in R digit 2-5 WEBB to 240 to allow for ease with gripping toothbrush. [] [] [x] []   Pt will demonstrate independence and compliance with HEP to maximize gains made in occupational therapy and progress toward functional independence.    [] [] [x] []   Patient will demonstrate an improvement in the self-rated DASH score to 20% or better to reflect functional gains reported, in the achievement of use of bow and self care daily skills.  [] [] [] []   Pt will easily manipulate small objects in hand  such as coins, keys or medications.  [] [x] [] []    [] [] [] []    [] [] [] []             Plan  Continue OT 1x/wk x 3-5 visits for continued strengthening, fine motor coordination tasks.    Treatment Last 4 Visits        5/8/2025 5/20/2025 5/29/2025 6/13/2025   OT Treatment   Treatment Day 2 3 4 5   Therapeutic Exercise yellow putty exercises  -/reposition  -roll/tip pinch  -lateral pinch  -tripod pinch/pull    Roll/pinch green/blue foam cubes    green clothespins to  and move foam cubes     Thumb drags to small finger to fingertip    Rubber band ex  Digit/thumb extension  Radial abduction  Flexion  Opposition  1DOI  Palmar abduction  Each x 20     In hand manipulation placing large pegs (x4), gripper black level 2 to remove    beige Flex bar  -wrist flexion  -wrist extension  -supination  -pronation  Each x 20    Digiflex green full  x 30  Red each digit x 15  Tripod pinch x 20  Green lateral pinch x 20    Opposition exercises with thumbercizer       Therapeutic Activity    Gross grasp and in hand manipulation with Mastermind, tweezers to remove    Grasp/release multiple small objects   Manual Therapy STM through thenar eminence, intrinsic mob    End range wrist flexion/extension  End range thumb motion Chip clip to web space    IASTM with hook to thenar eminence    Gentle PROM to thumb to end range flexion    Place/hold into opposition  PROM to end range DIPs/PIPs  Intrinsic stretch to all digits    CMC mobilization   OMT    Discussed splint options, trialed neoprene splint and Push splint, provided info on how to obtain splints PRN   Modalities US .8 w/cm2, 3 mhz, cont to thenar eminence, 8 min      Therapeutic Exercise Min 20 30 40    Ther Activity Min    15   Manual Therapy Min 12 15  20   Ultrasound Min 8      OMT Min    10   Total Timed Procedures 40 45 40 45   Total Service Procedures 40 45 40 45   Total Time 40 45 40 45         HEP   Discussed splint options    Charges     1 OMT, 1 TA, 1  MT

## 2025-06-18 ENCOUNTER — OFFICE VISIT (OUTPATIENT)
Facility: CLINIC | Age: 70
End: 2025-06-18
Payer: MEDICARE

## 2025-06-18 VITALS
BODY MASS INDEX: 27.85 KG/M2 | RESPIRATION RATE: 16 BRPM | HEART RATE: 70 BPM | SYSTOLIC BLOOD PRESSURE: 108 MMHG | HEIGHT: 69 IN | OXYGEN SATURATION: 96 % | DIASTOLIC BLOOD PRESSURE: 54 MMHG | WEIGHT: 188 LBS

## 2025-06-18 DIAGNOSIS — G47.33 OSA (OBSTRUCTIVE SLEEP APNEA): Primary | ICD-10-CM

## 2025-06-18 PROCEDURE — 99213 OFFICE O/P EST LOW 20 MIN: CPT | Performed by: PHYSICIAN ASSISTANT

## 2025-06-18 NOTE — PROGRESS NOTES
[facility] PULMONARY  SLEEP PROGRESS NOTE        DEBRA NASCIMENTO is a 70 year old male who presents today for new sleep consult      Chief Complaint   Patient presents with    New Patient     Pt here for sleep consult and agrees with AI scribe. Pt states current machine may be more than five years old.            The following individual(s) verbally consented to be recorded using ambient AI listening technology and understand that they can each withdraw their consent to this listening technology at any point by asking the clinician to turn off or pause the recording:    Patient name: Debra Nascimento  Additional names:  NA       History of Present Illness  Kyree Nascimento is a 70 year old male with sleep apnea who presents with issues related to CPAP use.    He has used a CPAP machine since 2021. He experiences insufficient force from the machine, especially at higher elevations, such as in [place]. During the initial transition when lying down, there is a 'three to five minute transition' where the machine struggles to sync with his breathing. Nasal congestion increases difficulty with CPAP use. Current pressure settings are between 7 and 11 cm H2O. He uses a nasal pillow mask that tilts during the night, affecting performance. He has replaced the headband once or twice but was unaware of the need to go through an agency for replacements. He sometimes wakes with a dry mouth, suggesting possible mouth breathing.       Received CPAP in August 2021    Sometimes feels he needs more air - occurs when he was in [place] and when he has nasal congestion     Can take a while to get comfortable with pressure when starting     [place] score: 10/24    In bed 2a  Out of bed 9-10a  SL 10-15 min  Nighttime awakenings rare  Naps can doze off in evenings after a glass of wine for a couple hours - 4x weekly  Caffeine 1 cup coffee in morning     Denies teeth grinding, leg cramps, restless legs, headaches, tinnitus, chest pain, thoracic  back pain, bloating, drowsy driving, sleep walking, sleep talking  Occ dry mouth     DME company: needs new DME   Mask type: nasal pillows     Usage 03/20/2025 - 06/17/2025  Usage days 76/90 days (84%)  >= 4 hours 70 days (78%)  < 4 hours 6 days (7%)  Usage hours 491 hours 14 minutes  Average usage (total days) 5 hours 27 minutes  Average usage (days used) 6 hours 28 minutes  Median usage (days used) 6 hours 34 minutes  AirSense 10 AutoSet  Serial number [id number]  Mode AutoSet  Min Pressure 7 cmH2O  Max Pressure 11 cmH2O  EPR Off  Response Standard  Therapy  Pressure - cmH2O Median: 8.2 95th percentile: 10.4 Maximum: 10.8  Leaks - L/min Median: 0.0 95th percentile: 11.9 Maximum: 26.0  Events per hour AI: 0.6 HI: 0.4 AHI: 1.0  Apnea Index Central: 0.3 Obstructive: 0.2 Unknown: 0.0  RERA Index 0.1  Cheyne-Rios respiration (average duration per night) 0 minutes (0%)       Patient: Sleep review of systems today: see form.      5/27/2021 Split Night  180#   Respiratory Analysis: During the baseline portion of the study, respiratory monitoring revealed an Apnea-Hypopnea Index (AHI) of 7.7, with an overall Respiratory Disturbance Index (RDI) of 14.6 events per hour. The REM AHI was 45.4. The supine AHI was 12.5 events per hour. The non-supine related AHI was 0 events per hour. The Central Apnea Index was 0. The oxygen doreen was 84.9% and the patient spent 0.2% of sleep time with oxygen levels below 88%. Supplemental oxygen was not used during the study.   During the CPAP portion of the study, respiratory monitoring revealed resolution of obstructive events with CPAP at 9 cm H2O. Supine REM was observed at this pressure.   RECOMMENDATIONS:   1. It is recommended that the patient should be prescribed Auto PAP at 9 cm H2O, with humidity at 3. and C-Flex off.   2. During the titration, the patient was fitted with a Resmed P10 mask, size small.         Pt  PCP:  Lucia Walls DO  No referring provider defined for this  encounter.           No data to display                  Past Medical History[1]  Past Surgical History[2]  Social History:  Social History     Social History Narrative    Not on file     Short Social Hx on File[3]  Family History:  Family History[4]  Allergies:  Allergies[5]  Current Meds:  Current Medications[6]   Counseling given: Not Answered  Tobacco comments: Updated 5/1/24         Problem List:  Problem List[7]    REVIEW OF SYSTEMS:   Review of Systems  See HPI     EXAM:   /54   Pulse 70   Resp 16   Ht 5' 9\" (1.753 m)   Wt 188 lb (85.3 kg)   SpO2 96%   BMI 27.76 kg/m²  Estimated body mass index is 27.76 kg/m² as calculated from the following:    Height as of this encounter: 5' 9\" (1.753 m).    Weight as of this encounter: 188 lb (85.3 kg).   Neck in inches:      Wt Readings from Last 6 Encounters:   06/18/25 188 lb (85.3 kg)   05/09/25 190 lb (86.2 kg)   04/11/25 148 lb (67.1 kg)   12/28/24 185 lb (83.9 kg)   11/22/24 185 lb (83.9 kg)   08/15/24 185 lb 12.8 oz (84.3 kg)     BP Readings from Last 3 Encounters:   06/18/25 108/54   04/11/25 130/72   12/28/24 131/68     Pulse Readings from Last 3 Encounters:   06/18/25 70   04/11/25 99   12/28/24 71     SpO2 Readings from Last 3 Encounters:   06/18/25 96%   04/11/25 96%   12/28/24 97%      Ideal body weight: 70.7 kg (155 lb 13.8 oz)  Adjusted ideal body weight: 76.5 kg (168 lb 11.5 oz)    Vital signs reviewed.  Physical Exam  Vitals and nursing note reviewed.   Constitutional:       Appearance: Normal appearance.   HENT:      Head: Normocephalic and atraumatic.      Right Ear: External ear normal.      Left Ear: External ear normal.   Pulmonary:      Effort: Pulmonary effort is normal. No respiratory distress.   Musculoskeletal:      Cervical back: Normal range of motion and neck supple.   Neurological:      General: No focal deficit present.      Mental Status: He is alert and oriented to person, place, and time.   Psychiatric:         Attention and  Perception: Attention and perception normal.         Mood and Affect: Mood and affect normal.         Speech: Speech normal.         Behavior: Behavior normal. Behavior is cooperative.         Thought Content: Thought content normal.         Cognition and Memory: Cognition and memory normal.         Judgment: Judgment normal.        Assessment & Plan  Obstructive Sleep Apnea  Reports issues with CPAP machine at higher elevations and nasal congestion. CPAP usage meets insurance requirements. Discussed mask options and equipment setup.  - Adjust CPAP pressure settings from 7-11 to 7-13cwp  - Remove ramp feature, start at 7 cm H2O.  - Set up with durable medical equipment company for regular supply shipments.  - Recommend changing nasal pillows biweekly.  - Advise to avoid evening dozing.  - Follow-up in 6 months to reassess therapy.    Patient is using and benefiting from regular cpap use.  Patient was instructed to clean equipment on a weekly basis.  Patient was instructed to keep up to date with supplies.  Patient was informed to avoid drowsy driving, or using heavy machinery.        Patient Instructions   CPAP/BiPAP Instructions:    Please be advised:   Do not drive while sleepy   Take CPAP/BiPAP machine to any procedure that requires sedation     When should I clean my machine & supplies?   Clean mask cushions or nasal pillow, headgear, tubing, and humidifier chamber with mild soap (Melyssa) and water   If water chamber has hard water buildup (white crust), soak in warm water & vinegar mix 50/50.   Rinse and hang dry     DAILY   Wipe mask cushions or nasal pillow   Empty & rinse water chamber- refill with distilled water     WEEKLY   Clean mask cushions or nasal pillow, headgear, tubing, and humidifier chamber with mild soap (Melyssa) and water   If water chamber has hard water buildup (white crust), soak in warm water & vinegar mix 50/50.   Rinse and hang dry     When should supplies be replaced?   Contact your home  care company for replacement supplies, or if your machine is malfunctioning   *Below is a general guideline of what we recommend. Replacement of supplies differs depending on your insurance company*   MONTHLY: Replace filter and mask cushion   6 MONTHS: Replace headgear and tubing     Travel Tips   Keep CPAP/BiPAP in original bag when traveling, and place luggage tag on bag   Most airlines consider CPAP/BiPAP to be a medical device, therefore it is a free carry-on item   If unable to get distilled water, bottled water is safe while traveling. DO NOT use tap water   When traveling outside the [place] only a power adapter is necessary (CPAP can operate without a converter), bring an extension cord   Consider purchasing or renting a travel CPAP (not covered by insurance)     Dry Mouth/Nose   Turn up the humidity on your machine (select \"Options\" from the home screen)   Place a cool mist humidifier at your bedside   Over-the-counter remedies: Biotene, XyliMelts, NasoGel     Air Leak   Try adjusting your mask/headgear while laying in sleeping position vs. sitting up   Wash and dry your face prior to putting your mask on   If applicable: shave facial hair at night (or before wearing CPAP)   Purchase \"RemZzzs\" (through home care co., [employer], or [employer])   100% cotton knit barrier that goes between your mask cushion and your skin   Replace your mask cushion (at least once per month) and/or headgear (every 3-6 months)     Nasal Congestion   CPAP therapy can cause nasal passages to dry out, & mucous membranes try to protect the nasal passages by producing excess mucous, so congestion results.   Over-the counter remedies: Flonase, Nasacort, Sinus Rinses (Neti-Pot), DO NOT USE Afrin   Try a mask that goes over the nose and mouth     Skin Irritation   Clean supplies regularly (Citrus II Mask Wipes, Control III disinfectant solution)   Try over the counter creams such as hydrocortisone 1% (apply in the morning after  showering)   Your headgear may be too tight, replace supplies so you don't need to adjust so tightly   Try RemZzzs (100% cotton knit barrier that goes between your mask cushion and your skin)     Gas/Bloating   Try a different sleeping position to keep air out of the stomach. Lay on the left side or rotate to the right side. Incline with pillows or lay flat.   Over-the-counter remedies: Simethicone            Independent interpretation of Sleep Download as defined above.  Continue with Rx management of Sleep apnea with PAP therapy.    COMPLIANCE is required by insurance for 4 hours a night most nights of the week.    Advised if still with sleep apnea and not using CPAP has a 7 fold increase in risk of heart attack, stroke, abnormal heart rhythm  and death,  increased risk of driving accidents.     Advised to refrain from driving when sleepy.      Recommend weight loss, and maintain and optimal BMI with Exercise 30 minutes most days to target heart rate .     Advised patient to change filters,masks,hoses  and tubes and equiptment on a  regular schedule.    Filters and seals shall be changed every 1 month,  Hoses every 3 months,   CPAP mask and humidifier chamber changed every 6 month  with the durable medical equipment provider.         Meds & Refills for this Visit:  Requested Prescriptions      No prescriptions requested or ordered in this encounter       Outcome: Parent verbalizes understanding. Parent is notified to call with any questions, complications, allergies, or worsening or changing symptoms.  Parent is to call with any side effects or complications from the treatments as a result of today.     \" This note was created utilizing Dragon speech recognition software.  Please excuse any grammatical errors. Call my office if you have any questions regarding this note. 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    Disorder of liver    NAFLD    Essential hypertension    Hearing impairment    Deaf in left ear    Hearing loss    High blood pressure    High cholesterol    Hyperlipidemia    Memory problem    Pain in joints    Sleep apnea    CPAP    Stented coronary artery    Visual impairment    Glasses    Wears glasses   [2]   Past Surgical History:  Procedure Laterality Date    Cath drug eluting stent      Colonoscopy  10/03/2016    normal, repeat in 3 yrs due to h/o polyps and family h/o colon cancer    Colonoscopy N/A 10/03/2016    Procedure: COLONOSCOPY;  Surgeon: Hakan Lui MD;  Location:  ENDOSCOPY    Other      Heart stent   [3]   Social History  Socioeconomic History    Marital status:    Tobacco Use    Smoking status: Never     Passive exposure: Never    Smokeless tobacco: Never    Tobacco comments:     Updated 5/1/24   Vaping Use    Vaping status: Never Used   Substance and Sexual Activity    Alcohol use: Yes     Alcohol/week: 2.0 standard drinks of alcohol     Types: 2 Cans of beer per week     Comment: 2 drinks per week    Drug use: Yes     Types: Cannabis     Comment: Approximately once per month   Other Topics Concern    Caffeine Concern Yes     Comment: 2-3 cups a day    Exercise Yes     Comment: 1 x a week     Social Drivers of Health     Food Insecurity: No Food Insecurity (4/11/2025)    NCSS - Food Insecurity     Worried About Running Out of Food in the Last Year: No     Ran Out of Food in the Last Year: No   Transportation Needs: No Transportation Needs (4/11/2025)    NCSS - Transportation     Lack of Transportation: No   Housing Stability: Not At Risk (4/11/2025)    NCSS - Housing/Utilities     Has Housing: Yes     Worried About Losing Housing: No     Unable to Get Utilities: No   [4]   Family History  Problem Relation Age of Onset    Colon Cancer Father     Cancer Father 82        colon     Psychiatric Son         depression    Stroke  Maternal Grandmother     Breast Cancer Paternal Grandmother     Other (Other) Paternal Grandmother         glaucoma    [5] No Known Allergies  [6]   Current Outpatient Medications   Medication Sig Dispense Refill    aspirin 81 MG Oral Tab EC Take 1 tablet (81 mg total) by mouth daily.      METOPROLOL SUCCINATE ER 25 MG Oral Tablet 24 Hr Take 1 tablet (25 mg total) by mouth daily. 90 tablet 0    rosuvastatin 10 MG Oral Tab Take 1 tablet (10 mg total) by mouth daily.     [7]   Patient Active Problem List  Diagnosis    History of placement of stent in anterior descending branch of left coronary artery    Presence of drug coated stent in LAD coronary artery    Atherosclerotic heart disease of native coronary artery without angina pectoris    Essential hypertension    FRANK[place] (obstructive sleep apnea)    Hip pain    Arthralgia of right wrist    Chronic fatigue    Ureterolithiasis    Personal history of adenomatous and serrated colon polyps    Family history of colon cancer

## 2025-06-18 NOTE — PROGRESS NOTES
Usage 03/20/2025 - 06/17/2025  Usage days 76/90 days (84%)  >= 4 hours 70 days (78%)  < 4 hours 6 days (7%)  Usage hours 491 hours 14 minutes  Average usage (total days) 5 hours 27 minutes  Average usage (days used) 6 hours 28 minutes  Median usage (days used) 6 hours 34 minutes  AirSense 10 AutoSet  Serial number [id number]  Mode AutoSet  Min Pressure 7 cmH2O  Max Pressure 11 cmH2O  EPR Off  Response Standard  Therapy  Pressure - cmH2O Median: 8.2 95th percentile: 10.4 Maximum: 10.8  Leaks - L/min Median: 0.0 95th percentile: 11.9 Maximum: 26.0  Events per hour AI: 0.6 HI: 0.4 AHI: 1.0  Apnea Index Central: 0.3 Obstructive: 0.2 Unknown: 0.0  RERA Index 0.1  Cheyne-Rios respiration (average duration per night) 0 minutes (0%)

## 2025-06-18 NOTE — PATIENT INSTRUCTIONS
CPAP/BiPAP Instructions:    Please be advised:   Do not drive while sleepy   Take CPAP/BiPAP machine to any procedure that requires sedation     When should I clean my machine & supplies?   Clean mask cushions or nasal pillow, headgear, tubing, and humidifier chamber with mild soap (Melyssa) and water   If water chamber has hard water buildup (white crust), soak in warm water & vinegar mix 50/50.   Rinse and hang dry     DAILY   Wipe mask cushions or nasal pillow   Empty & rinse water chamber- refill with distilled water     WEEKLY   Clean mask cushions or nasal pillow, headgear, tubing, and humidifier chamber with mild soap (Melyssa) and water   If water chamber has hard water buildup (white crust), soak in warm water & vinegar mix 50/50.   Rinse and hang dry     When should supplies be replaced?   Contact your home care company for replacement supplies, or if your machine is malfunctioning   *Below is a general guideline of what we recommend. Replacement of supplies differs depending on your insurance company*   MONTHLY: Replace filter and mask cushion   6 MONTHS: Replace headgear and tubing     Travel Tips   Keep CPAP/BiPAP in original bag when traveling, and place luggage tag on bag   Most airlines consider CPAP/BiPAP to be a medical device, therefore it is a free carry-on item   If unable to get distilled water, bottled water is safe while traveling. DO NOT use tap water   When traveling outside the [place] only a power adapter is necessary (CPAP can operate without a converter), bring an extension cord   Consider purchasing or renting a travel CPAP (not covered by insurance)     Dry Mouth/Nose   Turn up the humidity on your machine (select \"Options\" from the home screen)   Place a cool mist humidifier at your bedside   Over-the-counter remedies: Biotene, XyliMelts, NasoGel     Air Leak   Try adjusting your mask/headgear while laying in sleeping position vs. sitting up   Wash and dry your face prior to putting your  mask on   If applicable: shave facial hair at night (or before wearing CPAP)   Purchase \"RemZzzs\" (through home care co., [employer], or [email or web address])   100% cotton knit barrier that goes between your mask cushion and your skin   Replace your mask cushion (at least once per month) and/or headgear (every 3-6 months)     Nasal Congestion   CPAP therapy can cause nasal passages to dry out, & mucous membranes try to protect the nasal passages by producing excess mucous, so congestion results.   Over-the counter remedies: Flonase, Nasacort, Sinus Rinses (Neti-Pot), DO NOT USE Afrin   Try a mask that goes over the nose and mouth     Skin Irritation   Clean supplies regularly (Citrus II Mask Wipes, Control III disinfectant solution)   Try over the counter creams such as hydrocortisone 1% (apply in the morning after showering)   Your headgear may be too tight, replace supplies so you don't need to adjust so tightly   Try RemZzzs (100% cotton knit barrier that goes between your mask cushion and your skin)     Gas/Bloating   Try a different sleeping position to keep air out of the stomach. Lay on the left side or rotate to the right side. Incline with pillows or lay flat.   Over-the-counter remedies: Simethicone

## 2025-06-24 ENCOUNTER — OFFICE VISIT (OUTPATIENT)
Dept: OCCUPATIONAL MEDICINE | Age: 70
End: 2025-06-24
Attending: FAMILY MEDICINE
Payer: MEDICARE

## 2025-06-24 PROCEDURE — 97140 MANUAL THERAPY 1/> REGIONS: CPT

## 2025-06-24 PROCEDURE — 97110 THERAPEUTIC EXERCISES: CPT

## 2025-06-24 PROCEDURE — 97760 ORTHOTIC MGMT&TRAING 1ST ENC: CPT

## 2025-06-24 NOTE — PROGRESS NOTES
Patient: Adam Villasenor (70 year old, male) Referring Provider:  Insurance:   Diagnosis: Bilateral hand pain Lucia Walls  MEDICARE   Date of Surgery: NA Next MD visit:  [employer]   Precautions:  None TBD Referral Information:   Date of Injury: Chronic Date of Evaluation: Req: 0, Auth: 0, Exp:     05/06/25 POC Auth Visits:          Today's Date   6/24/2025    Subjective  \"The thumb still hurts. I bought this splint\"       Pain: 3/10     Objective  Pt brought in wrist and thumb spica brace.  While fit is appropriate, likely is too resistive/bulky splint for optimal pt needs. Pt fitted with custom thermoplastic hand based thumb spica to allow wrist and IP motion while protecting CMC joint. Discussed purpose and uses of both splints    Wrist       5/6/2025 5/29/2025 6/24/2025   Wrist ROM/MMT   Rt Wrist Flex (C7) 60 68 72   Lt Wrist Flex (C7) 76  76   Rt Wrist ext (C6) 30 45 45   Lt Wrist ext (C6) 45  45   Rt Wrist Ulnar Deviation 17     Lt Wrist Ulnar Deviation 20     Rt Wrist Radial Deviation 15     Lt Wrist Radial Deviation 15      Rt Hand       5/6/2025 5/29/2025 6/24/2025   R Hand ROM/MMT   MP Index Rt 80  85   PIP Index RT 85  85   DIP Index RT 55  65   WEBB Index   235   MP Middle RT 90     PIP Middle RT 85     DIP Middle RT 65     WEBB Middle   240   MP Ring RT 90 90 90   PIP Ring RT 80 80 80   DIP Ring RT 55 60 60   WEBB Ring  230 230   MP Small RT 85 90    PIP Small RT 80 80    DIP Small RT 60 70    WEBB Small  240 240    Hand Strength       5/29/2025 6/13/2025 6/24/2025   Hand Strength    Rt 39 42 42    Lt 49 56 56   3 Pt Pinch Rt 10  10   3 Pt Pinch Lt 12  12   Lateral Pinch Rt 13  11   Lateral Pinch Lt 14  14            Assessment  Pt reporting continued intermittent pain which can be dependent on activity.  Pt to continue splint use during activities as well as performance of HEP.  At this time, pt demo independence with HEP and will be d/c from formal therapy.  Pt to  follow with MD if pain worsens for possible consult with orthopaedics.    Goals (to be met in 8 visits)      Not Met Progress Toward Partially Met Met   Patient will report no more than 1-2/10 pain in R hand during self-care activities. [] [] [x] []   Patient will present with  strength in the R hand to that of 90% of the contralateral hand in order to be able to perform opening a jar. [] [x] [] []   Patient will present with increase in R digit 2-5 WEBB to 240 to allow for ease with gripping toothbrush. [] [] [x] []   Pt will demonstrate independence and compliance with HEP to maximize gains made in occupational therapy and progress toward functional independence.    [] [] [] [x]   Patient will demonstrate an improvement in the self-rated DASH score to 20% or better to reflect functional gains reported, in the achievement of use of bow and self care daily skills.  [] [x] [] []   Pt will easily manipulate small objects in hand such as coins, keys or medications.  [] [] [x] []    [] [] [] []    [] [] [] []                 Plan  Discharge pt to HEP only  Post QuickDASH Outcome Score  Post Score: (Patient-Rptd) 22.73 % (6/24/2025  9:00 AM)    29.54 % improvement          Patient/Family/Caregiver was advised of these findings, precautions, and treatment options and has agreed to actively participate in planning and for this course of care.    Thank you for your referral. If you have any questions, please contact me at Dept: [phone].    Sincerely,  Electronically signed by therapist: Almita Peralta, OT     Physician's certification required:  No  Please co-sign or sign and return this letter via fax as soon as possible to [phone].   I certify the need for these services furnished under this plan of treatment and while under my care.    X___________________________________________________ Date____________________    Certification From: 6/24/2025  To:9/22/2025    Treatment Last 4 Visits        5/20/2025  5/29/2025 6/13/2025 6/24/2025   OT Treatment   Treatment Day 3 4 5 6   Therapeutic Exercise Roll/pinch green/blue foam cubes    green clothespins to  and move foam cubes     Thumb drags to small finger to fingertip    Rubber band ex  Digit/thumb extension  Radial abduction  Flexion  Opposition  1DOI  Palmar abduction  Each x 20     In hand manipulation placing large pegs (x4), gripper black level 2 to remove    beige Flex bar  -wrist flexion  -wrist extension  -supination  -pronation  Each x 20    Digiflex green full  x 30  Red each digit x 15  Tripod pinch x 20  Green lateral pinch x 20    Opposition exercises with thumbercizer     yellow Flex bar  -wrist flexion  -wrist extension  -bar bend  -reverse bar bend  -supination  -pronation  Each x 20    Therapeutic Activity   Gross grasp and in hand manipulation with Mastermind, tweezers to remove    Grasp/release multiple small objects    Manual Therapy Chip clip to web space    IASTM with hook to thenar eminence    Gentle PROM to thumb to end range flexion    Place/hold into opposition  PROM to end range DIPs/PIPs  Intrinsic stretch to all digits    CMC mobilization Carpal mobilization    CMC crease/flatten    STM to thenar eminence   OMT   Discussed splint options, trialed neoprene splint and Push splint, provided info on how to obtain splints PRN Fabricated hand based thumb spica, educated pt in use and precautions   Therapeutic Exercise Min 30 40  15   Ther Activity Min   15    Manual Therapy Min 15  20 10   OMT Min   10 20   Total Timed Procedures 45 40 45 45   Total Service Procedures 45 40 45 45   Total Time 45 40 45 45         HEP  Review HEP    Use of splinting    Charges     1 MT, 1 TE, 1 OMT

## 2025-06-30 ENCOUNTER — OFFICE VISIT (OUTPATIENT)
Dept: NEUROLOGY | Facility: CLINIC | Age: 70
End: 2025-06-30
Payer: MEDICARE

## 2025-06-30 VITALS
SYSTOLIC BLOOD PRESSURE: 104 MMHG | RESPIRATION RATE: 16 BRPM | BODY MASS INDEX: 28 KG/M2 | WEIGHT: 188 LBS | HEART RATE: 63 BPM | DIASTOLIC BLOOD PRESSURE: 60 MMHG

## 2025-06-30 DIAGNOSIS — R41.3 MEMORY LOSS: Primary | ICD-10-CM

## 2025-06-30 DIAGNOSIS — R93.0 ABNORMAL MRI OF THE HEAD: ICD-10-CM

## 2025-06-30 PROCEDURE — 99204 OFFICE O/P NEW MOD 45 MIN: CPT | Performed by: OTHER

## 2025-06-30 NOTE — PROGRESS NOTES
The following individual(s) verbally consented to be recorded using ambient AI listening technology and understand that they can each withdraw their consent to this listening technology at any point by asking the clinician to turn off or pause the recording:    Patient name: Adam Villasenor  Additional names:  Brigid Villasenor

## 2025-06-30 NOTE — PATIENT INSTRUCTIONS
Refill policies:     Contact your pharmacy at least 5 days prior to running out of medication and have them send an electronic request or submit request through the “request refill” option in your [employer] account.  Allow 3-5 business days for refills; controlled substances may take longer.  If your prescription is due for a refill, please make sure you have a follow up appointment scheduled with the appropriate prescribing physician.  To best provide you care, patients receiving routine medications need to be seen at least once a year.  Patients receiving narcotic/controlled substance medications need to be seen at least once every 3 months.  Patients receiving narcotic/controlled substance medications will be required to sign an Opioid Treatment Agreement/Contract.  Refills will not be refilled on weekends or holidays; on-call physicians will not refill routine medications.  No narcotics or controlled substances are refilled after noon on Fridays or by on-call physicians.  Federal Law states narcotics must be electronically prescribed.  A 30-day supply with no refills is the maximum allowed by law.  In the event that your preferred pharmacy does not have the requested medication in stock (e.g., Backordered), it is your responsibility to find another pharmacy that has the requested medication available.  We will gladly send a new prescription to that pharmacy at your request.

## 2025-07-01 ENCOUNTER — OFFICE VISIT (OUTPATIENT)
Dept: FAMILY MEDICINE CLINIC | Facility: CLINIC | Age: 70
End: 2025-07-01
Payer: MEDICARE

## 2025-07-01 ENCOUNTER — TELEPHONE (OUTPATIENT)
Dept: FAMILY MEDICINE CLINIC | Facility: CLINIC | Age: 70
End: 2025-07-01

## 2025-07-01 VITALS
RESPIRATION RATE: 18 BRPM | WEIGHT: 187 LBS | SYSTOLIC BLOOD PRESSURE: 122 MMHG | HEART RATE: 75 BPM | TEMPERATURE: 97 F | OXYGEN SATURATION: 98 % | BODY MASS INDEX: 28 KG/M2 | DIASTOLIC BLOOD PRESSURE: 68 MMHG
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
## 2025-07-01 DIAGNOSIS — S30.860A TICK BITE OF LOWER BACK, INITIAL ENCOUNTER: Primary | ICD-10-CM

## 2025-07-01 DIAGNOSIS — L30.9 ECZEMA OF FOOT: ICD-10-CM

## 2025-07-01 PROCEDURE — 99214 OFFICE O/P EST MOD 30 MIN: CPT | Performed by: FAMILY MEDICINE

## 2025-07-01 RX ORDER — DOXYCYCLINE 100 MG/1
200 CAPSULE ORAL DAILY
Qty: 4 CAPSULE | Refills: 0 | Status: SHIPPED | OUTPATIENT
Start: 2025-07-01 | End: 2025-07-03

## 2025-07-01 RX ORDER — TRIAMCINOLONE ACETONIDE 1 MG/G
1 CREAM TOPICAL 2 TIMES DAILY PRN
Qty: 15 G | Refills: 0 | Status: SHIPPED | OUTPATIENT
Start: 2025-07-01

## 2025-07-01 NOTE — TELEPHONE ENCOUNTER
Patient was in the woods yesterday, found a tick on himself this morning.     Patient states the site is red and swollen, saved the tick but it is very small.     Patient states he will try to send photos of both the tick and the site of the bite. No openings today, please advise.

## 2025-07-01 NOTE — PROGRESS NOTES
Adam Villasenor is a 70 year old male.  Chief Complaint   Patient presents with    Tick     Removed on the 30     Ankle Pain       HPI:   Was in the woods on Sunday.   Found a tick on him Monday, yesterday. It was not engorged.   There's a rash in the spot where he found it.     He brought in tick: small, black, appears like adult ixodes.       ALLERGIES:  No Known Allergies      Current Outpatient Medications   Medication Sig Dispense Refill    doxycycline 100 MG Oral Cap Take 2 capsules (200 mg total) by mouth daily for 2 days. 4 capsule 0    triamcinolone 0.1 % External Cream Apply 1 Application topically 2 (two) times daily as needed. 15 g 0    METOPROLOL SUCCINATE ER 25 MG Oral Tablet 24 Hr Take 1 tablet (25 mg total) by mouth daily. 90 tablet 0    rosuvastatin 10 MG Oral Tab Take 1 tablet (10 mg total) by mouth daily.      aspirin 81 MG Oral Tab EC Take 1 tablet (81 mg total) by mouth daily. 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    Disorder of liver    NAFLD    Essential hypertension    Hearing impairment    Deaf in left ear    Hearing loss    High blood pressure    High cholesterol    Hyperlipidemia    Memory problem    Pain in joints    Sleep apnea    CPAP    Stented coronary artery    Visual impairment    Glasses    Wears glasses      Social History:  Social History     Socioeconomic History    Marital status:    Tobacco Use    Smoking status: Never     Passive exposure: Never    Smokeless tobacco: Never    Tobacco comments:     Updated 5/1/24   Vaping Use    Vaping status: Never Used   Substance and Sexual Activity    Alcohol use: Yes     Alcohol/week: 2.0 standard drinks of alcohol     Types: 2 Cans of beer per week     Comment: 2 drinks per week    Drug use: Yes     Types: Cannabis     Comment: Approximately once per month    Other Topics Concern    Caffeine Concern Yes     Comment: 2-3 cups a day    Exercise Yes     Comment: 1 x a week     Social Drivers of Health     Food Insecurity: No Food Insecurity (4/11/2025)    NCSS - Food Insecurity     Worried About Running Out of Food in the Last Year: No     Ran Out of Food in the Last Year: No   Transportation Needs: No Transportation Needs (4/11/2025)    NCSS - Transportation     Lack of Transportation: No   Housing Stability: Not At Risk (4/11/2025)    NCSS - Housing/Utilities     Has Housing: Yes     Worried About Losing Housing: No     Unable to Get Utilities: No        BP Readings from Last 6 Encounters:   07/01/25 122/68   06/30/25 104/60   06/18/25 108/54   04/11/25 130/72   12/28/24 131/68   11/22/24 157/67       Wt Readings from Last 6 Encounters:   07/01/25 187 lb (84.8 kg)   06/30/25 188 lb (85.3 kg)   06/18/25 188 lb (85.3 kg)   05/09/25 190 lb (86.2 kg)   04/11/25 148 lb (67.1 kg)   12/28/24 185 lb (83.9 kg)       REVIEW OF SYSTEMS:   GENERAL HEALTH: feels well no complaints  SKIN: denies any unusual skin lesions or rashes  RESPIRATORY: denies shortness of breath with exertion  CARDIOVASCULAR: denies chest pain on exertion  GI: denies abdominal pain and denies heartburn  NEURO: denies headaches    EXAM:   /68   Pulse 75   Temp 97.4 °F (36.3 °C) (Temporal)   Resp 18   Wt 187 lb (84.8 kg)   SpO2 98%   BMI 27.62 kg/m²  Body mass index is 27.62 kg/m².      GENERAL: well developed, well nourished,in no apparent distress  SKIN: one right lower back, waistline 5-6 cm area of erythema with central punctum around bite, + induration towards the center. + dry scaly patch on instep of left foot.   HEENT: atraumatic, normocephalic,   NECK: supple,no adenopathy   LUNGS: clear to auscultation  CARDIO: RRR without murmur  GI: good BS's,no masses, HSM or tenderness  EXTREMITIES: no cyanosis, clubbing or edema    ASSESSMENT AND PLAN:     Encounter Diagnoses   Name Primary?    Tick  bite of lower back, initial encounter Yes    Eczema of foot        Diagnoses and all orders for this visit:    Tick bite of lower back, initial encounter  -     doxycycline 100 MG Oral Cap; Take 2 capsules (200 mg total) by mouth daily for 2 days.    Eczema of foot  -     triamcinolone 0.1 % External Cream; Apply 1 Application topically 2 (two) times daily as needed.        No orders of the defined types were placed in this encounter.              Meds & Refills for this Visit:  Requested Prescriptions     Signed Prescriptions Disp Refills    doxycycline 100 MG Oral Cap 4 capsule 0     Sig: Take 2 capsules (200 mg total) by mouth daily for 2 days.    triamcinolone 0.1 % External Cream 15 g 0     Sig: Apply 1 Application topically 2 (two) times daily as needed.             The patient indicates understanding of these issues and agrees to the plan.

## 2025-07-01 NOTE — TELEPHONE ENCOUNTER
Patient's name and  verified   Future Appointments   Date Time Provider Department Center   2025 10:00 AM Lucia Walls DO [facility]   2025 12:00 PM Otis Babin MD [id number]   2025 10:00 AM Austen Ku MD [facility]       Patient notified and verbalized an understanding

## 2025-07-01 NOTE — PROGRESS NOTES
Neurology H&P    Adam Villasenor Patient Status:  No patient class for patient encounter    4/3/1955 MRN [id number]   Location [facility] Attending No att. providers found   Hosp Day # 0 PCP Lucia Walls,      Subjective:  Adam Villasenor is a(n) 70 year old male.  History of Present Illness  Kyree Villasenor is a 70 year old male who presents with memory loss. He is accompanied by his wife. He was referred by Dr. Marques for evaluation of memory loss.    He has been experiencing memory loss since 2016, with a noted increase in frequency and severity over the past couple of years. He has difficulty remembering names, such as his son's girlfriend who lives in the house, and often forgets things shortly after being told. He also experiences confusion while driving, leading to close calls at intersections. No hallucinations, delusions, or issues with personal care. He reports irritability and personality changes.    An MRI scan showed a small area in the left cortical region that may represent a tiny stroke or scar tissue. A follow-up MRI with contrast showed no enhancement. Previous neuropsychological testing was normal.    He has a history of sleep apnea for which he uses a CPAP machine. He is deaf in one ear and has a history of irritability. He takes a baby aspirin daily and has switched from atorvastatin to rosuvastatin due to hand pain. He no longer takes sertraline.    He manages his own personal care, including dressing, bathing, and cooking, without issues such as leaving the stove or water on. He holds a master's degree and has always managed the household finances. There is no family history of dementia.     Current Medications:  Current Medications[1]    Problem List:  Problem List[2]    PMHx:  Past Medical History[3]    PSHx:  Past Surgical History[4]    SocHx:  Short Social Hx on File[5]    Family History:  Family History[6]        ROS:  10 point ROS  completed and was negative, except for pertinent positive and negatives stated in subjective.    Objective/Physical Exam:    Vital Signs:  Blood pressure 104/60, pulse 63, resp. rate 16, weight 188 lb (85.3 kg).    Gen: Awake and in no apparent distress  HEENT: moist mucus membranes  Neck: Supple  Cardiovascular: Regular rate and rhythm, no murmur  Pulm: CTAB  GI: non-tender, normal bowel sounds  Skin: normal, dry  Extremities: No clubbing or cyanosis      Neurologic:   Physical Exam  MENTAL STATUS:   MOCA: 6/30/25  Visuospatial/executive 5/5  Naming 3/3  Attention 5/6  Language 3/3  Abstraction 2/2  Delayed Recall 3/5  Orientation 6/6  Total: 27/30      CRANIAL NERVES II to XII: PERRLA, no ptosis or diplopia, EOM intact, facial sensation intact, strong eye closure, face is symmetric, no dysarthria, tongue midline,  no tongue fasciculations or atrophy, strong shoulder shrug.    MOTOR EXAMINATION: normal tone, no fasciculations, normal strength throughout in UEs and LEs except.    SENSORY EXAMINATION:  UE: intact to light touch, pinprick intact  LE: intact to light touch, pinprick intact    COORDINATION:  No dysmetria, or intention tremors;     REFLEXES: 2+ at biceps, 2+ brachioradialis, 2+ at patella    GAIT: normal stance, normal gait           Labs:       Imaging:  MRI Brain 5/28/25  CONCLUSION:       1. No acute intracranial abnormality identified.      2. Slight interval decrease in conspicuity of previously visualized cortical/subcortical areas of T2/FLAIR hyperintensity along the left parietal lobe measuring up to 9 mm. There is stable trace T1 shortening associated with 1 of the lesions.  There is   no significant gradient susceptibility in these areas.  There is no significant enhancement in these areas.  These likely represent small areas of old cortical infarction, with other etiologies felt less likely but not entirely excluded.  Clinical   correlation recommended.      3. Stable minimal chronic  microvascular ischemic changes in the cerebral white matter.      4. Small developmental venous anomaly in the left cerebellar hemisphere.       Assessment & Plan  Memory loss  Memory loss ongoing for nearly ten years. MRI shows small left cortical region area, possibly stroke or scar tissue. Rafael Cognitive Assessment score 27/30, which is normal. Differential includes mild cognitive impairment, possibly related to sleep apnea, medications, or old stroke. No dementia evidence.   - Advise baby aspirin daily.  - Offer neuropsychological testing referral if desired.  He declined today  - Follow up in one year or sooner if memory worsens.    Stroke  MRI shows small left cortical region area, possibly stroke or scar tissue. No significant atrophy or enhancement.  - Continue statin.  - Continue baby aspirin daily.        Gucci Joseph, DO  Neurology         [1]   Current Outpatient Medications   Medication Sig Dispense Refill    METOPROLOL SUCCINATE ER 25 MG Oral Tablet 24 Hr Take 1 tablet (25 mg total) by mouth daily. 90 tablet 0    rosuvastatin 10 MG Oral Tab Take 1 tablet (10 mg total) by mouth daily.      doxycycline 100 MG Oral Cap Take 2 capsules (200 mg total) by mouth daily for 2 days. 4 capsule 0    triamcinolone 0.1 % External Cream Apply 1 Application topically 2 (two) times daily as needed. 15 g 0    aspirin 81 MG Oral Tab EC Take 1 tablet (81 mg total) by mouth daily. (Patient not taking: Reported on 6/30/2025)     [2]   Patient Active Problem List  Diagnosis    History of placement of stent in anterior descending branch of left coronary artery    Memory loss    Presence of drug coated stent in LAD coronary artery    Atherosclerotic heart disease of native coronary artery without angina pectoris    Essential hypertension    FRANKLIN (obstructive sleep apnea)    Hip pain    Arthralgia of right wrist    Chronic fatigue    Ureterolithiasis    Personal history of adenomatous and serrated colon polyps    Family  history of colon cancer    Abnormal MRI of the head   [3]   Past Medical History:   Arthritis    Atherosclerosis of coronary artery    stent placed     Coronary atherosclerosis    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    Disorder of liver    NAFLD    Essential hypertension    Hearing impairment    Deaf in left ear    Hearing loss    High blood pressure    High cholesterol    Hyperlipidemia    Memory problem    Pain in joints    Sleep apnea    CPAP    Stented coronary artery    Visual impairment    Glasses    Wears glasses   [4]   Past Surgical History:  Procedure Laterality Date    Cath drug eluting stent      Colonoscopy  10/03/2016    normal, repeat in 3 yrs due to h/o polyps and family h/o colon cancer    Colonoscopy N/A 10/03/2016    Procedure: COLONOSCOPY;  Surgeon: Hakan Lui MD;  Location:  ENDOSCOPY    Other      Heart stent   [5]   Social History  Socioeconomic History    Marital status:    Tobacco Use    Smoking status: Never     Passive exposure: Never    Smokeless tobacco: Never    Tobacco comments:     Updated 5/1/24   Vaping Use    Vaping status: Never Used   Substance and Sexual Activity    Alcohol use: Yes     Alcohol/week: 2.0 standard drinks of alcohol     Types: 2 Cans of beer per week     Comment: 2 drinks per week    Drug use: Yes     Types: Cannabis     Comment: Approximately once per month   Other Topics Concern    Caffeine Concern Yes     Comment: 2-3 cups a day    Exercise Yes     Comment: 1 x a week     Social Drivers of Health     Food Insecurity: No Food Insecurity (4/11/2025)    NCSS - Food Insecurity     Worried About Running Out of Food in the Last Year: No     Ran Out of Food in the Last Year: No   Transportation Needs: No Transportation Needs (4/11/2025)    NCSS - Transportation     Lack of Transportation: No   Housing Stability: Not At Risk (4/11/2025)    NCSS - Housing/Utilities     Has Housing: Yes     Worried About Losing Housing: No     Unable to Get Utilities: No    [6]   Family History  Problem Relation Age of Onset    Colon Cancer Father     Cancer Father 82        colon     Psychiatric Son         depression    Stroke Maternal Grandmother     Breast Cancer Paternal Grandmother     Other (Other) Paternal Grandmother         glaucoma

## 2025-07-25 ENCOUNTER — TELEPHONE (OUTPATIENT)
Dept: SURGERY | Facility: CLINIC | Age: 70
End: 2025-07-25

## 2025-07-25 ENCOUNTER — VIRTUAL PHONE E/M (OUTPATIENT)
Dept: SURGERY | Facility: CLINIC | Age: 70
End: 2025-07-25
Payer: MEDICARE

## 2025-07-25 DIAGNOSIS — N20.0 KIDNEY STONE: Primary | ICD-10-CM

## 2025-07-25 PROCEDURE — G2252 BRIEF CHKIN BY MD/QHP, 11-20: HCPCS | Performed by: UROLOGY

## 2025-07-25 NOTE — PROGRESS NOTES
Urology Clinic Note  Telemedicine Visit  Audio Only  The patient consented to performing this visit virtually.     Primary Care Provider:  Lucia Walls DO     Chief Complaint:   Kidney stones    HPI:   Adam Vilalsenor is a a(n)  70 year old male with hx of CAD s/p PCI, HTN, NAFLD, HL, FRANKLIN on CPAP, who presented to the ED for LLQ pain with associated nausea.  He was found to have a 8 mm left ureteral stone.  No other stones were noted.  He eventually underwent ureteroscopy on 11/22/24.  Stones were all cleared.  No further stones were noted.  Stent was later removed.  He has no previous history of kidney stones.  Stone analysis was calcium oxalate monohydrate, dihydrate.  Patient was initially lost to follow-up now presents for further discussion.  Ultrasound of kidneys was done on 5/21/2025-this showed a possible stone in the left kidney upper pole 4 mm in size.  No other abnormalities.  Patient is doing well today.  No urinary complaints.  No gross hematuria.  No flank pain.  No major changes to his health.    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    Disorder of liver    NAFLD    Essential hypertension    Hearing impairment    Deaf in left ear    Hearing loss    High blood pressure    High cholesterol    Hyperlipidemia    Memory problem    Pain in joints    Sleep apnea    CPAP    Stented coronary artery    Visual impairment    Glasses    Wears glasses       Past Surgical History:   Procedure Laterality Date    Cath drug eluting stent      Colonoscopy  10/03/2016    normal, repeat in 3 yrs due to h/o polyps and family h/o colon cancer    Colonoscopy N/A 10/03/2016    Procedure: COLONOSCOPY;  Surgeon: Hakan Lui  MD LUIZA;  Location:  ENDOSCOPY    Other      Heart stent       Family History   Problem Relation Age of Onset    Colon Cancer Father     Cancer Father 82        colon     Psychiatric Son         depression    Stroke Maternal Grandmother     Breast Cancer Paternal Grandmother     Other (Other) Paternal Grandmother         glaucoma        Social History     Socioeconomic History    Marital status:    Tobacco Use    Smoking status: Never     Passive exposure: Never    Smokeless tobacco: Never    Tobacco comments:     Updated 5/1/24   Vaping Use    Vaping status: Never Used   Substance and Sexual Activity    Alcohol use: Yes     Alcohol/week: 2.0 standard drinks of alcohol     Types: 2 Cans of beer per week     Comment: 2 drinks per week    Drug use: Yes     Types: Cannabis     Comment: Approximately once per month   Other Topics Concern    Caffeine Concern Yes     Comment: 2-3 cups a day    Exercise Yes     Comment: 1 x a week     Social Drivers of Health     Food Insecurity: No Food Insecurity (4/11/2025)    NCSS - Food Insecurity     Worried About Running Out of Food in the Last Year: No     Ran Out of Food in the Last Year: No   Transportation Needs: No Transportation Needs (4/11/2025)    NCSS - Transportation     Lack of Transportation: No   Housing Stability: Not At Risk (4/11/2025)    NCSS - Housing/Utilities     Has Housing: Yes     Worried About Losing Housing: No     Unable to Get Utilities: No       Medications (Active prior to today's visit):  Current Outpatient Medications   Medication Sig Dispense Refill    triamcinolone 0.1 % External Cream Apply 1 Application topically 2 (two) times daily as needed. 15 g 0    aspirin 81 MG Oral Tab EC Take 1 tablet (81 mg total) by mouth daily. (Patient not taking: Reported on 6/30/2025)      METOPROLOL SUCCINATE ER 25 MG Oral Tablet 24 Hr Take 1 tablet (25 mg total) by mouth daily. 90 tablet 0    rosuvastatin 10 MG Oral Tab Take 1 tablet (10 mg total) by mouth  daily.         Allergies:  No Known Allergies    Review of Systems:   A comprehensive 10-point review of systems was completed.  Pertinent positives and negatives are noted in the the HPI.    Physical Exam:   No physical exam performed during this telephone encounter.    Assessment & Plan:   Nephrolithiasis    Above history discussed in detail.  Patient status post ureteroscopy.  Follow-up imaging with possible stones, on my review this is equivocal.  We discussed obtaining further cross-sectional imaging now or in the future.  Given the patient is a first-time stone former and has no symptoms at this time and ureteroscopy was otherwise uncomplicated he would prefer with ultrasound surveillance.  I think this is reasonable.  We will plan for an ultrasound in about 1 year.  If any abnormalities are noted at this time we will obtain cross-sectional imaging.  We discussed healthy kidney stone diet.    In total, 20 minutes were spent on this patient encounter (including chart review, patient history, physical, and counseling, documentation, and communication).         Otis Babin MD  Staff Urologist  [facility]  Office: [phone]

## 2025-07-26 ENCOUNTER — NURSE ONLY (OUTPATIENT)
Dept: FAMILY MEDICINE CLINIC | Facility: CLINIC | Age: 70
End: 2025-07-26
Payer: MEDICARE

## 2025-07-26 DIAGNOSIS — Z12.5 SCREENING FOR MALIGNANT NEOPLASM OF PROSTATE: ICD-10-CM

## 2025-07-26 DIAGNOSIS — R41.3 MEMORY LOSS: ICD-10-CM

## 2025-07-26 LAB
COMPLEXED PSA SERPL-MCNC: 0.6 NG/ML (ref ?–4)
ERYTHROCYTE [SEDIMENTATION RATE] IN BLOOD: 9 MM/HR (ref 0–20)
TSI SER-ACNC: 3.35 UIU/ML (ref 0.55–4.78)
VIT B12 SERPL-MCNC: 519 PG/ML (ref 211–911)

## 2025-07-26 PROCEDURE — 84443 ASSAY THYROID STIM HORMONE: CPT | Performed by: OTHER

## 2025-07-26 PROCEDURE — 82607 VITAMIN B-12: CPT | Performed by: OTHER

## 2025-07-26 PROCEDURE — 85652 RBC SED RATE AUTOMATED: CPT | Performed by: OTHER

## 2025-07-30 ENCOUNTER — HOSPITAL ENCOUNTER (OUTPATIENT)
Age: 70
Discharge: HOME | End: 2025-07-30
Payer: MEDICARE

## 2025-07-30 VITALS
RESPIRATION RATE: 18 BRPM | SYSTOLIC BLOOD PRESSURE: 138 MMHG | HEART RATE: 71 BPM | DIASTOLIC BLOOD PRESSURE: 68 MMHG | TEMPERATURE: 98 F | OXYGEN SATURATION: 96 %

## 2025-07-30 DIAGNOSIS — L50.9 URTICARIA: Primary | ICD-10-CM

## 2025-07-30 PROCEDURE — 99213 OFFICE O/P EST LOW 20 MIN: CPT | Performed by: PHYSICIAN ASSISTANT

## 2025-07-30 RX ORDER — PREDNISONE 20 MG/1
40 TABLET ORAL DAILY
Qty: 10 TABLET | Refills: 0 | Status: SHIPPED | OUTPATIENT
Start: 2025-07-30 | End: 2025-08-04

## 2025-08-25 ENCOUNTER — APPOINTMENT (OUTPATIENT)
Dept: CARDIOLOGY | Age: 70
End: 2025-08-25

## 2025-08-28 ENCOUNTER — NURSE ONLY (OUTPATIENT)
Dept: FAMILY MEDICINE CLINIC | Facility: CLINIC | Age: 70
End: 2025-08-28

## 2025-08-28 DIAGNOSIS — I25.10 ATHEROSCLEROSIS OF NATIVE CORONARY ARTERY OF NATIVE HEART WITHOUT ANGINA PECTORIS: ICD-10-CM

## 2025-08-28 DIAGNOSIS — I10 ESSENTIAL HYPERTENSION, MALIGNANT: ICD-10-CM

## 2025-08-28 DIAGNOSIS — Z95.5 STENTED CORONARY ARTERY: ICD-10-CM

## 2025-08-28 DIAGNOSIS — R94.39 ABNORMAL STRESS TEST: Primary | ICD-10-CM

## 2025-08-28 DIAGNOSIS — E78.00 PURE HYPERCHOLESTEROLEMIA: ICD-10-CM

## 2025-08-28 DIAGNOSIS — Z86.69 HEALED PERFORATION OF EAR DRUM: ICD-10-CM

## 2025-08-28 DIAGNOSIS — R07.2 PRECORDIAL PAIN: ICD-10-CM

## 2025-08-28 LAB
ALBUMIN SERPL-MCNC: 4.6 G/DL (ref 3.2–4.8)
ALBUMIN/GLOB SERPL: 2 (ref 1–2)
ALP LIVER SERPL-CCNC: 85 U/L (ref 45–117)
ALT SERPL-CCNC: 40 U/L (ref 10–49)
ANION GAP SERPL CALC-SCNC: 12 MMOL/L (ref 0–18)
AST SERPL-CCNC: 28 U/L (ref ?–34)
BASOPHILS # BLD AUTO: 0.06 X10(3) UL (ref 0–0.2)
BASOPHILS NFR BLD AUTO: 1 %
BILIRUB SERPL-MCNC: 1.2 MG/DL (ref 0.2–1.1)
BUN BLD-MCNC: 12 MG/DL (ref 9–23)
CALCIUM BLD-MCNC: 9.6 MG/DL (ref 8.7–10.6)
CHLORIDE SERPL-SCNC: 104 MMOL/L (ref 98–112)
CHOLEST SERPL-MCNC: 128 MG/DL (ref ?–200)
CO2 SERPL-SCNC: 26 MMOL/L (ref 21–32)
CREAT BLD-MCNC: 1.09 MG/DL (ref 0.7–1.3)
EGFRCR SERPLBLD CKD-EPI 2021: 73 ML/MIN/1.73M2 (ref 60–?)
EOSINOPHIL # BLD AUTO: 0.18 X10(3) UL (ref 0–0.7)
EOSINOPHIL NFR BLD AUTO: 2.9 %
ERYTHROCYTE [DISTWIDTH] IN BLOOD BY AUTOMATED COUNT: 14.5 %
FASTING PATIENT LIPID ANSWER: YES
FASTING STATUS PATIENT QL REPORTED: YES
GLOBULIN PLAS-MCNC: 2.3 G/DL (ref 2–3.5)
GLUCOSE BLD-MCNC: 101 MG/DL (ref 70–99)
HCT VFR BLD AUTO: 43.6 % (ref 39–53)
HDLC SERPL-MCNC: 40 MG/DL (ref 40–59)
HGB BLD-MCNC: 14.7 G/DL (ref 13–17.5)
IMM GRANULOCYTES # BLD AUTO: 0.04 X10(3) UL (ref 0–1)
IMM GRANULOCYTES NFR BLD: 0.7 %
LDLC SERPL CALC-MCNC: 67 MG/DL (ref ?–100)
LYMPHOCYTES # BLD AUTO: 1.65 X10(3) UL (ref 1–4)
LYMPHOCYTES NFR BLD AUTO: 26.8 %
MCH RBC QN AUTO: 33.8 PG (ref 26–34)
MCHC RBC AUTO-ENTMCNC: 33.7 G/DL (ref 31–37)
MCV RBC AUTO: 100.2 FL (ref 80–100)
MONOCYTES # BLD AUTO: 0.57 X10(3) UL (ref 0.1–1)
MONOCYTES NFR BLD AUTO: 9.3 %
NEUTROPHILS # BLD AUTO: 3.65 X10 (3) UL (ref 1.5–7.7)
NEUTROPHILS # BLD AUTO: 3.65 X10(3) UL (ref 1.5–7.7)
NEUTROPHILS NFR BLD AUTO: 59.3 %
NONHDLC SERPL-MCNC: 88 MG/DL (ref ?–130)
OSMOLALITY SERPL CALC.SUM OF ELEC: 294 MOSM/KG (ref 275–295)
PLATELET # BLD AUTO: 332 10(3)UL (ref 150–450)
POTASSIUM SERPL-SCNC: 4.4 MMOL/L (ref 3.5–5.1)
PROT SERPL-MCNC: 6.9 G/DL (ref 5.7–8.2)
RBC # BLD AUTO: 4.35 X10(6)UL (ref 3.8–5.8)
SODIUM SERPL-SCNC: 142 MMOL/L (ref 136–145)
TRIGL SERPL-MCNC: 113 MG/DL (ref 30–149)
VLDLC SERPL CALC-MCNC: 17 MG/DL (ref 0–30)
WBC # BLD AUTO: 6.2 X10(3) UL (ref 4–11)

## 2025-08-28 PROCEDURE — 80061 LIPID PANEL: CPT | Performed by: INTERNAL MEDICINE

## 2025-08-28 PROCEDURE — 80053 COMPREHEN METABOLIC PANEL: CPT | Performed by: INTERNAL MEDICINE

## 2025-08-28 PROCEDURE — 85025 COMPLETE CBC W/AUTO DIFF WBC: CPT | Performed by: INTERNAL MEDICINE

## 2025-09-08 ENCOUNTER — APPOINTMENT (OUTPATIENT)
Dept: CARDIOLOGY | Age: 70
End: 2025-09-08

## (undated) DIAGNOSIS — Z11.59 SCREENING FOR VIRAL DISEASE: ICD-10-CM

## (undated) DIAGNOSIS — M25.559 HIP PAIN: ICD-10-CM

## (undated) DIAGNOSIS — M43.10 RETROLISTHESIS OF VERTEBRAE: ICD-10-CM

## (undated) DIAGNOSIS — R41.3 MEMORY PROBLEM: ICD-10-CM

## (undated) DIAGNOSIS — M54.41 CHRONIC RIGHT-SIDED LOW BACK PAIN WITH RIGHT-SIDED SCIATICA: ICD-10-CM

## (undated) DIAGNOSIS — M54.31 RIGHT SCIATIC NERVE PAIN: Primary | ICD-10-CM

## (undated) DIAGNOSIS — G89.29 CHRONIC RIGHT-SIDED LOW BACK PAIN WITH RIGHT-SIDED SCIATICA: ICD-10-CM

## (undated) DIAGNOSIS — Z01.818 PREOP EXAMINATION: Primary | ICD-10-CM

## (undated) DEVICE — SINGLE ACTION PUMPING SYSTEM

## (undated) DEVICE — 3M™ TEGADERM™ +PAD FILM DRESSING WITH NON-ADHERENT PAD, 3587, 3-1/2 IN X 4-1/8 IN (9 CM X 10.5 CM), 25/CAR, 4 CAR/CS: Brand: 3M™ TEGADERM™

## (undated) DEVICE — NITINOL STONE RETRIEVAL BASKET: Brand: ZERO TIP

## (undated) DEVICE — URETERAL ACCESS SHEATH SET: Brand: NAVIGATOR HD

## (undated) DEVICE — DISPOSABLE BIPOLAR FORCEPS 4" (10.2CM) JEWELERS, STRAIGHT 0.4MM TIP AND 12 FT. (3.6M) CABLE: Brand: KIRWAN

## (undated) DEVICE — SYRINGE MED 20ML STD CLR PLAS LL TIP N CTRL

## (undated) DEVICE — MINI-BLADE®: Brand: BEAVER®

## (undated) DEVICE — STERILE POLYISOPRENE POWDER-FREE SURGICAL GLOVES: Brand: PROTEXIS

## (undated) DEVICE — 20 ML SYRINGE LUER-LOCK TIP: Brand: MONOJECT

## (undated) DEVICE — FLEXIVA  PULSE  AND  FLEXIVA  PULSE  TRACTIP  LASER  FIBERS  ARE  HIGH  POWER  SINGLE-USE FIBER: Brand: FLEXIVA PULSE ID

## (undated) DEVICE — SINGLE-USE DIGITAL FLEXIBLE URETEROSCOPE: Brand: LITHOVUE

## (undated) DEVICE — SOLUTION RUBBING 4OZ 70% ISO ALC CLR

## (undated) DEVICE — SYRINGE MED 10ML LL TIP W/O SFTY DISP

## (undated) DEVICE — SUTURE MCRYL SZ 5-0 L18IN ABSRB UD L13MM P-3

## (undated) DEVICE — Device

## (undated) DEVICE — CATHETER URET 5FR L70CM FLX OPN TIP NONPORTED

## (undated) DEVICE — SLEEVE COMPR MD KNEE LEN SGL USE KENDALL SCD

## (undated) DEVICE — PACK PBDS CYSTOSCOPY

## (undated) DEVICE — ADHESIVE LIQ 2/3ML VI MASTISOL

## (undated) DEVICE — GOWN,SIRUS,FABRIC-REINFORCED,X-LARGE: Brand: MEDLINE

## (undated) DEVICE — SOLUTION IRRIG 3000ML 0.9% NACL FLX CONT

## (undated) DEVICE — NITINOL WIRE WITH HYDROPHILIC TIP: Brand: SENSOR

## (undated) DEVICE — GLOVE SUR 7.5 SENSICARE PI PIP CRM PWD F

## (undated) DEVICE — SOLUTION IRRIG 1000ML 0.9% NACL USP BTL

## (undated) DEVICE — ADHESIVE SKIN TOP FOR WND CLSR DERMBND ADV

## (undated) DEVICE — 3M™ STERI-STRIP™ REINFORCED ADHESIVE SKIN CLOSURES, R1547, 1/2 IN X 4 IN (12 MM X 100 MM), 6 STRIPS/ENVELOPE: Brand: 3M™ STERI-STRIP™

## (undated) DEVICE — DISPOSABLE TOURNIQUET CUFF SINGLE BLADDER, DUAL PORT AND QUICK CONNECT CONNECTOR: Brand: COLOR CUFF

## (undated) DEVICE — LOW PROFILE (LP) BLADE ASSEMBLY 6PK: Brand: MICROAIRE®

## (undated) DEVICE — SOLUTION ANTIFOG W/ ADH BK FOAM SPNG RADPQ

## (undated) DEVICE — STERILE POLYISOPRENE POWDER-FREE SURGICAL GLOVES WITH EMOLLIENT COATING: Brand: PROTEXIS

## (undated) DEVICE — UPPER EXTREMITY CDS-LF: Brand: MEDLINE INDUSTRIES, INC.

## (undated) DEVICE — SPONGE GZ 4XL4IN 100% COT 12 PLY TYP VII WVN

## (undated) NOTE — LETTER
[address]  [address]  Consent for Procedure/Sedation  Date: ***         Time: ***    {[facility] ivs consent:7230}

## (undated) NOTE — LETTER
OUTSIDE TESTING RESULT REQUEST     IMPORTANT: FOR YOUR IMMEDIATE ATTENTION  Please FAX all test results listed below to: [phone]     Testing already done on or about: 2023    * * * * If testing is NOT complete, arrange with patient A.S.A.P. * * * *      Patient Name: Jose Reyes  Surgery Date: 2023  Medical Record: [id number]  CSN: [id number]  : 4/3/1955 - A: 76 y     Sex: male  Surgeon(s):  Ymoaira Muse MD  Procedure: RIGHT ENDOSCOPIC CARPAL TUNNEL RELEASE, POSSIBLE OPEN  Anesthesia Type: MAC     Surgeon: Yomaira Muse MD     The following Testing and Time Line are REQUIRED PER ANESTHESIA     EKG READ AND SIGNED WITHIN   90 days  BMP (requires 4 hour fast) within  90 days      Thank You,   Sent by:ERICK Dahl

## (undated) NOTE — LETTER
Date: 5/7/2024    Patient Name: Adam Villasenor          To Whom it may concern:    The above patient was seen at [facility] for treatment of a medical condition.    This patient has a diagnosis of obstructive sleep apnea. It is medically necessary for him to have his CPAP machine with him whenever he travels.         Sincerely,    Lucia Walls, DO

## (undated) NOTE — Clinical Note
Bouchra Good saw Sharmila Ruiz in the office today. He has evidence of an umbilical hernia. He also has a diastases recti. I recommended repair of the umbilical hernia. The diastases does not need to be repaired.   Patient will call back when he is ready to sched

## (undated) NOTE — LETTER
[address]  [address]  Authorization for Surgical Operation and Procedure     Date:___________                                                                                                         Time:__________  I hereby authorize Surgeon(s):  Otis Babin MD, my physician and his/her assistants (if applicable), which may include medical students, residents, and/or fellows, to perform the following surgical operation/ procedure and administer such anesthesia as may be determined necessary by my physician:  Operation/Procedure name (s) Procedure(s):  CYSTOSCOPY, LEFT RETROGRADE, PYELOGRAM, LEFT URETEROSCOPY, LASER LITHOTRIPSY, INSERTION LEFT URETERAL STENT on Adam Villasenor   2.   I recognize that during the surgical operation/procedure, unforeseen conditions may necessitate additional or different procedures than those listed above.  I, therefore, further authorize and request that the above-named surgeon, assistants, or designees perform such procedures as are, in their judgment, necessary and desirable.    3.   My surgeon/physician has discussed prior to my surgery the potential benefits, risks and side effects of this procedure; the likelihood of achieving goals; and potential problems that might occur during recuperation.  They also discussed reasonable alternatives to the procedure, including risks, benefits, and side effects related to the alternatives and risks related to not receiving this procedure.  I have had all my questions answered and I acknowledge that no guarantee has been made as to the result that may be obtained.    4.   Should the need arise during my operation/procedure, which includes change of level of care prior to discharge, I also consent to the administration of blood and/or blood products.  Further, I understand that despite careful testing and screening of blood or blood products by collecting agencies, I may still be subject to ill  effects as a result of receiving a blood transfusion and/or blood products.  The following are some, but not all, of the potential risks that can occur: fever and allergic reactions, hemolytic reactions, transmission of diseases such as Hepatitis, AIDS and Cytomegalovirus (CMV) and fluid overload.  In the event that I wish to have an autologous transfusion of my own blood, or a directed donor transfusion, I will discuss this with my physician.  Check only if Refusing Blood or Blood Products  I understand refusal of blood or blood products as deemed necessary by my physician may have serious consequences to my condition to include possible death. I hereby assume responsibility for my refusal and release the hospital, its personnel, and my physicians from any responsibility for the consequences of my refusal.          o  Refuse      5.   I authorize the use of any specimen, organs, tissues, body parts or foreign objects that may be removed from my body during the operation/procedure for diagnosis, research or teaching purposes and their subsequent disposal by hospital authorities.  I also authorize the release of specimen test results and/or written reports to my treating physician on the hospital medical staff or other referring or consulting physicians involved in my care, at the discretion of the Pathologist or my treating physician.    6.   I consent to the photographing or videotaping of the operations or procedures to be performed, including appropriate portions of my body for medical, scientific, or educational purposes, provided my identity is not revealed by the pictures or by descriptive texts accompanying them.  If the procedure has been photographed/videotaped, the surgeon will obtain the original picture, image, videotape or CD.  The hospital will not be responsible for storage, release or maintenance of the picture, image, tape or CD.    7.   I consent to the presence of a  or observers  in the operating room as deemed necessary by my physician or their designees.    8.   I recognize that in the event my procedure results in extended X-Ray/fluoroscopy time, I may develop a skin reaction.    9. If I have a Do Not Attempt Resuscitation (DNAR) order in place, that status will be suspended while in the operating room, procedural suite, and during the recovery period unless otherwise explicitly stated by me (or a person authorized to consent on my behalf). The surgeon or my attending physician will determine when the applicable recovery period ends for purposes of reinstating the DNAR order.  10. Patients having a sterilization procedure: I understand that if the procedure is successful the results will be permanent and it will therefore be impossible for me to inseminate, conceive, or bear children.  I also understand that the procedure is intended to result in sterility, although the result has not been guaranteed.   11. I acknowledge that my physician has explained sedation/analgesia administration to me including the risk and benefits I consent to the administration of sedation/analgesia as may be necessary or desirable in the judgment of my physician.    I CERTIFY THAT I HAVE READ AND FULLY UNDERSTAND THE ABOVE CONSENT TO OPERATION and/or OTHER PROCEDURE.    _________________________________________  __________________________________  Signature of Patient     Signature of Responsible Person         ___________________________________         Printed Name of Responsible Person           _________________________________                 Relationship to Patient  _________________________________________  ______________________________  Signature of Witness          Date  Time      Patient Name: Adam Villasenor     : 4/3/1955                 Printed: 2024     Medical Record #: [id number]                     Page 1 of 2                                    [facility]  [address]  [place]  [address]    Consent for Anesthesia    I, Adam AC Villasenor agree to be cared for by an anesthesiologist, who is specially trained to monitor me and give me medicine to put me to sleep or keep me comfortable during my procedure    I understand that my anesthesiologist is not an employee or agent of [facility] or [employer]. He or she works for [employer].    As the patient asking for anesthesia services, I agree to:  Allow the anesthesiologist (anesthesia doctor) to give me medicine and do additional procedures as necessary. Some examples are: Starting or using an “IV” to give me medicine, fluids or blood during my procedure, and having a breathing tube placed to help me breathe when I’m asleep (intubation). In the event that my heart stops working properly, I understand that my anesthesiologist will make every effort to sustain my life, unless otherwise directed by [facility] Do Not Resuscitate documents.  Tell my anesthesia doctor before my procedure:  If I am pregnant.  The last time that I ate or drank.  All of the medicines I take (including prescriptions, herbal supplements, and pills I can buy without a prescription (including street drugs/illegal medications). Failure to inform my anesthesiologist about these medicines may increase my risk of anesthetic complications.  If I am allergic to anything or have had a reaction to anesthesia before.  I understand how the anesthesia medicine will help me (benefits).  I understand that with any type of anesthesia medicine there are risks:  The most common risks are: nausea, vomiting, sore throat, muscle soreness, damage to my eyes, mouth, or teeth (from breathing tube placement).  Rare risks include: remembering what happened during my procedure, allergic reactions to medications, injury to my airway, heart, lungs, vision, nerves, or muscles and in extremely rare instances death.  My doctor has  explained to me other choices available to me for my care (alternatives).  Pregnant Patients (“epidural”):  I understand that the risks of having an epidural (medicine given into my back to help control pain during labor), include itching, low blood pressure, difficulty urinating, headache or slowing of the baby’s heart. Very rare risks include infection, bleeding, seizure, irregular heart rhythms and nerve injury.  Regional Anesthesia (“spinal”, “epidural”, & “nerve blocks”):  I understand that rare but potential complications include headache, bleeding, infection, seizure, irregular heart rhythms, and nerve injury.    I can change my mind about having anesthesia services at any time before I get the medicine.    _____________________________________________________________________________  Patient (or Representative) Signature/Relationship to Patient  Date   Time    _____________________________________________________________________________   Name (if used)    Language/Organization   Time    _____________________________________________________________________________  Anesthesiologist Signature     Date   Time  I have discussed the procedure and information above with the patient (or patient’s representative) and answered their questions. The patient or their representative has agreed to have anesthesia services.    _____________________________________________________________________________  Witness        Date   Time  I have verified that the signature is that of the patient or patient’s representative, and that it was signed before the procedure  Patient Name: Adam Villasenor     : 4/3/1955                 Printed: 2024     Medical Record #: [id number]                     Page 2 of 2

## (undated) NOTE — LETTER
[address]  [address]  Authorization for Surgical Operation and Procedure     Date:___________                                                                                                         Time:__________  I hereby authorize Surgeon(s):  Otis Babin MD, my physician and his/her assistants (if applicable), which may include medical students, residents, and/or fellows, to perform the following surgical operation/ procedure and administer such anesthesia as may be determined necessary by my physician:  Operation/Procedure name (s) Procedure(s):  CYSTOSCOPY, LEFT RETROGRADE, PYELOGRAM, LEFT URETEROSCOPY, LASER LITHOTRIPSY, INSERTION LEFT URETERAL STENT on Adam Villasenor   2.   I recognize that during the surgical operation/procedure, unforeseen conditions may necessitate additional or different procedures than those listed above.  I, therefore, further authorize and request that the above-named surgeon, assistants, or designees perform such procedures as are, in their judgment, necessary and desirable.    3.   My surgeon/physician has discussed prior to my surgery the potential benefits, risks and side effects of this procedure; the likelihood of achieving goals; and potential problems that might occur during recuperation.  They also discussed reasonable alternatives to the procedure, including risks, benefits, and side effects related to the alternatives and risks related to not receiving this procedure.  I have had all my questions answered and I acknowledge that no guarantee has been made as to the result that may be obtained.    4.   Should the need arise during my operation/procedure, which includes change of level of care prior to discharge, I also consent to the administration of blood and/or blood products.  Further, I understand that despite careful testing and screening of blood or blood products by collecting agencies, I may still be subject to ill  effects as a result of receiving a blood transfusion and/or blood products.  The following are some, but not all, of the potential risks that can occur: fever and allergic reactions, hemolytic reactions, transmission of diseases such as Hepatitis, AIDS and Cytomegalovirus (CMV) and fluid overload.  In the event that I wish to have an autologous transfusion of my own blood, or a directed donor transfusion, I will discuss this with my physician.  Check only if Refusing Blood or Blood Products  I understand refusal of blood or blood products as deemed necessary by my physician may have serious consequences to my condition to include possible death. I hereby assume responsibility for my refusal and release the hospital, its personnel, and my physicians from any responsibility for the consequences of my refusal.          o  Refuse      5.   I authorize the use of any specimen, organs, tissues, body parts or foreign objects that may be removed from my body during the operation/procedure for diagnosis, research or teaching purposes and their subsequent disposal by hospital authorities.  I also authorize the release of specimen test results and/or written reports to my treating physician on the hospital medical staff or other referring or consulting physicians involved in my care, at the discretion of the Pathologist or my treating physician.    6.   I consent to the photographing or videotaping of the operations or procedures to be performed, including appropriate portions of my body for medical, scientific, or educational purposes, provided my identity is not revealed by the pictures or by descriptive texts accompanying them.  If the procedure has been photographed/videotaped, the surgeon will obtain the original picture, image, videotape or CD.  The hospital will not be responsible for storage, release or maintenance of the picture, image, tape or CD.    7.   I consent to the presence of a  or observers  in the operating room as deemed necessary by my physician or their designees.    8.   I recognize that in the event my procedure results in extended X-Ray/fluoroscopy time, I may develop a skin reaction.    9. If I have a Do Not Attempt Resuscitation (DNAR) order in place, that status will be suspended while in the operating room, procedural suite, and during the recovery period unless otherwise explicitly stated by me (or a person authorized to consent on my behalf). The surgeon or my attending physician will determine when the applicable recovery period ends for purposes of reinstating the DNAR order.  10. Patients having a sterilization procedure: I understand that if the procedure is successful the results will be permanent and it will therefore be impossible for me to inseminate, conceive, or bear children.  I also understand that the procedure is intended to result in sterility, although the result has not been guaranteed.   11. I acknowledge that my physician has explained sedation/analgesia administration to me including the risk and benefits I consent to the administration of sedation/analgesia as may be necessary or desirable in the judgment of my physician.    I CERTIFY THAT I HAVE READ AND FULLY UNDERSTAND THE ABOVE CONSENT TO OPERATION and/or OTHER PROCEDURE.    _________________________________________  __________________________________  Signature of Patient     Signature of Responsible Person         ___________________________________         Printed Name of Responsible Person           _________________________________                 Relationship to Patient  _________________________________________  ______________________________  Signature of Witness          Date  Time      Patient Name: Adam Villasenor     : 4/3/1955                 Printed: 2024     Medical Record #: [id number]                     Page 1 of 2                                    [facility]  [address]  [place]  [address]    Consent for Anesthesia    I, Adam AC Villasenor agree to be cared for by an anesthesiologist, who is specially trained to monitor me and give me medicine to put me to sleep or keep me comfortable during my procedure    I understand that my anesthesiologist is not an employee or agent of [facility] or [employer]. He or she works for [employer].    As the patient asking for anesthesia services, I agree to:  Allow the anesthesiologist (anesthesia doctor) to give me medicine and do additional procedures as necessary. Some examples are: Starting or using an “IV” to give me medicine, fluids or blood during my procedure, and having a breathing tube placed to help me breathe when I’m asleep (intubation). In the event that my heart stops working properly, I understand that my anesthesiologist will make every effort to sustain my life, unless otherwise directed by [facility] Do Not Resuscitate documents.  Tell my anesthesia doctor before my procedure:  If I am pregnant.  The last time that I ate or drank.  All of the medicines I take (including prescriptions, herbal supplements, and pills I can buy without a prescription (including street drugs/illegal medications). Failure to inform my anesthesiologist about these medicines may increase my risk of anesthetic complications.  If I am allergic to anything or have had a reaction to anesthesia before.  I understand how the anesthesia medicine will help me (benefits).  I understand that with any type of anesthesia medicine there are risks:  The most common risks are: nausea, vomiting, sore throat, muscle soreness, damage to my eyes, mouth, or teeth (from breathing tube placement).  Rare risks include: remembering what happened during my procedure, allergic reactions to medications, injury to my airway, heart, lungs, vision, nerves, or muscles and in extremely rare instances death.  My doctor has  explained to me other choices available to me for my care (alternatives).  Pregnant Patients (“epidural”):  I understand that the risks of having an epidural (medicine given into my back to help control pain during labor), include itching, low blood pressure, difficulty urinating, headache or slowing of the baby’s heart. Very rare risks include infection, bleeding, seizure, irregular heart rhythms and nerve injury.  Regional Anesthesia (“spinal”, “epidural”, & “nerve blocks”):  I understand that rare but potential complications include headache, bleeding, infection, seizure, irregular heart rhythms, and nerve injury.    I can change my mind about having anesthesia services at any time before I get the medicine.    _____________________________________________________________________________  Patient (or Representative) Signature/Relationship to Patient  Date   Time    _____________________________________________________________________________   Name (if used)    Language/Organization   Time    _____________________________________________________________________________  Anesthesiologist Signature     Date   Time  I have discussed the procedure and information above with the patient (or patient’s representative) and answered their questions. The patient or their representative has agreed to have anesthesia services.    _____________________________________________________________________________  Witness        Date   Time  I have verified that the signature is that of the patient or patient’s representative, and that it was signed before the procedure  Patient Name: Adam Villasenor     : 4/3/1955                 Printed: 2024     Medical Record #: [id number]                     Page 2 of 2

## (undated) NOTE — LETTER
10/26/2017          [address]  [place] [address]      Dear Cliff Lincoln,     We are contacting you from [facility] office. Your health is important to us.  We have not received test results for additional tests that your provider

## (undated) NOTE — LETTER
Patient Name: Khang Noyola  YOB: 1955          MRN number:  [id number]  Date:  7/23/2019  Referring Physician:  Anselmo Atkinson         INITIAL EVALUATION:    Referring Physician: Dr. Valentin Sarkar  Diagnosis:  Chronic right hip pain  Date of Ser -Right hip flexion is 100 degrees to onset of early resistance with mild symptom provocation right buttock   -Right hip ER is 30 degrees to onset of early resistance with mild symptom provocation right buttock  -Right hip IR is 5 degrees with overpressure factors relating to comorbidity, at least 3 elements related to body structure and function with in stable clinical presentation. Distal complaint likely of proximal origin, however, response to treatment and ongoing assessment will clarify.   No specific X___________________________________________________ Date____________________    Certification From: 5/03/1940  To:10/21/2019